# Patient Record
Sex: MALE | Race: WHITE | Employment: OTHER | RURAL
[De-identification: names, ages, dates, MRNs, and addresses within clinical notes are randomized per-mention and may not be internally consistent; named-entity substitution may affect disease eponyms.]

---

## 2018-01-31 PROBLEM — I48.0 PAROXYSMAL ATRIAL FIBRILLATION (HCC): Chronic | Status: ACTIVE | Noted: 2018-01-31

## 2018-01-31 PROBLEM — E03.9 ACQUIRED HYPOTHYROIDISM: Chronic | Status: ACTIVE | Noted: 2018-01-31

## 2018-01-31 PROBLEM — I50.22 SYSTOLIC CHF, CHRONIC (HCC): Chronic | Status: ACTIVE | Noted: 2018-01-31

## 2018-01-31 PROBLEM — I10 ESSENTIAL HYPERTENSION: Chronic | Status: ACTIVE | Noted: 2018-01-31

## 2018-01-31 PROBLEM — Z79.01 CHRONIC ANTICOAGULATION: Chronic | Status: ACTIVE | Noted: 2018-01-31

## 2018-01-31 PROBLEM — R47.01 BROCA'S APHASIA: Status: ACTIVE | Noted: 2018-01-31

## 2018-02-01 PROBLEM — G45.9 TIA (TRANSIENT ISCHEMIC ATTACK): Status: ACTIVE | Noted: 2018-02-01

## 2018-03-05 ENCOUNTER — OFFICE VISIT (OUTPATIENT)
Dept: NEUROLOGY | Age: 78
End: 2018-03-05

## 2018-03-05 ENCOUNTER — TELEPHONE (OUTPATIENT)
Dept: NEUROLOGY | Age: 78
End: 2018-03-05

## 2018-03-05 VITALS
HEIGHT: 76 IN | BODY MASS INDEX: 26.3 KG/M2 | DIASTOLIC BLOOD PRESSURE: 78 MMHG | SYSTOLIC BLOOD PRESSURE: 154 MMHG | WEIGHT: 216 LBS | OXYGEN SATURATION: 100 %

## 2018-03-05 DIAGNOSIS — I10 ESSENTIAL HYPERTENSION: ICD-10-CM

## 2018-03-05 DIAGNOSIS — I48.21 PERMANENT ATRIAL FIBRILLATION (HCC): ICD-10-CM

## 2018-03-05 DIAGNOSIS — G45.9 TRANSIENT CEREBRAL ISCHEMIA, UNSPECIFIED TYPE: Primary | ICD-10-CM

## 2018-03-05 NOTE — LETTER
3/5/2018 10:09 AM 
 
Patient:  Suresh Lyons YOB: 1940 Date of Visit: 3/5/2018 Dear MD Linda Sarabia. [facility] 
[place] [id number] VIA Facsimile: [phone] 
 : Thank you for referring Mr. Lani Ferrell to me for evaluation/treatment. Below are the relevant portions of my assessment and plan of care. HISTORY OF PRESENT ILLNESS Suresh Lyons is a 78 y.o. male. HPI Comments: Ángel Peñaloza is a 61-year-old right-handed  male who comes today for TIA. He showed up at the emergency room at [facility] within the last difficulty with speech. History of chronic atrial fibrillation and has gone in and out of atrial fibrillation in the past.  Currently is not in atrial fibrillation. He was on Xarelto and they added 81 mg of aspirin a day to Xarelto after this recent event. He had a TIA in 2005 as well which initiated the workup which found his accessible atrial fibrillation. Has a history of thyroid disease and takes replacement. He had migraine as a young man. He has had hypertension since 2005. TIA The history is provided by the patient. This is a new problem. Review of Systems Constitutional:  
     Review of systems is positive for tinnitus infrequently, complete review of systems done all others negative. Current Outpatient Prescriptions on File Prior to Visit Medication Sig Dispense Refill  aspirin 81 mg chewable tablet Take 1 Tab by mouth daily.  atorvastatin (LIPITOR) 10 mg tablet Take 1 Tab by mouth nightly. Indications: prevention of transient ischemic attack 30 Tab 0  
 levothyroxine (SYNTHROID) 75 mcg tablet Take 1 Tab by mouth Daily (before breakfast).  rivaroxaban (XARELTO) 20 mg tab tablet Take 1 Tab by mouth daily (with dinner). Indications: PREVENT THROMBOEMBOLISM IN CHRONIC ATRIAL FIBRILLATION    
 losartan (COZAAR) 100 mg tablet Take 100 mg by mouth daily.  metoprolol succinate (TOPROL-XL) 25 mg XL tablet Take 25 mg by mouth daily.  tamsulosin (FLOMAX) 0.4 mg capsule Take 0.4 mg by mouth daily.  timolol (TIMOPTIC) 0.5 % ophthalmic solution Administer 1 Drop to both eyes two (2) times a day. No current facility-administered medications on file prior to visit. Past Medical History:  
Diagnosis Date  [facility]  CAD (coronary artery disease)  Headache  Hypertension  Hypothyroidism  Stroke ([facility] History reviewed. No pertinent family history. Social History Substance Use Topics  Smoking status: Never Smoker  Smokeless tobacco: Never Used  Alcohol use No  
 
/78  Ht 6' 4\" (1.93 m)  Wt 216 lb (98 kg)  SpO2 100%  BMI 26.29 kg/m2 Physical Exam 
Constitutional: Oriented to person, place, and time, appears well-developed and well-nourished. No distress. HENT:  
Head: Normocephalic and atraumatic. Mouth/Throat: Oropharynx is clear and moist. No oropharyngeal exudate. Eyes: Conjunctivae and EOM are normal. Pupils are equal, round, and reactive to light. No scleral icterus. Neck: Normal range of motion. Neck supple. No thyromegaly present. Cardiovascular: Normal rate, regular rhythm and normal heart sounds. Musculoskeletal: Normal range of motion, exhibits no edema, tenderness or deformity. Lymphadenopathy: no cervical adenopathy. Neurological: Alert and oriented to person, place, and time. Normal strength and normal reflexes. Displays no atrophy and no tremor. No cranial nerve deficit or sensory deficit. Exhibits normal muscle tone. Displays a negative Romberg sign, no seizure activity. Coordination normal, gait normal.  
No Babinski's sign on the right side. No Babinski's sign on the left side.   
Speech, language and mentation are normal 
Visual fields are full to confrontation, funduscopic exam reveals flat discs, the retina and vasculature are normal  
 Skin: Skin is warm and dry. No rash noted, not diaphoretic. No erythema. Psychiatric: Normal mood and affect,  behavior is normal. Judgment and thought content normal.  
Vitals reviewed. ASSESSMENT and PLAN 
TIA Patient has had a TIA on Xarelto. He was put on aspirin 81 mg a day in addition to the Xarelto within the last 60 days. He has a history of accessible atrial fibrillation. He has had a transthoracic echocardiogram.  I recommend that he see Dr. Cal Golden or Dr. Radha Short, I am not sure how they work that in their group, for consideration of a transesophageal echocardiogram to make sure he does not have a PFO with shunt. Rich Willett He is 78years old however he looks 74. I will go with what cardiology recommends in this case. HYPERTENSION Stroke risk, stable, managed by primary care This note will not be viewable in [address] This note was created using voice recognition software. Despite editing, there may be syntax errors. If you have questions, please do not hesitate to call me. I look forward to following Mr. Vaughn Linares along with you. Sincerely, Radha Rubin MD

## 2018-03-05 NOTE — PROGRESS NOTES
HISTORY OF PRESENT ILLNESS  Toño Zee is a 78 y.o. male. HPI Comments: Beba Jefferson is a 51-year-old right-handed  male who comes today for TIA. He showed up at the emergency room at [address] [place] [address] within the last difficulty with speech. History of chronic atrial fibrillation and has gone in and out of atrial fibrillation in the past.  Currently is not in atrial fibrillation. He was on Xarelto and they added 81 mg of aspirin a day to Xarelto after this recent event. He had a TIA in 2005 as well which initiated the workup which found his accessible atrial fibrillation. Has a history of thyroid disease and takes replacement. He had migraine as a young man. He has had hypertension since 2005. TIA   The history is provided by the patient. This is a new problem. Review of Systems   Constitutional:        Review of systems is positive for tinnitus infrequently, complete review of systems done all others negative. Current Outpatient Prescriptions on File Prior to Visit   Medication Sig Dispense Refill    aspirin 81 mg chewable tablet Take 1 Tab by mouth daily.  atorvastatin (LIPITOR) 10 mg tablet Take 1 Tab by mouth nightly. Indications: prevention of transient ischemic attack 30 Tab 0    levothyroxine (SYNTHROID) 75 mcg tablet Take 1 Tab by mouth Daily (before breakfast).  rivaroxaban (XARELTO) 20 mg tab tablet Take 1 Tab by mouth daily (with dinner). Indications: PREVENT THROMBOEMBOLISM IN CHRONIC ATRIAL FIBRILLATION      losartan (COZAAR) 100 mg tablet Take 100 mg by mouth daily.  metoprolol succinate (TOPROL-XL) 25 mg XL tablet Take 25 mg by mouth daily.  tamsulosin (FLOMAX) 0.4 mg capsule Take 0.4 mg by mouth daily.  timolol (TIMOPTIC) 0.5 % ophthalmic solution Administer 1 Drop to both eyes two (2) times a day. No current facility-administered medications on file prior to visit.       Past Medical History:   Diagnosis Date    Atrial fibrillation [facility])     CAD (coronary artery disease)     Headache     Hypertension     Hypothyroidism     Stroke [facility])      History reviewed. No pertinent family history. Social History   Substance Use Topics    Smoking status: Never Smoker    Smokeless tobacco: Never Used    Alcohol use No     /78  Ht 6' 4\" (1.93 m)  Wt 216 lb (98 kg)  SpO2 100%  BMI 26.29 kg/m2    Physical Exam  Constitutional: Oriented to person, place, and time, appears well-developed and well-nourished. No distress. HENT:   Head: Normocephalic and atraumatic. Mouth/Throat: Oropharynx is clear and moist. No oropharyngeal exudate. Eyes: Conjunctivae and EOM are normal. Pupils are equal, round, and reactive to light. No scleral icterus. Neck: Normal range of motion. Neck supple. No thyromegaly present. Cardiovascular: Normal rate, regular rhythm and normal heart sounds. Musculoskeletal: Normal range of motion, exhibits no edema, tenderness or deformity. Lymphadenopathy: no cervical adenopathy. Neurological: Alert and oriented to person, place, and time. Normal strength and normal reflexes. Displays no atrophy and no tremor. No cranial nerve deficit or sensory deficit. Exhibits normal muscle tone. Displays a negative Romberg sign, no seizure activity. Coordination normal, gait normal.   No Babinski's sign on the right side. No Babinski's sign on the left side. Speech, language and mentation are normal  Visual fields are full to confrontation, funduscopic exam reveals flat discs, the retina and vasculature are normal   Skin: Skin is warm and dry. No rash noted, not diaphoretic. No erythema. Psychiatric: Normal mood and affect,  behavior is normal. Judgment and thought content normal.   Vitals reviewed. ASSESSMENT and PLAN  TIA  Patient has had a TIA on Xarelto. He was put on aspirin 81 mg a day in addition to the Xarelto within the last 60 days. He has a history of accessible atrial fibrillation.   He has had a transthoracic echocardiogram.  I recommend that he see Dr. Екатерина Mcdermott or Dr. Hollins Half, I am not sure how they work that in their group, for consideration of a transesophageal echocardiogram to make sure he does not have a PFO with shunt. Devi Coker He is 78years old however he looks 74. I will go with what cardiology recommends in this case. HYPERTENSION  Stroke risk, stable, managed by primary care  This note will not be viewable in [employer]. This note was created using voice recognition software. Despite editing, there may be syntax errors.

## 2018-03-05 NOTE — MR AVS SNAPSHOT
[address] [place] [id number] 
[phone] Patient: Magdy Drake MRN: [id number] Visit Information Date & Time Provider Department Dept. Phone Encounter #  
 3/5/2018  9:30 AM Merceda Oppenheim, MD [facility] Neurology [facility] [phone] [id number] Follow-up Instructions Return if symptoms worsen or fail to improve. Your Appointments 3/20/2018  2:15 PM  
ESTABLISHED PATIENT with Lisbeth Santiago MD  
[facility] Appt Note: Per Dr. Stephanie Palomo [facility] follow up had a stroke [address]  
[phone] [address]. Upcoming Health Maintenance Date Due DTaP/Tdap/Td series (1 - Tdap) 10/5/1963 ZOSTER VACCINE AGE 60> 8/5/2002 GLAUCOMA SCREENING Q2Y 10/5/2007 Pneumococcal 65+ Low/Medium Risk (1 of 2 - PCV13) 10/5/2007 MEDICARE YEARLY EXAM 10/5/2007 Influenza Age 5 to Adult 8/1/2017 Allergies as of 3/5/2018  Review Complete On: 3/5/2018 By: Merceda Oppenheim, MD  
  
 Severity Noted Reaction Type Reactions Chocolate Flavor  03/05/2018    Other (comments) Current Immunizations  Never Reviewed No immunizations on file. Not reviewed this visit You Were Diagnosed With   
  
 Codes Comments Transient cerebral ischemia, unspecified type    -  Primary ICD-10-CM: G45.9 ICD-9-CM: 435.9 Essential hypertension     ICD-10-CM: I10 
ICD-9-CM: 401.9 Permanent atrial fibrillation (HCC)     ICD-10-CM: Q79.3 ICD-9-CM: 427.31 Vitals BP Height(growth percentile) Weight(growth percentile) SpO2 BMI Smoking Status 154/78 6' 4\" (1.93 m) 216 lb (98 kg) 100% 26.29 kg/m2 Never Smoker Vitals History BMI and BSA Data Body Mass Index Body Surface Area  
 26.29 kg/m 2 2.29 m 2 Your Updated Medication List  
  
   
 This list is accurate as of 3/5/18 10:01 AM.  Always use your most recent med list.  
  
  
  
  
 aspirin 81 mg chewable tablet Take 1 Tab by mouth daily. atorvastatin 10 mg tablet Commonly known as:  LIPITOR Take 1 Tab by mouth nightly. Indications: prevention of transient ischemic attack FLOMAX 0.4 mg capsule Generic drug:  tamsulosin Take 0.4 mg by mouth daily. levothyroxine 75 mcg tablet Commonly known as:  SYNTHROID Take 1 Tab by mouth Daily (before breakfast). losartan 100 mg tablet Commonly known as:  COZAAR Take 100 mg by mouth daily. metoprolol succinate 25 mg XL tablet Commonly known as:  TOPROL-XL Take 25 mg by mouth daily. rivaroxaban 20 mg Tab tablet Commonly known as:  Amarilys Kanner Take 1 Tab by mouth daily (with dinner). Indications: PREVENT THROMBOEMBOLISM IN CHRONIC ATRIAL FIBRILLATION  
  
 timolol 0.5 % ophthalmic solution Commonly known as:  TIMOPTIC Administer 1 Drop to both eyes two (2) times a day. We Performed the Following REFERRAL TO CARDIOLOGY [BWD49 Custom] Comments: TIA on Xarelto Follow-up Instructions Return if symptoms worsen or fail to improve. Referral Information Referral ID Referred By Referred To  
  
 [id number] Ella Miller MD   
   [address] [address] Phone: [phone] Fax: [phone] Visits Status Start Date End Date 1 New Request 3/5/18 3/5/19 If your referral has a status of pending review or denied, additional information will be sent to support the outcome of this decision. Introducing [facility]! Eloina Brown introduces [employer] patient portal. Now you can access parts of your medical record, email your doctor's office, and request medication refills online. 1. In your internet browser, go to [email or web address] 2. Click on the First Time User? Click Here link in the Sign In box. You will see the New Member Sign Up page. 3. Enter your iM[employer] Access Code exactly as it appears below. You will not need to use this code after youve completed the sign-up process. If you do not sign up before the expiration date, you must request a new code. · iM[employer] Access Code: [email or web address] Expires: 5/1/2018  4:18 PM 
 
4. Enter the last four digits of your Social Security Number (xxxx) and Date of Birth (mm/dd/yyyy) as indicated and click Submit. You will be taken to the next sign-up page. 5. Create a iMOSPHERE ID. This will be your iMOSPHERE login ID and cannot be changed, so think of one that is secure and easy to remember. 6. Create a iM[employer] password. You can change your password at any time. 7. Enter your Password Reset Question and Answer. This can be used at a later time if you forget your password. 8. Enter your e-mail address. You will receive e-mail notification when new information is available in [address] 9. Click Sign Up. You can now view and download portions of your medical record. 10. Click the Download Summary menu link to download a portable copy of your medical information. If you have questions, please visit the Frequently Asked Questions section of the iM[employer]ERE website. Remember, iM[employer]ERE is NOT to be used for urgent needs. For medical emergencies, dial 911. Now available from your iPhone and Android! Please provide this summary of care documentation to your next provider. Your primary care clinician is listed as Phys Other. If you have any questions after today's visit, please call [phone].

## 2018-03-08 ENCOUNTER — DOCUMENTATION ONLY (OUTPATIENT)
Dept: NEUROLOGY | Age: 78
End: 2018-03-08

## 2018-03-20 ENCOUNTER — OFFICE VISIT (OUTPATIENT)
Dept: CARDIOLOGY CLINIC | Age: 78
End: 2018-03-20

## 2018-03-20 VITALS
DIASTOLIC BLOOD PRESSURE: 90 MMHG | HEIGHT: 75 IN | OXYGEN SATURATION: 99 % | SYSTOLIC BLOOD PRESSURE: 150 MMHG | BODY MASS INDEX: 27.27 KG/M2 | RESPIRATION RATE: 16 BRPM | HEART RATE: 60 BPM | WEIGHT: 219.3 LBS

## 2018-03-20 DIAGNOSIS — Z79.01 CHRONIC ANTICOAGULATION: Chronic | ICD-10-CM

## 2018-03-20 DIAGNOSIS — I48.0 PAROXYSMAL ATRIAL FIBRILLATION (HCC): Primary | Chronic | ICD-10-CM

## 2018-03-20 DIAGNOSIS — G45.9 TRANSIENT CEREBRAL ISCHEMIA, UNSPECIFIED TYPE: ICD-10-CM

## 2018-03-20 DIAGNOSIS — I10 ESSENTIAL HYPERTENSION: Chronic | ICD-10-CM

## 2018-03-20 RX ORDER — LATANOPROST 50 UG/ML
1 SOLUTION/ DROPS OPHTHALMIC
Status: ON HOLD | COMMUNITY
Start: 2018-02-22 | End: 2022-09-08

## 2018-03-20 NOTE — PROGRESS NOTES
Subjective:      Bharat Lambert is a 78 y.o. male is here for EP consult. Pt has a H/O TIAs in the past; he is on Xarelto and ASA. The patient denies chest pain/ shortness of breath, orthopnea, PND, LE edema,syncope, or fatigue. Mr Jodie Mims has long hx of TIAs post atrial fibrillation episodes begininning in 2005. He was last seen by EP in 2011. He is here after 2 recent TIAs post atrial fibrillation ON Xarelto. He admits to periodic dizziness not related to positional changes. Patient Active Problem List    Diagnosis Date Noted    TIA (transient ischemic attack) 02/01/2018    Broca's aphasia 01/31/2018    Paroxysmal atrial fibrillation ([facility] 01/31/2018    Chronic anticoagulation 55/41/1279    Systolic CHF, chronic ([address] 01/31/2018    Acquired hypothyroidism 01/31/2018    Essential hypertension 01/31/2018      Pat Mar MD  Past Medical History:   Diagnosis Date    Atrial fibrillation ([facility]     CAD (coronary artery disease)     Headache     Hypertension     Hypothyroidism     Stroke [facility])       Past Surgical History:   Procedure Laterality Date    HX APPENDECTOMY  1947    HX CATARACT REMOVAL  2009 x 2    HX THYROIDECTOMY  1950 Partial,    2003 Full     Allergies   Allergen Reactions    Chocolate Flavor Other (comments)      No family history on file. negative for cardiac disease  Social History     Social History    Marital status:      Spouse name: N/A    Number of children: N/A    Years of education: N/A     Social History Main Topics    Smoking status: Never Smoker    Smokeless tobacco: Never Used    Alcohol use No    Drug use: None    Sexual activity: Not Asked     Other Topics Concern    None     Social History Narrative     Current Outpatient Prescriptions   Medication Sig    VIT C/E/ZN/COPPR/LUTEIN/ZEAXAN (PRESERVISION AREDS 2 PO) Take  by mouth two (2) times a day.     latanoprost (XALATAN) 0.005 % ophthalmic solution Administer 1 Drop to both eyes nightly.  aspirin 81 mg chewable tablet Take 1 Tab by mouth daily.  levothyroxine (SYNTHROID) 75 mcg tablet Take 1 Tab by mouth Daily (before breakfast).  rivaroxaban (XARELTO) 20 mg tab tablet Take 1 Tab by mouth daily (with dinner). Indications: PREVENT THROMBOEMBOLISM IN CHRONIC ATRIAL FIBRILLATION    losartan (COZAAR) 100 mg tablet Take 100 mg by mouth daily.  metoprolol succinate (TOPROL-XL) 25 mg XL tablet Take 25 mg by mouth daily.  tamsulosin (FLOMAX) 0.4 mg capsule Take 0.8 mg by mouth daily.  timolol (TIMOPTIC) 0.5 % ophthalmic solution Administer 1 Drop to both eyes daily. No current facility-administered medications for this visit. Vitals:    03/20/18 1437 03/20/18 1438   BP: (!) 140/100 150/90   Pulse: 60    Resp: 16    SpO2: 99%    Weight: 219 lb 4.8 oz (99.5 kg)    Height: 6' 3\" (1.905 m)        I have reviewed the nurses notes, vitals, problem list, allergy list, medical history, family, social history and medications. Review of Symptoms:    General: Pt denies excessive weight gain or loss. Pt is able to conduct ADL's  HEENT: Denies blurred vision, headaches, epistaxis and difficulty swallowing. Respiratory: Denies shortness of breath, DOWNS, wheezing or stridor. Cardiovascular: Denies precordial pain, palpitations, edema or PND  Gastrointestinal: Denies poor appetite, indigestion, abdominal pain or blood in stool  Urinary: Denies dysuria, pyuria  Musculoskeletal: Denies pain or swelling from muscles or joints  Neurologic: Denies tremor, paresthesias, or sensory motor disturbance  Skin: Denies rash, itching or texture change. Psych: Denies depression      Physical Exam:      General: Well developed, in no acute distress. HEENT: Eyes - PERRL, no jvd  Heart:  Normal S1/S2 negative S3 or S4. Regular, no murmur, gallop or rub.   Respiratory: Clear bilaterally x 4, no wheezing or rales  Extremities:  No edema, normal cap refill, no cyanosis.   Musculoskeletal: No clubbing  Neuro: A&Ox3, speech clear, gait stable. Skin: Skin color is normal. No rashes or lesions. Non diaphoretic  Vascular: 2+ pulses symmetric in all extremities    Cardiographics    Ekg: Sinus  Rhythm   WITHIN NORMAL LIMITS  Ventricular rate 61. Results for orders placed or performed during the hospital encounter of 03/16/18   EKG, 12 LEAD, INITIAL   Result Value Ref Range    Ventricular Rate 74 BPM    Atrial Rate 74 BPM    P-R Interval 202 ms    QRS Duration 100 ms    Q-T Interval 382 ms    QTC Calculation (Bezet) 424 ms    Calculated P Axis 43 degrees    Calculated R Axis 19 degrees    Calculated T Axis 59 degrees    Diagnosis       Normal sinus rhythm  Normal ECG  When compared with ECG of 31-JAN-2018 11:53,  No significant change was found  Confirmed by Gerhard Menjivar MD, --- ([id number]) on 3/17/2018 7:02:13 AM           Lab Results   Component Value Date/Time    WBC 8.9 03/16/2018 02:35 PM    HGB 13.7 03/16/2018 02:35 PM    HCT 40.3 03/16/2018 02:35 PM    PLATELET 281 91/60/1845 02:35 PM    MCV 83.6 03/16/2018 02:35 PM      Lab Results   Component Value Date/Time    Sodium 137 03/16/2018 02:35 PM    Potassium 3.8 03/16/2018 02:35 PM    Chloride 99 03/16/2018 02:35 PM    CO2 31 03/16/2018 02:35 PM    Anion gap 7 03/16/2018 02:35 PM    Glucose 124 (H) 03/16/2018 02:35 PM    BUN 16 03/16/2018 02:35 PM    Creatinine 0.89 03/16/2018 02:35 PM    BUN/Creatinine ratio 18 03/16/2018 02:35 PM    GFR est AA >60 03/16/2018 02:35 PM    GFR est non-AA >60 03/16/2018 02:35 PM    Calcium 8.4 (L) 03/16/2018 02:35 PM    Bilirubin, total 0.7 03/16/2018 02:35 PM    AST (SGOT) 13 (L) 03/16/2018 02:35 PM    Alk.  phosphatase 92 03/16/2018 02:35 PM    Protein, total 6.7 03/16/2018 02:35 PM    Albumin 3.5 03/16/2018 02:35 PM    Globulin 3.2 03/16/2018 02:35 PM    A-G Ratio 1.1 03/16/2018 02:35 PM    ALT (SGPT) 20 03/16/2018 02:35 PM      Lab Results   Component Value Date/Time    TSH 0.55 02/01/2018 05:53 AM Assessment:            ICD-10-CM ICD-9-CM    1. Paroxysmal atrial fibrillation (HCC) I48.0 427.31    2. Essential hypertension I10 401.9 VIT C/E/ZN/COPPR/LUTEIN/ZEAXAN (PRESERVISION AREDS 2 PO)      latanoprost (XALATAN) 0.005 % ophthalmic solution      AMB POC EKG ROUTINE W/ 12 LEADS, INTER & REP   3. Transient cerebral ischemia, unspecified type G45.9 435.9    4. Chronic anticoagulation Z79.01 V58.61      Orders Placed This Encounter    AMB POC EKG ROUTINE W/ 12 LEADS, INTER & REP     Order Specific Question:   Reason for Exam:     Answer:   routine    VIT C/E/ZN/COPPR/LUTEIN/ZEAXAN (PRESERVISION AREDS 2 PO)     Sig: Take  by mouth two (2) times a day.  latanoprost (XALATAN) 0.005 % ophthalmic solution     Sig: Administer 1 Drop to both eyes nightly. Plan:     Mr Simba Bush is a pleasant 78year old male with AF and TIAs. Pt is a candidate for a MAXINE, afib ablation, and ILR. I discussed the risks/benefits/alternatives of the procedure with the patient. Risks include (but are not limited to) bleeding, heart block, infection, cva/mi/tamponade/esophageal perforation/pv stenosis/death. The patient understands that there is a 7-5% major complication rate and agrees to proceed. Thank you for this interesting consultation. Continue medical management for TIAs, AF and htn. Thank you for allowing me to participate in Radha Aletha 's care. Johnathan Dandy, NP    Thank you for allowing me to participate in this patients care.     Libby Lozoya MD, Chana Meléndez

## 2018-03-20 NOTE — PROGRESS NOTES
1. Have you been to the ER, urgent care clinic since your last visit? Hospitalized since your last visit? Yes When: 3/16/18 TIA    2. Have you seen or consulted any other health care providers outside of the [address] [place] [address] since your last visit? Include any pap smears or colon screening. Yes When: Neurologist 3/2018     Patient C/O BP fluctuations has been requested to see Dr Liz Duran for scheduling a MAXINE.

## 2018-03-20 NOTE — MR AVS SNAPSHOT
[address] [facility] 
[phone] Patient: Betzaida Sanders MRN: [id number] DVX:[id number]/[id number] Visit Information Date & Time Provider Department Dept. Phone Encounter #  
 3/20/2018  2:15 PM Eliseo Tuttle, [facility] [phone] [id number] Follow-up Instructions Return in about 4 weeks (around 4/17/2018). Follow-up and Disposition History Your Appointments 4/24/2018  2:15 PM  
PROCEDURE with PACEMAKER, [facility] [address] [facility]) Appt Note: hosp f/up w/ Dr. Siomara Payan, pm check, Dr. Selma Daugherty refd to Dr. Marifer Gramajo, pt over 2 hours away needed this date/time w/ Dr. Pam Bardales [address] [facility]  
[phone] [address] [facility]  
  
    
 4/24/2018  2:30 PM  
ESTABLISHED PATIENT with Eliseo Tuttle MD  
[facility] [address] [place] [address]) Appt Note: hosp f/up w/ Dr. Siomara Payan, pm check, Dr. Selma Daugherty refd to Dr. Marifer Gramajo, pt over 2 hours away needed this date/time w/ Dr. Pam Bardales [address] [facility]  
[phone] [address] [facility]  
  
    
 4/24/2018  2:45 PM  
New Patient with Ellie Query, [address] [facility] [address] [place] [address]) Appt Note: hosp f/up w/ Dr. Siomara Payan, pm check, Dr. Selma Daugherty refd to Dr. Marifer Gramajo, pt over 2 hours away needed this date/time w/ Dr. Pam Bardales [address] [facility]  
[phone] [address] [facility] Upcoming Health Maintenance Date Due DTaP/Tdap/Td series (1 - Tdap) 10/5/1963 ZOSTER VACCINE AGE 60> 8/5/2002 GLAUCOMA SCREENING Q2Y 10/5/2007 Pneumococcal 65+ Low/Medium Risk (1 of 2 - PCV13) 10/5/2007 Influenza Age 5 to Adult 8/1/2017 MEDICARE YEARLY EXAM 3/14/2018 Allergies as of 3/20/2018  Review Complete On: 3/20/2018 By: Kiel Maldonado MD  
  
 Severity Noted Reaction Type Reactions Chocolate Flavor  03/05/2018    Other (comments) Current Immunizations  Never Reviewed No immunizations on file. Not reviewed this visit You Were Diagnosed With   
  
 Codes Comments Paroxysmal atrial fibrillation (HCC)    -  Primary ICD-10-CM: I48.0 ICD-9-CM: 427.31 Essential hypertension     ICD-10-CM: I10 
ICD-9-CM: 401.9 Transient cerebral ischemia, unspecified type     ICD-10-CM: G45.9 ICD-9-CM: 435.9 Chronic anticoagulation     ICD-10-CM: Z79.01 
ICD-9-CM: V58.61 Vitals BP Pulse Resp Height(growth percentile) Weight(growth percentile) SpO2  
 150/90 (BP 1 Location: Right arm, BP Patient Position: Sitting) 60 16 6' 3\" (1.905 m) 219 lb 4.8 oz (99.5 kg) 99% BMI Smoking Status 27.41 kg/m2 Never Smoker Vitals History BMI and BSA Data Body Mass Index Body Surface Area  
 27.41 kg/m 2 2.29 m 2 Your Updated Medication List  
  
   
This list is accurate as of 3/20/18  3:45 PM.  Always use your most recent med list.  
  
  
  
  
 aspirin 81 mg chewable tablet Take 1 Tab by mouth daily. FLOMAX 0.4 mg capsule Generic drug:  tamsulosin Take 0.8 mg by mouth daily. latanoprost 0.005 % ophthalmic solution Commonly known as:  Jacqualyn Alma Administer 1 Drop to both eyes nightly. levothyroxine 75 mcg tablet Commonly known as:  SYNTHROID Take 1 Tab by mouth Daily (before breakfast). losartan 100 mg tablet Commonly known as:  COZAAR Take 100 mg by mouth daily. metoprolol succinate 25 mg XL tablet Commonly known as:  TOPROL-XL Take 25 mg by mouth daily. PRESERVISION AREDS 2 PO Take  by mouth two (2) times a day. rivaroxaban 20 mg Tab tablet Commonly known as:  Francenia Asa Take 1 Tab by mouth daily (with dinner).  Indications: PREVENT THROMBOEMBOLISM IN CHRONIC ATRIAL FIBRILLATION  
  
 timolol 0.5 % ophthalmic solution Commonly known as:  TIMOPTIC Administer 1 Drop to both eyes daily. We Performed the Following AMB POC EKG ROUTINE W/ 12 LEADS, INTER & REP [23079 CPT(R)] CBC W/O DIFF [15573 CPT(R)] MAGNESIUM [id number] CPT(R)] METABOLIC PANEL, COMPREHENSIVE [98465 CPT(R)] PROTHROMBIN TIME + INR [16105 CPT(R)] Follow-up Instructions Return in about 4 weeks (around 4/17/2018). Introducing [facility] & HEALTH SERVICES! [employer] introduces Lean Startup Machine patient portal. Now you can access parts of your medical record, email your doctor's office, and request medication refills online. 1. In your internet browser, go to [email or web address] 2. Click on the First Time User? Click Here link in the Sign In box. You will see the New Member Sign Up page. 3. Enter your Lean Startup Machine Access Code exactly as it appears below. You will not need to use this code after youve completed the sign-up process. If you do not sign up before the expiration date, you must request a new code. · Lean Startup Machine Access Code: [email or web address] Expires: 5/1/2018  5:18 PM 
 
4. Enter the last four digits of your Social Security Number (xxxx) and Date of Birth (mm/dd/yyyy) as indicated and click Submit. You will be taken to the next sign-up page. 5. Create a Lean Startup Machine ID. This will be your Lean Startup Machine login ID and cannot be changed, so think of one that is secure and easy to remember. 6. Create a Lean Startup Machine password. You can change your password at any time. 7. Enter your Password Reset Question and Answer. This can be used at a later time if you forget your password. 8. Enter your e-mail address. You will receive e-mail notification when new information is available in [address] 9. Click Sign Up. You can now view and download portions of your medical record.  
10. Click the Download Summary menu link to download a portable copy of your medical information. If you have questions, please visit the Frequently Asked Questions section of the Technimark website. Remember, Technimark is NOT to be used for urgent needs. For medical emergencies, dial 911. Now available from your iPhone and Android! Please provide this summary of care documentation to your next provider. Your primary care clinician is listed as Phys Other. If you have any questions after today's visit, please call [phone].

## 2018-04-09 ENCOUNTER — HOSPITAL ENCOUNTER (INPATIENT)
Dept: NON INVASIVE DIAGNOSTICS | Age: 78
LOS: 3 days | Discharge: HOME | DRG: 274 | End: 2018-04-12
Attending: INTERNAL MEDICINE | Admitting: INTERNAL MEDICINE
Payer: MEDICARE

## 2018-04-09 ENCOUNTER — ANESTHESIA EVENT (OUTPATIENT)
Dept: NON INVASIVE DIAGNOSTICS | Age: 78
DRG: 274 | End: 2018-04-09
Payer: MEDICARE

## 2018-04-09 ENCOUNTER — ANESTHESIA (OUTPATIENT)
Dept: NON INVASIVE DIAGNOSTICS | Age: 78
DRG: 274 | End: 2018-04-09
Payer: MEDICARE

## 2018-04-09 ENCOUNTER — APPOINTMENT (OUTPATIENT)
Dept: GENERAL RADIOLOGY | Age: 78
DRG: 274 | End: 2018-04-09
Attending: INTERNAL MEDICINE
Payer: MEDICARE

## 2018-04-09 PROBLEM — I48.91 A-FIB (HCC): Status: ACTIVE | Noted: 2018-04-09

## 2018-04-09 PROBLEM — I31.4 PERICARDIAL TAMPONADE: Status: ACTIVE | Noted: 2018-04-09

## 2018-04-09 LAB
ACT BLD: 136 SECS (ref 79–138)
ACT BLD: 307 SECS (ref 79–138)
ACT BLD: 340 SECS (ref 79–138)

## 2018-04-09 PROCEDURE — 85347 COAGULATION TIME ACTIVATED: CPT

## 2018-04-09 PROCEDURE — 02K83ZZ MAP CONDUCTION MECHANISM, PERCUTANEOUS APPROACH: ICD-10-PCS | Performed by: INTERNAL MEDICINE

## 2018-04-09 PROCEDURE — 77030030261 HC CBL UMB ELEC DISP MEDT -C

## 2018-04-09 PROCEDURE — 77030021678 HC GLIDESCP STAT DISP VERT -B: Performed by: ANESTHESIOLOGY

## 2018-04-09 PROCEDURE — 77030011640 HC PAD GRND REM COVD -A

## 2018-04-09 PROCEDURE — C1764 EVENT RECORDER, CARDIAC: HCPCS

## 2018-04-09 PROCEDURE — 74011250636 HC RX REV CODE- 250/636

## 2018-04-09 PROCEDURE — C1894 INTRO/SHEATH, NON-LASER: HCPCS

## 2018-04-09 PROCEDURE — 93308 TTE F-UP OR LMTD: CPT

## 2018-04-09 PROCEDURE — P9045 ALBUMIN (HUMAN), 5%, 250 ML: HCPCS

## 2018-04-09 PROCEDURE — 65620000000 HC RM CCU GENERAL

## 2018-04-09 PROCEDURE — 93655 ICAR CATH ABLTJ DSCRT ARRHYT: CPT

## 2018-04-09 PROCEDURE — 77030034850

## 2018-04-09 PROCEDURE — 77030004964 HC CBL EP ABLAT BSC -C

## 2018-04-09 PROCEDURE — 77030030278 HC COAX UMB DISP MEDT -C

## 2018-04-09 PROCEDURE — 77030008543 HC TBNG MON PRSS MRTM -A

## 2018-04-09 PROCEDURE — 93656 COMPRE EP EVAL ABLTJ ATR FIB: CPT

## 2018-04-09 PROCEDURE — 74011636320 HC RX REV CODE- 636/320

## 2018-04-09 PROCEDURE — C1769 GUIDE WIRE: HCPCS

## 2018-04-09 PROCEDURE — C1893 INTRO/SHEATH, FIXED,NON-PEEL: HCPCS

## 2018-04-09 PROCEDURE — 0W9D30Z DRAINAGE OF PERICARDIAL CAVITY WITH DRAINAGE DEVICE, PERCUTANEOUS APPROACH: ICD-10-PCS | Performed by: INTERNAL MEDICINE

## 2018-04-09 PROCEDURE — 76060000037 HC ANESTHESIA 3 TO 3.5 HR

## 2018-04-09 PROCEDURE — 77030029065 HC DRSG HEMO QCLOT ZMED -B

## 2018-04-09 PROCEDURE — 02583ZZ DESTRUCTION OF CONDUCTION MECHANISM, PERCUTANEOUS APPROACH: ICD-10-PCS | Performed by: INTERNAL MEDICINE

## 2018-04-09 PROCEDURE — C1759 CATH, INTRA ECHOCARDIOGRAPHY: HCPCS

## 2018-04-09 PROCEDURE — 74011250636 HC RX REV CODE- 250/636: Performed by: INTERNAL MEDICINE

## 2018-04-09 PROCEDURE — 94002 VENT MGMT INPAT INIT DAY: CPT

## 2018-04-09 PROCEDURE — 71045 X-RAY EXAM CHEST 1 VIEW: CPT

## 2018-04-09 PROCEDURE — 33010 HC PERICARDIOCENTESIS INITIAL: CPT

## 2018-04-09 PROCEDURE — 77030030806 HC PTCH ENSIT NAVX STJU -G

## 2018-04-09 PROCEDURE — 0JH602Z INSERTION OF MONITORING DEVICE INTO CHEST SUBCUTANEOUS TISSUE AND FASCIA, OPEN APPROACH: ICD-10-PCS | Performed by: INTERNAL MEDICINE

## 2018-04-09 PROCEDURE — C1730 CATH, EP, 19 OR FEW ELECT: HCPCS

## 2018-04-09 PROCEDURE — C1733 CATH, EP, OTHR THAN COOL-TIP: HCPCS

## 2018-04-09 PROCEDURE — 77030019908 HC STETH ESOPH SIMS -A: Performed by: ANESTHESIOLOGY

## 2018-04-09 PROCEDURE — 33282 HC IMP PT CARD EVENT RECORD: CPT

## 2018-04-09 PROCEDURE — 77030029359 HC PRB ESOPH TEMP CATH ANTM -F

## 2018-04-09 PROCEDURE — 77030020506 HC NDL TRNSPTL NRG BAYL -F

## 2018-04-09 PROCEDURE — 77030026438 HC STYL ET INTUB CARD -A: Performed by: ANESTHESIOLOGY

## 2018-04-09 PROCEDURE — 77030029163 HC CBL EP DX ACHV DISP MEDT -C

## 2018-04-09 PROCEDURE — 74011000250 HC RX REV CODE- 250: Performed by: INTERNAL MEDICINE

## 2018-04-09 PROCEDURE — 77030008684 HC TU ET CUF COVD -B: Performed by: ANESTHESIOLOGY

## 2018-04-09 PROCEDURE — 77030013797 HC KT TRNSDUC PRSSR EDWD -A

## 2018-04-09 PROCEDURE — 74011250637 HC RX REV CODE- 250/637: Performed by: INTERNAL MEDICINE

## 2018-04-09 PROCEDURE — 93613 INTRACARDIAC EPHYS 3D MAPG: CPT

## 2018-04-09 PROCEDURE — 77030018729 HC ELECTRD DEFIB PAD CARD -B

## 2018-04-09 PROCEDURE — 77030013785 HC KT PERICARDCENT BSC -C

## 2018-04-09 RX ORDER — HEPARIN SODIUM 200 [USP'U]/100ML
500 INJECTION, SOLUTION INTRAVENOUS ONCE
Status: COMPLETED | OUTPATIENT
Start: 2018-04-09 | End: 2018-04-09

## 2018-04-09 RX ORDER — TAMSULOSIN HYDROCHLORIDE 0.4 MG/1
0.8 CAPSULE ORAL DAILY
Status: DISCONTINUED | OUTPATIENT
Start: 2018-04-10 | End: 2018-04-12 | Stop reason: HOSPADM

## 2018-04-09 RX ORDER — DOBUTAMINE HYDROCHLORIDE 200 MG/100ML
INJECTION INTRAVENOUS
Status: DISCONTINUED | OUTPATIENT
Start: 2018-04-09 | End: 2018-04-09 | Stop reason: HOSPADM

## 2018-04-09 RX ORDER — LEVOTHYROXINE SODIUM 75 UG/1
75 TABLET ORAL
Status: DISCONTINUED | OUTPATIENT
Start: 2018-04-10 | End: 2018-04-12 | Stop reason: HOSPADM

## 2018-04-09 RX ORDER — HEPARIN SODIUM 1000 [USP'U]/ML
0-30000 INJECTION, SOLUTION INTRAVENOUS; SUBCUTANEOUS
Status: DISCONTINUED | OUTPATIENT
Start: 2018-04-09 | End: 2018-04-09 | Stop reason: HOSPADM

## 2018-04-09 RX ORDER — MIDAZOLAM HYDROCHLORIDE 1 MG/ML
INJECTION, SOLUTION INTRAMUSCULAR; INTRAVENOUS
Status: COMPLETED
Start: 2018-04-09 | End: 2018-04-09

## 2018-04-09 RX ORDER — PROPOFOL 10 MG/ML
INJECTION, EMULSION INTRAVENOUS AS NEEDED
Status: DISCONTINUED | OUTPATIENT
Start: 2018-04-09 | End: 2018-04-09 | Stop reason: HOSPADM

## 2018-04-09 RX ORDER — LOSARTAN POTASSIUM 50 MG/1
100 TABLET ORAL DAILY
Status: DISCONTINUED | OUTPATIENT
Start: 2018-04-10 | End: 2018-04-09

## 2018-04-09 RX ORDER — SODIUM CHLORIDE 0.9 % (FLUSH) 0.9 %
5-10 SYRINGE (ML) INJECTION AS NEEDED
Status: DISCONTINUED | OUTPATIENT
Start: 2018-04-09 | End: 2018-04-12 | Stop reason: HOSPADM

## 2018-04-09 RX ORDER — HYDROCODONE BITARTRATE AND ACETAMINOPHEN 5; 325 MG/1; MG/1
1 TABLET ORAL
Status: DISCONTINUED | OUTPATIENT
Start: 2018-04-09 | End: 2018-04-12 | Stop reason: HOSPADM

## 2018-04-09 RX ORDER — TIMOLOL MALEATE 5 MG/ML
1 SOLUTION/ DROPS OPHTHALMIC DAILY
Status: DISCONTINUED | OUTPATIENT
Start: 2018-04-10 | End: 2018-04-12 | Stop reason: HOSPADM

## 2018-04-09 RX ORDER — FENTANYL CITRATE 50 UG/ML
INJECTION, SOLUTION INTRAMUSCULAR; INTRAVENOUS AS NEEDED
Status: DISCONTINUED | OUTPATIENT
Start: 2018-04-09 | End: 2018-04-09 | Stop reason: HOSPADM

## 2018-04-09 RX ORDER — PROPOFOL 10 MG/ML
5-50 VIAL (ML) INTRAVENOUS
Status: DISCONTINUED | OUTPATIENT
Start: 2018-04-09 | End: 2018-04-10

## 2018-04-09 RX ORDER — DEXAMETHASONE SODIUM PHOSPHATE 4 MG/ML
6 INJECTION, SOLUTION INTRA-ARTICULAR; INTRALESIONAL; INTRAMUSCULAR; INTRAVENOUS; SOFT TISSUE ONCE
Status: COMPLETED | OUTPATIENT
Start: 2018-04-09 | End: 2018-04-09

## 2018-04-09 RX ORDER — KETOROLAC TROMETHAMINE 30 MG/ML
15 INJECTION, SOLUTION INTRAMUSCULAR; INTRAVENOUS EVERY 6 HOURS
Status: DISCONTINUED | OUTPATIENT
Start: 2018-04-09 | End: 2018-04-11

## 2018-04-09 RX ORDER — FENTANYL CITRATE 50 UG/ML
INJECTION, SOLUTION INTRAMUSCULAR; INTRAVENOUS
Status: COMPLETED
Start: 2018-04-09 | End: 2018-04-09

## 2018-04-09 RX ORDER — LATANOPROST 50 UG/ML
1 SOLUTION/ DROPS OPHTHALMIC
Status: DISCONTINUED | OUTPATIENT
Start: 2018-04-09 | End: 2018-04-12 | Stop reason: HOSPADM

## 2018-04-09 RX ORDER — SODIUM CHLORIDE 9 MG/ML
1000 INJECTION, SOLUTION INTRAVENOUS CONTINUOUS
Status: DISCONTINUED | OUTPATIENT
Start: 2018-04-09 | End: 2018-04-10

## 2018-04-09 RX ORDER — PROTAMINE SULFATE 10 MG/ML
INJECTION, SOLUTION INTRAVENOUS
Status: COMPLETED
Start: 2018-04-09 | End: 2018-04-09

## 2018-04-09 RX ORDER — SODIUM CHLORIDE 0.9 % (FLUSH) 0.9 %
5-10 SYRINGE (ML) INJECTION AS NEEDED
Status: DISCONTINUED | OUTPATIENT
Start: 2018-04-09 | End: 2018-04-12 | Stop reason: SDUPTHER

## 2018-04-09 RX ORDER — SODIUM CHLORIDE 0.9 % (FLUSH) 0.9 %
5-10 SYRINGE (ML) INJECTION EVERY 8 HOURS
Status: DISCONTINUED | OUTPATIENT
Start: 2018-04-09 | End: 2018-04-12 | Stop reason: HOSPADM

## 2018-04-09 RX ORDER — FENTANYL CITRATE 50 UG/ML
25 INJECTION, SOLUTION INTRAMUSCULAR; INTRAVENOUS
Status: DISCONTINUED | OUTPATIENT
Start: 2018-04-09 | End: 2018-04-11

## 2018-04-09 RX ORDER — LIDOCAINE HYDROCHLORIDE 10 MG/ML
0.1 INJECTION, SOLUTION EPIDURAL; INFILTRATION; INTRACAUDAL; PERINEURAL AS NEEDED
Status: DISCONTINUED | OUTPATIENT
Start: 2018-04-09 | End: 2018-04-12 | Stop reason: HOSPADM

## 2018-04-09 RX ORDER — SUCCINYLCHOLINE CHLORIDE 20 MG/ML
INJECTION INTRAMUSCULAR; INTRAVENOUS AS NEEDED
Status: DISCONTINUED | OUTPATIENT
Start: 2018-04-09 | End: 2018-04-09 | Stop reason: HOSPADM

## 2018-04-09 RX ORDER — ALBUMIN HUMAN 50 G/1000ML
SOLUTION INTRAVENOUS AS NEEDED
Status: DISCONTINUED | OUTPATIENT
Start: 2018-04-09 | End: 2018-04-09 | Stop reason: HOSPADM

## 2018-04-09 RX ORDER — DOBUTAMINE HYDROCHLORIDE 200 MG/100ML
0-10 INJECTION INTRAVENOUS
Status: DISCONTINUED | OUTPATIENT
Start: 2018-04-09 | End: 2018-04-09

## 2018-04-09 RX ORDER — SODIUM CHLORIDE 9 MG/ML
INJECTION, SOLUTION INTRAVENOUS
Status: DISCONTINUED | OUTPATIENT
Start: 2018-04-09 | End: 2018-04-09 | Stop reason: HOSPADM

## 2018-04-09 RX ORDER — DOPAMINE HYDROCHLORIDE 320 MG/100ML
INJECTION, SOLUTION INTRAVENOUS
Status: DISCONTINUED | OUTPATIENT
Start: 2018-04-09 | End: 2018-04-09

## 2018-04-09 RX ORDER — NALOXONE HYDROCHLORIDE 0.4 MG/ML
0.4 INJECTION, SOLUTION INTRAMUSCULAR; INTRAVENOUS; SUBCUTANEOUS AS NEEDED
Status: DISCONTINUED | OUTPATIENT
Start: 2018-04-09 | End: 2018-04-12 | Stop reason: HOSPADM

## 2018-04-09 RX ORDER — HYDROMORPHONE HYDROCHLORIDE 1 MG/ML
0.2 INJECTION, SOLUTION INTRAMUSCULAR; INTRAVENOUS; SUBCUTANEOUS
Status: DISCONTINUED | OUTPATIENT
Start: 2018-04-09 | End: 2018-04-09

## 2018-04-09 RX ORDER — SODIUM CHLORIDE 0.9 % (FLUSH) 0.9 %
5-10 SYRINGE (ML) INJECTION EVERY 8 HOURS
Status: DISCONTINUED | OUTPATIENT
Start: 2018-04-09 | End: 2018-04-12 | Stop reason: SDUPTHER

## 2018-04-09 RX ORDER — FENTANYL CITRATE 50 UG/ML
50 INJECTION, SOLUTION INTRAMUSCULAR; INTRAVENOUS
Status: DISCONTINUED | OUTPATIENT
Start: 2018-04-09 | End: 2018-04-11

## 2018-04-09 RX ORDER — DEXAMETHASONE SODIUM PHOSPHATE 4 MG/ML
4 INJECTION, SOLUTION INTRA-ARTICULAR; INTRALESIONAL; INTRAMUSCULAR; INTRAVENOUS; SOFT TISSUE EVERY 8 HOURS
Status: DISCONTINUED | OUTPATIENT
Start: 2018-04-10 | End: 2018-04-10

## 2018-04-09 RX ORDER — HEPARIN SODIUM 1000 [USP'U]/ML
INJECTION, SOLUTION INTRAVENOUS; SUBCUTANEOUS AS NEEDED
Status: DISCONTINUED | OUTPATIENT
Start: 2018-04-09 | End: 2018-04-09 | Stop reason: HOSPADM

## 2018-04-09 RX ORDER — MUPIROCIN 20 MG/G
OINTMENT TOPICAL EVERY 12 HOURS
Status: DISCONTINUED | OUTPATIENT
Start: 2018-04-09 | End: 2018-04-12 | Stop reason: HOSPADM

## 2018-04-09 RX ORDER — CHLORHEXIDINE GLUCONATE 1.2 MG/ML
15 RINSE ORAL EVERY 12 HOURS
Status: DISCONTINUED | OUTPATIENT
Start: 2018-04-09 | End: 2018-04-10

## 2018-04-09 RX ORDER — SODIUM CHLORIDE, SODIUM LACTATE, POTASSIUM CHLORIDE, CALCIUM CHLORIDE 600; 310; 30; 20 MG/100ML; MG/100ML; MG/100ML; MG/100ML
25 INJECTION, SOLUTION INTRAVENOUS CONTINUOUS
Status: DISCONTINUED | OUTPATIENT
Start: 2018-04-09 | End: 2018-04-10

## 2018-04-09 RX ORDER — GUAIFENESIN 100 MG/5ML
81 LIQUID (ML) ORAL DAILY
Status: DISCONTINUED | OUTPATIENT
Start: 2018-04-10 | End: 2018-04-12 | Stop reason: HOSPADM

## 2018-04-09 RX ORDER — PROTAMINE SULFATE 10 MG/ML
INJECTION, SOLUTION INTRAVENOUS AS NEEDED
Status: DISCONTINUED | OUTPATIENT
Start: 2018-04-09 | End: 2018-04-09 | Stop reason: HOSPADM

## 2018-04-09 RX ORDER — MIDAZOLAM HYDROCHLORIDE 1 MG/ML
INJECTION, SOLUTION INTRAMUSCULAR; INTRAVENOUS AS NEEDED
Status: DISCONTINUED | OUTPATIENT
Start: 2018-04-09 | End: 2018-04-09 | Stop reason: HOSPADM

## 2018-04-09 RX ORDER — ACETAMINOPHEN 325 MG/1
650 TABLET ORAL
Status: DISCONTINUED | OUTPATIENT
Start: 2018-04-09 | End: 2018-04-12 | Stop reason: HOSPADM

## 2018-04-09 RX ORDER — LIDOCAINE HYDROCHLORIDE AND EPINEPHRINE 10; 10 MG/ML; UG/ML
INJECTION, SOLUTION INFILTRATION; PERINEURAL
Status: DISPENSED
Start: 2018-04-09 | End: 2018-04-10

## 2018-04-09 RX ORDER — PROTAMINE SULFATE 10 MG/ML
25-100 INJECTION, SOLUTION INTRAVENOUS
Status: DISCONTINUED | OUTPATIENT
Start: 2018-04-09 | End: 2018-04-09 | Stop reason: HOSPADM

## 2018-04-09 RX ORDER — HEPARIN SODIUM 200 [USP'U]/100ML
INJECTION, SOLUTION INTRAVENOUS
Status: COMPLETED
Start: 2018-04-09 | End: 2018-04-09

## 2018-04-09 RX ORDER — DOBUTAMINE HYDROCHLORIDE 200 MG/100ML
0-10 INJECTION INTRAVENOUS
Status: DISPENSED | OUTPATIENT
Start: 2018-04-09 | End: 2018-04-09

## 2018-04-09 RX ORDER — METOPROLOL SUCCINATE 25 MG/1
25 TABLET, EXTENDED RELEASE ORAL DAILY
Status: DISCONTINUED | OUTPATIENT
Start: 2018-04-10 | End: 2018-04-09

## 2018-04-09 RX ORDER — DIPHENHYDRAMINE HYDROCHLORIDE 50 MG/ML
12.5 INJECTION, SOLUTION INTRAMUSCULAR; INTRAVENOUS AS NEEDED
Status: ACTIVE | OUTPATIENT
Start: 2018-04-09 | End: 2018-04-09

## 2018-04-09 RX ADMIN — SODIUM CHLORIDE: 9 INJECTION, SOLUTION INTRAVENOUS at 15:07

## 2018-04-09 RX ADMIN — LATANOPROST 1 DROP: 50 SOLUTION OPHTHALMIC at 21:20

## 2018-04-09 RX ADMIN — Medication 10 ML: at 16:32

## 2018-04-09 RX ADMIN — MUPIROCIN: 20 OINTMENT TOPICAL at 20:05

## 2018-04-09 RX ADMIN — HEPARIN SODIUM 1000 UNITS: 200 INJECTION, SOLUTION INTRAVENOUS at 13:24

## 2018-04-09 RX ADMIN — ALBUMIN HUMAN 200 ML: 50 SOLUTION INTRAVENOUS at 15:14

## 2018-04-09 RX ADMIN — DOBUTAMINE HYDROCHLORIDE 20 MCG/KG/MIN: 200 INJECTION INTRAVENOUS at 14:48

## 2018-04-09 RX ADMIN — MIDAZOLAM HYDROCHLORIDE 2 MG: 1 INJECTION, SOLUTION INTRAMUSCULAR; INTRAVENOUS at 15:48

## 2018-04-09 RX ADMIN — PROPOFOL 50 MCG/KG/MIN: 10 INJECTION, EMULSION INTRAVENOUS at 18:55

## 2018-04-09 RX ADMIN — IOPAMIDOL 50 ML: 755 INJECTION, SOLUTION INTRAVENOUS at 13:24

## 2018-04-09 RX ADMIN — DEXAMETHASONE SODIUM PHOSPHATE 6 MG: 4 INJECTION, SOLUTION INTRAMUSCULAR; INTRAVENOUS at 17:00

## 2018-04-09 RX ADMIN — PROPOFOL 50 MCG/KG/MIN: 10 INJECTION, EMULSION INTRAVENOUS at 22:16

## 2018-04-09 RX ADMIN — SODIUM CHLORIDE 1000 ML: 900 INJECTION, SOLUTION INTRAVENOUS at 16:17

## 2018-04-09 RX ADMIN — PROPOFOL 20 MG: 10 INJECTION, EMULSION INTRAVENOUS at 13:40

## 2018-04-09 RX ADMIN — FENTANYL CITRATE 100 MCG: 50 INJECTION, SOLUTION INTRAMUSCULAR; INTRAVENOUS at 15:47

## 2018-04-09 RX ADMIN — CHLORHEXIDINE GLUCONATE 15 ML: 1.2 RINSE ORAL at 20:05

## 2018-04-09 RX ADMIN — FENTANYL CITRATE 50 MCG: 50 INJECTION, SOLUTION INTRAMUSCULAR; INTRAVENOUS at 13:35

## 2018-04-09 RX ADMIN — SUCCINYLCHOLINE CHLORIDE 60 MG: 20 INJECTION INTRAMUSCULAR; INTRAVENOUS at 13:40

## 2018-04-09 RX ADMIN — ALBUMIN HUMAN 250 ML: 50 SOLUTION INTRAVENOUS at 14:35

## 2018-04-09 RX ADMIN — HEPARIN SODIUM 15000 UNITS: 1000 INJECTION, SOLUTION INTRAVENOUS; SUBCUTANEOUS at 13:35

## 2018-04-09 RX ADMIN — PROTAMINE SULFATE 100 MG: 10 INJECTION, SOLUTION INTRAVENOUS at 14:56

## 2018-04-09 RX ADMIN — HEPARIN SODIUM 5000 UNITS: 1000 INJECTION, SOLUTION INTRAVENOUS; SUBCUTANEOUS at 14:12

## 2018-04-09 RX ADMIN — KETOROLAC TROMETHAMINE 15 MG: 30 INJECTION, SOLUTION INTRAMUSCULAR at 17:52

## 2018-04-09 RX ADMIN — SODIUM CHLORIDE: 9 INJECTION, SOLUTION INTRAVENOUS at 12:58

## 2018-04-09 RX ADMIN — FENTANYL CITRATE 50 MCG: 50 INJECTION, SOLUTION INTRAMUSCULAR; INTRAVENOUS at 13:07

## 2018-04-09 RX ADMIN — PROPOFOL 180 MG: 10 INJECTION, EMULSION INTRAVENOUS at 13:07

## 2018-04-09 RX ADMIN — SUCCINYLCHOLINE CHLORIDE 140 MG: 20 INJECTION INTRAMUSCULAR; INTRAVENOUS at 13:07

## 2018-04-09 RX ADMIN — HEPARIN SODIUM 1000 UNITS: 200 INJECTION, SOLUTION INTRAVENOUS at 13:25

## 2018-04-09 NOTE — IP AVS SNAPSHOT
[address] [facility] 
[phone] Patient: Endy Merritt MRN: [id number] About your hospitalization You were admitted on:  April 9, 2018 You last received care in the:  hospitals 2 CARDIOPULMONARY CARE You were discharged on:  April 12, 2018 Why you were hospitalized Your primary diagnosis was:  Not on File Your diagnoses also included:  A-Fib (Hcc), Pericardial Tamponade, Essential Hypertension, Tia (Transient Ischemic Attack) Follow-up Information Follow up With Details Comments Contact Info Tao Tellez MD Go on 4/26/2018 At time you have written down at home. [address] [place] [address] Road [phone] Binta Lundy MD Go on 4/24/2018 2:15PM; Cardiology follow-up [address] [place] [address] 
[phone] Your Scheduled Appointments Tuesday April 24, 2018  2:15 PM EDT PROCEDURE with PACEMAKER, [facility]) [address] [place] [address]  
[phone] Tuesday April 24, 2018  2:30 PM EDT  
ESTABLISHED PATIENT with Binta Lundy MD  
[facility] [address] [place] [address]) [address] [place] [address]  
[phone] Tuesday April 24, 2018  2:45 PM EDT New Patient with Jatinder Beck MD  
[facility] [address] [place] [address]) [address] [place] [address]  
[phone] Discharge Orders None A check maddy indicates which time of day the medication should be taken. My Medications CONTINUE taking these medications Instructions Each Dose to Equal  
 Morning Noon Evening Bedtime  
 aspirin 81 mg chewable tablet Take 1 Tab by mouth daily. 81 mg  
    
   
   
   
  
 FLOMAX 0.4 mg capsule Generic drug:  tamsulosin Your next dose is:  Tomorrow Take 0.8 mg by mouth daily. 0.8 mg  
    
  
   
   
   
  
 latanoprost 0.005 % ophthalmic solution Commonly known as:  Roberto Collar Your next dose is: Today Administer 1 Drop to both eyes nightly. 1 Drop  
    
   
   
   
  
  
 levothyroxine 75 mcg tablet Commonly known as:  SYNTHROID Your next dose is:  Tomorrow Take 1 Tab by mouth Daily (before breakfast). 75 mcg  
    
  
   
   
   
  
 losartan 100 mg tablet Commonly known as:  COZAAR Your next dose is: Today Take 100 mg by mouth daily. 100 mg  
    
   
   
  
   
  
 metoprolol succinate 25 mg XL tablet Commonly known as:  TOPROL-XL Your next dose is:  Tomorrow Take 25 mg by mouth daily. 25 mg PRESERVISION AREDS 2 PO Your next dose is: Today Take  by mouth two (2) times a day. rivaroxaban 20 mg Tab tablet Commonly known as:  Marcie Lopez Your next dose is: Today Take 1 Tab by mouth daily (with dinner). Indications: PREVENT THROMBOEMBOLISM IN CHRONIC ATRIAL FIBRILLATION  
 20 mg  
    
   
   
  
   
  
 timolol 0.5 % ophthalmic solution Commonly known as:  TIMOPTIC Your next dose is:  Tomorrow Administer 1 Drop to both eyes daily. 1 Drop Discharge Instructions [address] [phone] ATRIAL FIBRILLATION ABLATION DISCHARGE INSTRUCTIONS Patient ID: 
Cortez Abreu 
[id number] 
78 y.o. 
1940 Admit Date: 4/9/2018 Discharge Date: 4/12/2018 Admitting Physician: Yamile Wynn MD  
 
Discharge Physician: Yamile Wynn MD 
 
Admission Diagnoses:  
a fib PVI A-fib ([address] Pericardial tamponade Discharge Diagnoses: Active Problems: 
  Essential hypertension (1/31/2018) TIA (transient ischemic attack) (2/1/2018) A-fib ([address] (4/9/2018) Pericardial tamponade (4/9/2018) Discharge Condition: Good Cardiology Procedures this Admission:  Atrial fibrillation. Disposition: home Reference discharge instructions provided by nursing for diet and activity. Follow-up with Dr Errol Boss in one week. Call [phone] to make an appointment. Signed: 
KACIE Marrero 
4/12/2018 
9:43 AM 
 
S/P ATRIAL FIBRILLATION ABLATION DISCHARGE INSTRUCTIONS It is normal to feel tired the first couple days. Take it easy and follow the physicians instructions. CHECK THE CATHETER INSERTION SITE DAILY: 
You may shower 24 hours after the procedure, remove the bandage during showering. Wash with soap and water and pat dry. Gentle cleaning of the site with soap and water is sufficient, cover with a dry clean dressing or bandage. Do not apply creams or powders to the area. Do not sit in a bathtub or pool of water for 7 days or until wound has completely healed. Temporary bruising and discomfort is normal and may last a few weeks. You may have a  formation of a small lump at the site which may last up to 6 weeks. CALL THE PHYSICIAN: If the site becomes red, swollen or feels warm to the touch If there is bleeding or drainage or if there is unusual pain at the groin or down the leg. If there is any bleeding, lie down, apply pressure or have someone apply pressure with a clean cloth until the bleeding stops. If the bleeding continues, call 911 to be transported to the hospital. 
DO  [facility] 372. Activity: For the first 24-48 hours or as instructed by the physician: No lifting, pushing or pulling over 5 pounds and no straining the insertion site. Do not life grocery bags or the garbage can, do not run the vacuum  or  for 7 days. Start with short walks as in the hospital and gradually increase as tolerated each day. It is recommended to walk 30 minutes 5-7 days per week. Follow your physicians instructions on activity. Avoid walking outside in extremes of heat or cold. Walk inside when it is cold and windy or hot and humid. Things to keep in mind: 
No driving for at least 5 days or as designated by your physician. Limit the number of times you go up and down the stairs Take rests and pace yourself with activity. Be careful and do not strain with bowel movements. Medications: Take all medications as prescribed Call your physician if you have any questions Keep an updated list of your medications with you at all times and give a list to your physician and pharmacist 
 
Signs and Symptoms: 
Be cautious of symptoms of angina or recurrent symptoms such as chest discomfort, unusual shortness of breath or fatigue, palpitations. After Care: Follow up with your physician as instructed. Follow a heart healthy diet with proper portion control, daily stress management, daily exercise, blood pressure and cholesterol control , and smoking cessation. LOOP/LINQ RECORDER DISCHARGE INSTRUCTIONS 1. Carry you ID card for your Loop Recorder with you at all times. This card will be given to you in the hospital or mailed to you. 2. Call for an appointment in 2 weeks [phone]. 3. The Loop Recorder will bulge slightly under your skin. Please notify the doctor's office if you notice any of the following around your Loop recorder site: A.  A bruise that does not go away B. Soreness or yellow, green, or brown drainage from the site. C. Any swelling from the site. D. If you have a fever of 100 degrees or higher that lasts for a few days 4. You will receive instructions on the use of your loop recorder. INCISION CARE 1.  Leave dressing over your site for at least 2 days 2.   Leave steri-strips over your site until they start to fall off.  
3.   You may shower after as long as your incision isnt submerged or directly sprayed upon until well healed. 4.  For comfort, wear loose fitting clothing. 5.  Ice pack to affected shoulder for first 24 hours. 6.  Report any signs of infection, fever, pain, swelling, redness, oozing, or heat at site especially if these symptoms increase after the first 3 to 4 days. ACTIVITY PRECAUTIONS 1. Avoid rough contact with the implant site. 2. Any extreme activity such as golf, weight lifting or exercise biking should be restricted for 30 days. 3. Do not carry objects by holding them against your implant site. Don't carry a telephone or other transmitting device in a pocket in front of your heart monitor or in a shoulder bag near your heart monitor. SPECIAL PRECAUTIONS 1. You may have an MRI. 2. Advise dentist or other medical personnel you see that you have a  Loop recorder. 3.  Cell phones and microwave oven use is okay. 4.  If you plan to move or take a trip to a new area, the doctor's office will give you a name of a doctor to contact for any problems. Weight Wins Announcement We are excited to announce that we are making your provider's discharge notes available to you in Weight Wins. You will see these notes when they are completed and signed by the physician that discharged you from your recent hospital stay. If you have any questions or concerns about any information you see in Weight Wins, please call the Health Information Department where you were seen or reach out to your Primary Care Provider for more information about your plan of care. Introducing [facility] & HEALTH SERVICES! [facility] introduces Weight Wins patient portal. Now you can access parts of your medical record, email your doctor's office, and request medication refills online. 1. In your internet browser, go to [email or web address] 2. Click on the First Time User? Click Here link in the Sign In box. You will see the New Member Sign Up page. 3. Enter your Overlay Studio Access Code exactly as it appears below. You will not need to use this code after youve completed the sign-up process. If you do not sign up before the expiration date, you must request a new code. · Overlay Studio Access Code: [email or web address] Expires: 5/1/2018  5:18 PM 
 
4. Enter the last four digits of your Social Security Number (xxxx) and Date of Birth (mm/dd/yyyy) as indicated and click Submit. You will be taken to the next sign-up page. 5. Create a [email or web address] ID. This will be your Overlay Studio login ID and cannot be changed, so think of one that is secure and easy to remember. 6. Create a Overlay Studio password. You can change your password at any time. 7. Enter your Password Reset Question and Answer. This can be used at a later time if you forget your password. 8. Enter your e-mail address. You will receive e-mail notification when new information is available in [address] 9. Click Sign Up. You can now view and download portions of your medical record. 10. Click the Download Summary menu link to download a portable copy of your medical information. If you have questions, please visit the Frequently Asked Questions section of the Overlay Studio website. Remember, Overlay Studio is NOT to be used for urgent needs. For medical emergencies, dial 911. Now available from your iPhone and Android! Introducing [employer] As a [facility] patient, I wanted to make you aware of our electronic visit tool called Tang Chivorickgabbie. [facility] [facility] 24/7 allows you to connect within minutes with a medical provider 24 hours a day, seven days a week via a mobile device or tablet or logging into a secure website from your computer. You can access Tang Clement from anywhere in the [place].  
 
A virtual visit might be right for you when you have a simple condition and feel like you just dont want to get out of bed, or cant get away from work for an appointment, when your regular [facility] provider is not available (evenings, weekends or holidays), or when youre out of town and need minor care. Electronic visits cost only $49 and if the [facility] 24/7 provider determines a prescription is needed to treat your condition, one can be electronically transmitted to a nearby pharmacy*. Please take a moment to enroll today if you have not already done so. The enrollment process is free and takes just a few minutes. To enroll, please download the RescueTime 24/MyHealthTeams john to your tablet or phone, or visit [email or web address] to enroll on your computer. And, as an [address] [place] [address] patient with a [employer] account, the results of your visits will be scanned into your electronic medical record and your primary care provider will be able to view the scanned results. We urge you to continue to see your regular [facility] provider for your ongoing medical care. And while your primary care provider may not be the one available when you seek a [employer]rickfin virtual visit, the peace of mind you get from getting a real diagnosis real time can be priceless. For more information on [employer], view our Frequently Asked Questions (FAQs) at [email or web address]. Sincerely, 
 
Saul Wang MD 
Chief Medical Officer [address] [place] [address] *:  certain medications cannot be prescribed via [employer] Unresulted Labs-Please follow up with your PCP about these lab tests Order Current Status EKG, 12 LEAD, INITIAL Preliminary result Providers Seen During Your Hospitalization Provider Specialty Primary office phone Yamile Wynn MD Cardiology [phone] Your Primary Care Physician (PCP) Primary Care Physician Office Phone Office Fax Raguel Scheuermann [phone] You are allergic to the following Allergen Reactions Chocolate Flavor Other (comments) Recent Documentation Height Weight BMI Smoking Status 1.905 m 97.9 kg 26.98 kg/m2 Never Smoker Emergency Contacts Name Discharge Info Relation Home Work Mobile Mandi Celeste \"Inocencia\" DISCHARGE CAREGIVER [3] Spouse [3] [phone] Patient Belongings The following personal items are in your possession at time of discharge: 
  Dental Appliances: None  Visual Aid: Glasses      Home Medications: None   Jewelry: None  Clothing: At bedside, [place], Footwear, Jacket/Coat, Pants, Shirt, Undergarments    Other Valuables: None Please provide this summary of care documentation to your next provider. Signatures-by signing, you are acknowledging that this After Visit Summary has been reviewed with you and you have received a copy. Patient Signature:  ____________________________________________________________ Date:  ____________________________________________________________  
  
[facility] Provider Signature:  ____________________________________________________________ Date:  ____________________________________________________________

## 2018-04-09 NOTE — PROCEDURES
Brittany Chavis [place], [address] [place] [address]  [phone]    Indications and Pre-Procedure Diagnosis:  Jackie Caballero is a 78 y.o. male with AF is referred for implantable loop recorder. Post Procedure Diagnosis:  AF    Loop Recorder Implant Procedure and Findings:  Informed consent was obtained. The procedure was performed under local anesthesia. Continuous pulse oximetry and cuff pressure were monitored. During the procedure, the patient received Versed and Fentanyl for sedation. The mid-chest area was prepped and draped in the usual sterile fashion and was liberally infiltrated with 1% lidocaine. An incision was made and the device implanted. Manual pressure was held until hemostasis was achieved. Total procedure times was 15 minutes. Estimated blood loss <10 ml. Sharp count: correct. Specimen(s) collected: none. The following procedure related complication occurred: none. The following problems were encountered: none. Findings: successful loop recorder placement. Final Programmed Parameters  VT  180 bpm  VF  210 bpm  Jeramy  40 bpm  Asystole  ON  AT/AF  ON    Supplies Summary available in the chart  tribalXtronic    Thank you for allowing me to participate in this patients care.     Stanislav Duarte MD, Alanna Bloch

## 2018-04-09 NOTE — PROCEDURES
[address] [address]  [phone]    Indications and Pre-Procedure Diagnosis:  Moody Ortega is a 78 y.o. male with AF and AFL is referred for electr-physiologic evaluation and intervention. Post Procedure Diagnosis    AF  AFL    Atrial Fibrillation Ablation Summary  Informed consent was obtained. The patient was brought to the EP lab where MAXINE demonstrated no thrombus in the left atrial appendage. All vascular access sites were prepped and draped in the usual sterile fashion and the Seldinger technique was used to catherize the RFV and LFV with multi-polar electrode catheters, which were placed in the appropriate intra-cardiac sites under fluoroscopic guidance (see catheter list). Right and left atrial pacing and recording, His bundle recording, and right ventricular pacing and recording were performed. Continuous pulse oximetry and cuff BP monitoring were performed. During the procedure, the patient received Versed, Fentanyl and Propofol for sedation per anesthesia personnel. Transeptal Puncture  A transeptal atrial puncture was performed using fluoroscopic and intracardiac ultrasound guidance. An SL1 sheath was dragged from the SVC along the septum until a sudden leftward displacement was observed. Engagement of the Fossa Ovalis was confirmed by the interatrial tenting seen under intracardiac ultrasound guidance. The Brule Harika NRG needle was advanced into the left atrium and its positioned confirmed with contrast injection. The dilator and sheath were advanced carefully over the needle into the body of the left atrium and the dilator/needle removed. A Flexcath sheath was exchanged over the wire for the SL1 sheath. No pericardial effusion was appreciated on intracardiac ultrasound so a bolus injection of heparin 100 units/kg was administered, with additional boluses q 30 minutes as necessary.  so that the activated clotting time maintained was between 300 and 400 seconds     A 3D shell representing the left atrium and pulmonary veins was constructed with the electroanatomic mapping system. An Achieve circular mapping catheter was advanced into the left atrium through the Flexcath sheath and a map of the body of the LA and the pulmonary veins was created. Pulmonary vein venography was also performed at that time. A 28 mm Medtronic Cryo balloon was advanced into the left atrium. Cryo ablation of the left atrium was performed at the PV ostia to encircle the right and left PVs. Cryo energy was applied for a total of 16 minutes with confirmed entrance/exit block of four pulmonary veins. Atrial Flutter Ablation  The Flexcath sheath was pulled back to the right atrium and the cryoballoon was removed. A large curve 10 mm tip Blazer catheter was used to deliver 2 RF lesions for a total of 4 minutes in the cavo-tricuspid isthmus with creation of bi-directional isthmus block. Autonomic manipulation with Dobutamine @ 20 mcg/min was performed during this procedure. Post ablation, rapid atrial pacing to 240 msec, on and off dobutamine, failed to induce any atrial arrhythmias. At the end of the procedure, all catheters were removed, heparin reversed, groin sheaths pulled and hemostasis achieved. The patient left the laboratory in a stable condition. Fluoroscopy and procedure times were 17 and 85 minutes respectively. Estimated blood loss: <10 ml. Sharp counts: correct. Specimen (s) collected: none. The procedure related complication occurred: none. The following problems were encountered: none. Conduction intervals (ms)    A-A A-H H-V P-R QRS Q-T R-R V-V  [phone]    AV emmanuel conduction    VA Block when pacing at 410 ms    Findings and Summary    This study demonstrates:  1. PVI of all 4 PVs  2. Atrial flutter ablation with RFA of the CTI and confirmed bi-directional isthmus block  3.  No further inducible atrial arrhythmias on dobutamine with rapid atrial pacing    Recommendation:  1. [address]  2. ILR      Thank you for allowing me to participate in this patients care.     Lory Ralph MD, [facility]

## 2018-04-09 NOTE — ANESTHESIA PREPROCEDURE EVALUATION
Anesthetic History   No history of anesthetic complications            Review of Systems / Medical History  Patient summary reviewed, nursing notes reviewed and pertinent labs reviewed    Pulmonary  Within defined limits                 Neuro/Psych       CVA  TIA    Comments: CVA - Broca's Aphasia Cardiovascular    Hypertension      CHF  Dysrhythmias : atrial fibrillation  CAD    Exercise tolerance: >4 METS  Comments: TTE (1/31/18):   Grade I Diastolic Dysfunction, KN=39-57%    Chronic anticoagulation   GI/Hepatic/Renal  Within defined limits              Endo/Other      Hypothyroidism: well controlled       Other Findings            Physical Exam    Airway  Mallampati: III  TM Distance: < 4 cm  Neck ROM: normal range of motion   Mouth opening: Normal     Cardiovascular    Rhythm: regular  Rate: normal         Dental      Comments: Multiple fillings   Pulmonary  Breath sounds clear to auscultation               Abdominal  GI exam deferred       Other Findings            Anesthetic Plan    ASA: 3  Anesthesia type: general          Induction: Intravenous  Anesthetic plan and risks discussed with: Patient

## 2018-04-09 NOTE — PROGRESS NOTES
[facility]  Pulmonary, Critical Care, and Sleep Medicine    Name: Gregg Bui MRN: [id number]   : 1940 Hospital: [facility]   Date: 2018        Critical Care Initial Patient Consult    IMPRESSION:   · S/p Afib ablation  · Pericardial Effusion  · Acute post op respiratory support  · Hypotension noted earlier now resolved. · Risk of harming himself placed  Order for bilateral wrist restraints. · Prior CVA, TIA  · Hypertension  · Hypothyroidism  · Has been on Chronic Anticoagulation with Xarelto and ASA. · Critically ill, moderate to high risk of decompensation. On mechanical vent support. Had pericardial drain placed. 35 min CC, EOP. Discused with nursing. Discussed with Dr. Silvano Waed.   · Discussed with pts wife Deisi Ramirez at his bedside. RECOMMENDATIONS:   · Will give dose of decadron in cased of laryngeal edema or inflammation. · Vent support for now, Wean fio2 as tolerated. · Will check CXR   · Will need repeat Echo in am.  · SUP prophylaxis. · When awake will do a SAT and SBT, will check ABG prior to extubation anticipated extubation in am.   · Sedation with diprivan, target RASS of zero to minus 1.  · Monitor pericardial drain output. · Check repeat labs in am.  · Repeat CXR in am.      Subjective/History: This patient has been seen and evaluated at the request of Dr. Silvano Wade for above. Patient is a 78 y.o. male who was brought from the cath lab. Was having a fib ablation, had an acute pericardial effusion, Was s/p pigtail placement. Remains on mechanical vent support post op due to effects of sedation. Not able to get any hx from pt, not able to get ROS from pt. The patient is critically ill and can not provide additional history due to Unconsciousness, Ventilated, Unable to speak and Unable to comprehend. Retired . Now works with [employer] and [employer].   Nonsmoker  No Etoh    Past Medical History: Diagnosis Date    Atrial fibrillation ([facility] [address]     CAD (coronary artery disease)     Headache     Hypertension     Hypothyroidism     Stroke [facility])       Past Surgical History:   Procedure Laterality Date    HX APPENDECTOMY  1947    HX CATARACT REMOVAL  2009 x 2    HX THYROIDECTOMY  1950 Partial,    2003 Full      Prior to Admission medications    Medication Sig Start Date End Date Taking? Authorizing Provider   VIT C/E/ZN/COPPR/LUTEIN/ZEAXAN (PRESERVISION AREDS 2 PO) Take  by mouth two (2) times a day. Yes Historical Provider   latanoprost (XALATAN) 0.005 % ophthalmic solution Administer 1 Drop to both eyes nightly. 2/22/18  Yes Historical Provider   aspirin 81 mg chewable tablet Take 1 Tab by mouth daily. 2/2/18  Yes Erlinda Deras MD   levothyroxine (SYNTHROID) 75 mcg tablet Take 1 Tab by mouth Daily (before breakfast). 2/2/18  Yes Erlinda Deras MD   losartan (COZAAR) 100 mg tablet Take 100 mg by mouth daily. Yes Pat Mar MD   metoprolol succinate (TOPROL-XL) 25 mg XL tablet Take 25 mg by mouth daily. Yes Pat Mar MD   tamsulosin (FLOMAX) 0.4 mg capsule Take 0.8 mg by mouth daily. Yes Pat Mar MD   timolol (TIMOPTIC) 0.5 % ophthalmic solution Administer 1 Drop to both eyes daily. Yes Pat Mar MD   rivaroxaban (XARELTO) 20 mg tab tablet Take 1 Tab by mouth daily (with dinner).  Indications: PREVENT THROMBOEMBOLISM IN CHRONIC ATRIAL FIBRILLATION 2/1/18   Erlinda Deras MD     Current Facility-Administered Medications   Medication Dose Route Frequency    lactated Ringers infusion  25 mL/hr IntraVENous CONTINUOUS    sodium chloride (NS) flush 5-10 mL  5-10 mL IntraVENous Q8H    lactated Ringers infusion  25 mL/hr IntraVENous CONTINUOUS    [START ON 4/10/2018] tamsulosin (FLOMAX) capsule 0.8 mg  0.8 mg Oral DAILY    [START ON 4/10/2018] timolol (TIMOPTIC) 0.5 % ophthalmic solution 1 Drop  1 Drop Both Eyes DAILY    [START ON 4/10/2018] aspirin chewable tablet 81 mg  81 mg Oral DAILY    [START ON 4/10/2018] levothyroxine (SYNTHROID) tablet 75 mcg  75 mcg Oral ACB    latanoprost (XALATAN) 0.005 % ophthalmic solution 1 Drop  1 Drop Both Eyes QHS    0.9% sodium chloride infusion 1,000 mL  1,000 mL IntraVENous CONTINUOUS    sodium chloride (NS) flush 5-10 mL  5-10 mL IntraVENous Q8H    lidocaine-EPINEPHrine (XYLOCAINE) 1 %-1:100,000 injection        sodium chloride (NS) flush 5-10 mL  5-10 mL IntraVENous Q8H    ketorolac (TORADOL) injection 15 mg  15 mg IntraVENous Q6H    propofol (DIPRIVAN) infusion  5-50 mcg/kg/min IntraVENous TITRATE    dexamethasone (DECADRON) 4 mg/mL injection 6 mg  6 mg IntraVENous ONCE    dexamethasone (DECADRON) 4 mg/mL injection 4 mg  4 mg IntraVENous Q8H    chlorhexidine (PERIDEX) 0.12 % mouthwash 15 mL  15 mL Oral Q12H    mupirocin (BACTROBAN) 2 % ointment   Both Nostrils Q12H    [START ON 4/10/2018] pantoprazole (PROTONIX) 40 mg in sodium chloride 0.9% 10 mL injection  40 mg IntraVENous DAILY     Allergies   Allergen Reactions    Chocolate Flavor Other (comments)      Social History   Substance Use Topics    Smoking status: Never Smoker    Smokeless tobacco: Never Used    Alcohol use No      History reviewed. No pertinent family history. Review of Systems:  Review of systems not obtained due to patient factors.     Objective:   Vital Signs:    Visit Vitals    /72    Pulse 84    Temp 98.1 °F (36.7 °C)    Resp 14    Ht 6' 3\" (1.905 m)    Wt 96.2 kg (212 lb)    SpO2 100%    BMI 26.5 kg/m2       O2 Device: Endotracheal tube       Temp (24hrs), Av.5 °F (36.4 °C), Min:95.3 °F (35.2 °C), Max:98.5 °F (36.9 °C)       Intake/Output:   Last shift:       0701 -  1900  In: 1000 [I.V.:1000]  Out: 710 [Urine:310; Drains:350]  Last 3 shifts:      Intake/Output Summary (Last 24 hours) at 18 1732  Last data filed at 18 1702   Gross per 24 hour   Intake             1000 ml   Output              710 ml   Net              290 ml Hemodynamics:   PAP:   CO:     Wedge:   CI:     CVP:    SVR:       PVR:       Ventilator Settings:  Mode Rate Tidal Volume Pressure FiO2 PEEP   Assist control   500 ml    40 % 6 cm H20     Peak airway pressure: 23 cm H2O    Minute ventilation: 8.12 l/min      Physical Exam:    General:  Intubated, sedated, no distress, appears stated age. Head:  Normocephalic, without obvious abnormality, atraumatic. Eyes:  Conjunctivae/corneas clear. PERRL, EOMs intact. Nose: Nares normal. Septum midline. Mucosa normal. No drainage or sinus tenderness. Throat: Lips, mucosa, and tongue normal. Teeth and gums normal.   Neck: Supple, symmetrical, trachea midline, no adenopathy, thyroid: no enlargment/tenderness/nodules, no carotid bruit and no JVD. Back:   Symmetric, no curvature. ROM normal.   Lungs:   Clear to auscultation bilaterally. Good air movement anteriorly. Chest wall:  No tenderness or deformity. Has a subxiphoid pericardial drain sutured in place. . Has about 100cc of blood effusion. Heart:  Regular rate and rhythm, S1, S2 normal, no murmur, click, rub or gallop. Abdomen:   Soft, non-tender. Bowel sounds normal. No masses,  No organomegaly. Extremities: Extremities normal, atraumatic, no cyanosis or edema. Pulses: 2+ and symmetric all extremities. Skin: Skin color, texture, turgor normal. No rashes or lesions   Lymph nodes: Cervical, supraclavicular, and axillary nodes normal.   Neurologic: Grossly nonfocal, moving all extremities. Psych: no able to assess due to being intubated and sedated.         Data:     Recent Results (from the past 24 hour(s))   POC ACTIVATED CLOTTING TIME    Collection Time: 04/09/18  1:52 PM   Result Value Ref Range    Activated Clotting Time (POC) 307 (H) 79 - 138 SECS   POC ACTIVATED CLOTTING TIME    Collection Time: 04/09/18  2:20 PM   Result Value Ref Range    Activated Clotting Time (POC) 340 (H) 79 - 138 SECS   POC ACTIVATED CLOTTING TIME    Collection Time: 04/09/18  3:11 PM   Result Value Ref Range    Activated Clotting Time (POC) 136 79 - 138 SECS             Imaging:  I have personally reviewed the patients radiographs and have reviewed the reports:  Pending       Nguyen Amaya MD

## 2018-04-09 NOTE — H&P (VIEW-ONLY)
Subjective:      Aftab Portillo is a 78 y.o. male is here for EP consult. Pt has a H/O TIAs in the past; he is on Xarelto and ASA. The patient denies chest pain/ shortness of breath, orthopnea, PND, LE edema,syncope, or fatigue. Mr Alejandra Welsh has long hx of TIAs post atrial fibrillation episodes begininning in 2005. He was last seen by EP in 2011. He is here after 2 recent TIAs post atrial fibrillation ON Xarelto. He admits to periodic dizziness not related to positional changes. Patient Active Problem List    Diagnosis Date Noted    TIA (transient ischemic attack) 02/01/2018    Broca's aphasia 01/31/2018    Paroxysmal atrial fibrillation ([facility] [address] 01/31/2018    Chronic anticoagulation 03/64/1857    Systolic CHF, chronic ([facility] [address] 01/31/2018    Acquired hypothyroidism 01/31/2018    Essential hypertension 01/31/2018      Pat Mar MD  Past Medical History:   Diagnosis Date    Atrial fibrillation ([facility] [address]     CAD (coronary artery disease)     Headache     Hypertension     Hypothyroidism     Stroke [facility])       Past Surgical History:   Procedure Laterality Date    HX APPENDECTOMY  1947    HX CATARACT REMOVAL  2009 x 2    HX THYROIDECTOMY  1950 Partial,    2003 Full     Allergies   Allergen Reactions    Chocolate Flavor Other (comments)      No family history on file. negative for cardiac disease  Social History     Social History    Marital status:      Spouse name: N/A    Number of children: N/A    Years of education: N/A     Social History Main Topics    Smoking status: Never Smoker    Smokeless tobacco: Never Used    Alcohol use No    Drug use: None    Sexual activity: Not Asked     Other Topics Concern    None     Social History Narrative     Current Outpatient Prescriptions   Medication Sig    VIT C/E/ZN/COPPR/LUTEIN/ZEAXAN (PRESERVISION AREDS 2 PO) Take  by mouth two (2) times a day.     latanoprost (XALATAN) 0.005 % ophthalmic solution Administer 1 Drop to both eyes nightly.  aspirin 81 mg chewable tablet Take 1 Tab by mouth daily.  levothyroxine (SYNTHROID) 75 mcg tablet Take 1 Tab by mouth Daily (before breakfast).  rivaroxaban (XARELTO) 20 mg tab tablet Take 1 Tab by mouth daily (with dinner). Indications: PREVENT THROMBOEMBOLISM IN CHRONIC ATRIAL FIBRILLATION    losartan (COZAAR) 100 mg tablet Take 100 mg by mouth daily.  metoprolol succinate (TOPROL-XL) 25 mg XL tablet Take 25 mg by mouth daily.  tamsulosin (FLOMAX) 0.4 mg capsule Take 0.8 mg by mouth daily.  timolol (TIMOPTIC) 0.5 % ophthalmic solution Administer 1 Drop to both eyes daily. No current facility-administered medications for this visit. Vitals:    03/20/18 1437 03/20/18 1438   BP: (!) 140/100 150/90   Pulse: 60    Resp: 16    SpO2: 99%    Weight: 219 lb 4.8 oz (99.5 kg)    Height: 6' 3\" (1.905 m)        I have reviewed the nurses notes, vitals, problem list, allergy list, medical history, family, social history and medications. Review of Symptoms:    General: Pt denies excessive weight gain or loss. Pt is able to conduct ADL's  HEENT: Denies blurred vision, headaches, epistaxis and difficulty swallowing. Respiratory: Denies shortness of breath, DOWNS, wheezing or stridor. Cardiovascular: Denies precordial pain, palpitations, edema or PND  Gastrointestinal: Denies poor appetite, indigestion, abdominal pain or blood in stool  Urinary: Denies dysuria, pyuria  Musculoskeletal: Denies pain or swelling from muscles or joints  Neurologic: Denies tremor, paresthesias, or sensory motor disturbance  Skin: Denies rash, itching or texture change. Psych: Denies depression      Physical Exam:      General: Well developed, in no acute distress. HEENT: Eyes - PERRL, no jvd  Heart:  Normal S1/S2 negative S3 or S4. Regular, no murmur, gallop or rub.   Respiratory: Clear bilaterally x 4, no wheezing or rales  Extremities:  No edema, normal cap refill, no cyanosis.   Musculoskeletal: No clubbing  Neuro: A&Ox3, speech clear, gait stable. Skin: Skin color is normal. No rashes or lesions. Non diaphoretic  Vascular: 2+ pulses symmetric in all extremities    Cardiographics    Ekg: Sinus  Rhythm   WITHIN NORMAL LIMITS  Ventricular rate 61. Results for orders placed or performed during the hospital encounter of 03/16/18   EKG, 12 LEAD, INITIAL   Result Value Ref Range    Ventricular Rate 74 BPM    Atrial Rate 74 BPM    P-R Interval 202 ms    QRS Duration 100 ms    Q-T Interval 382 ms    QTC Calculation (Bezet) 424 ms    Calculated P Axis 43 degrees    Calculated R Axis 19 degrees    Calculated T Axis 59 degrees    Diagnosis       Normal sinus rhythm  Normal ECG  When compared with ECG of 31-JAN-2018 11:53,  No significant change was found  Confirmed by Maggie Galvan MD, --- ([id number]) on 3/17/2018 7:02:13 AM           Lab Results   Component Value Date/Time    WBC 8.9 03/16/2018 02:35 PM    HGB 13.7 03/16/2018 02:35 PM    HCT 40.3 03/16/2018 02:35 PM    PLATELET 189 20/11/9399 02:35 PM    MCV 83.6 03/16/2018 02:35 PM      Lab Results   Component Value Date/Time    Sodium 137 03/16/2018 02:35 PM    Potassium 3.8 03/16/2018 02:35 PM    Chloride 99 03/16/2018 02:35 PM    CO2 31 03/16/2018 02:35 PM    Anion gap 7 03/16/2018 02:35 PM    Glucose 124 (H) 03/16/2018 02:35 PM    BUN 16 03/16/2018 02:35 PM    Creatinine 0.89 03/16/2018 02:35 PM    BUN/Creatinine ratio 18 03/16/2018 02:35 PM    GFR est AA >60 03/16/2018 02:35 PM    GFR est non-AA >60 03/16/2018 02:35 PM    Calcium 8.4 (L) 03/16/2018 02:35 PM    Bilirubin, total 0.7 03/16/2018 02:35 PM    AST (SGOT) 13 (L) 03/16/2018 02:35 PM    Alk.  phosphatase 92 03/16/2018 02:35 PM    Protein, total 6.7 03/16/2018 02:35 PM    Albumin 3.5 03/16/2018 02:35 PM    Globulin 3.2 03/16/2018 02:35 PM    A-G Ratio 1.1 03/16/2018 02:35 PM    ALT (SGPT) 20 03/16/2018 02:35 PM      Lab Results   Component Value Date/Time    TSH 0.55 02/01/2018 05:53 AM Assessment:            ICD-10-CM ICD-9-CM    1. Paroxysmal atrial fibrillation (HCC) I48.0 427.31    2. Essential hypertension I10 401.9 VIT C/E/ZN/COPPR/LUTEIN/ZEAXAN (PRESERVISION AREDS 2 PO)      latanoprost (XALATAN) 0.005 % ophthalmic solution      AMB POC EKG ROUTINE W/ 12 LEADS, INTER & REP   3. Transient cerebral ischemia, unspecified type G45.9 435.9    4. Chronic anticoagulation Z79.01 V58.61      Orders Placed This Encounter    AMB POC EKG ROUTINE W/ 12 LEADS, INTER & REP     Order Specific Question:   Reason for Exam:     Answer:   routine    VIT C/E/ZN/COPPR/LUTEIN/ZEAXAN (PRESERVISION AREDS 2 PO)     Sig: Take  by mouth two (2) times a day.  latanoprost (XALATAN) 0.005 % ophthalmic solution     Sig: Administer 1 Drop to both eyes nightly. Plan:     Mr Noman Leavitt is a pleasant 78year old male with AF and TIAs. Pt is a candidate for a MAXINE, afib ablation, and ILR. I discussed the risks/benefits/alternatives of the procedure with the patient. Risks include (but are not limited to) bleeding, heart block, infection, cva/mi/tamponade/esophageal perforation/pv stenosis/death. The patient understands that there is a 5-0% major complication rate and agrees to proceed. Thank you for this interesting consultation. Continue medical management for TIAs, AF and htn. Thank you for allowing me to participate in Lisa Forrest 's care. Florette Fleischer, NP    Thank you for allowing me to participate in this patients care.     Ana Cr MD, [employer]

## 2018-04-09 NOTE — INTERVAL H&P NOTE
H&P Update:  Lisa Brothers was seen and examined. History and physical has been reviewed. The patient has been examined.  There have been no significant clinical changes since the completion of the originally dated History and Physical.    Signed By: Lizette Woodson MD     April 9, 2018 7:49 AM

## 2018-04-09 NOTE — ANESTHESIA POSTPROCEDURE EVALUATION
Post-Anesthesia Evaluation and Assessment    Patient: Shena Torres MRN: [id number]  SSN: [id number]    YOB: 1940  Age: 78 y.o. Sex: male       Cardiovascular Function/Vital Signs  Visit Vitals    /78    Pulse 86    Temp 36.7 °C (98.1 °F)    Resp 15    Ht 6' 3\" (1.905 m)    Wt 96.2 kg (212 lb)    SpO2 100%    BMI 26.5 kg/m2       Patient is status post general anesthesia for * No procedures listed *. Nausea/Vomiting: None    Postoperative hydration reviewed and adequate. Pain:      Managed    Neurological Status: At baseline    Mental Status and Level of Consciousness: Arousable    Pulmonary Status:   O2 Device: Endotracheal tube (04/09/18 1612)   Adequate oxygenation and airway patent    Complications related to anesthesia: None    Post-anesthesia assessment completed. Pt to remain on vent post-op, pericardial drain placed and 400 ml blood drawn.       Signed By: Hola Solo MD     April 9, 2018

## 2018-04-09 NOTE — PROGRESS NOTES
Received pt ambulatory from the waiting room. No voiced complaints of cp or sob. Consents signed for alia/ablation/linq. Prep done to chest and both groins. Pt does have a small open sore on left upper back. No reddness or  swelling noted.

## 2018-04-09 NOTE — PROCEDURES
[address] [place] [address]  [phone]    Indications and Pre-Procedure Diagnosis:  Willie Kaba is a 78 y.o. male with AF ablation had hypotension post ablation and bedside echo revealed small to moderate posterior pericardial effusion. It was decided to proceed with emergent pericardiocentesis. Post Procedure Diagnosis:    Pericardial tamponade    Pericardiocentesis Procedure and Findings:  The procedure was emergent. The procedure was performed under general anesthesia. Continuous pulse oximetry and cuff pressure were monitored. During the procedure, the patient received Versed and Fentanyl for sedation per anesthesia personnel. The costophrenic area was prepped and draped in the usual sterile fashion and was liberally infiltrated with 1% lidocaine. Access was achieved via the seldinger technique (under fluoroscopic guidance) with a needle pointed to the ipsilateral nipple from the costophrenic angle. A wire was advanced and the needle removed. A pericardial drain was advanced over the wire and the wire removed. 400 cc of bloody fluid was removed from the pericardium with complete resolution of the effusion (via continuous echo performed at the bedside). The patients hypotension resolved and she was taken off pressor support. The drain was sutured to the skin with 0 silk and covered with a bio-occlusive dressing applied to the skin. Fluoroscopy and total procedure times were 1 and 20 minutes respectively. Estimated blood loss <10 ml. Sharp count: correct. Specimen(s) collected: none. The following procedure related complication occurred: none. The following problems were encountered: none. Findings: successful pericardiocentesis and drain placement. Supplies Summary available in the chart      Thank you for allowing me to participate in this patients care.     Henry Bowens MD, [employer]

## 2018-04-09 NOTE — PROGRESS NOTES
Problem: Non-Violent Restraints  Goal: *Removal from restraints as soon as assessed to be safe  Restraint type: Soft restraint:  right wrist and left wrist  Reason for restraints: Interference with medical treatment  Duration: 24 hours    Restraints must be removed when an alternative is available and effective and/or patient no longer meets criteria. Orders must be renewed every calendar day or when discontinued.     The MD must conduct a face to face assessment within 1 calendar day of initiation when initial restraint order is verbal.

## 2018-04-09 NOTE — PROGRESS NOTES
[facility]  Pulmonary, Critical Care, and Sleep Medicine    Name: Adriana Soares MRN: [id number]   : 1940 Hospital: [facility]   Date: 2018        Critical Care Initial Patient Consult    IMPRESSION:   · S/p Afib ablation  · Pericardial Effusion  · Acute post op respiratory support  · Hypotension noted earlier now resolved. · Risk of harming himself placed  Order for bilateral wrist restraints. · Prior CVA, TIA  · Hypertension  · Hypothyroidism  · Has been on Chronic Anticoagulation with Xarelto and ASA. · Critically ill, moderate to high risk of decompensation. On mechanical vent support. Had pericardial drain placed. 35 min CC, EOP. Discused with nursing. Discussed with Dr. Syed Soto.       RECOMMENDATIONS:   · Will give dose of decadron in cased of laryngeal edema or inflammation. · Vent support for now,When aka  · Will check CXR   · When awake will do a SAT and SBT, will check ABG prior to extubation  · Sedation with diprivan, target RASS of zero to minus 1.  · Monitor pericardial drain output. · Check repeat labs in am.  · Repeat CXR in am.      Subjective/History: This patient has been seen and evaluated at the request of Dr. Syed Soto for above. Patient is a 78 y.o. male who was brought from the cath lab. Was having a fib ablation, had an acute pericardial effusion, Was s/p pigtail placement. Remains on mechanical vent support post op due to effects of sedation. Not able to get any hx from pt, not able to get ROS from pt. The patient is critically ill and can not provide additional history due to Unconsciousness, Ventilated, Unable to speak and Unable to comprehend.      Past Medical History:   Diagnosis Date    Atrial fibrillation ([address]     CAD (coronary artery disease)     Headache     Hypertension     Hypothyroidism     [facility])       Past Surgical History:   Procedure Laterality Date    HX APPENDECTOMY      HX CATARACT REMOVAL  2009 x 2    HX THYROIDECTOMY  1950 Partial,    2003 Full      Prior to Admission medications    Medication Sig Start Date End Date Taking? Authorizing Provider   VIT C/E/ZN/COPPR/LUTEIN/ZEAXAN (PRESERVISION AREDS 2 PO) Take  by mouth two (2) times a day. Yes Historical Provider   latanoprost (XALATAN) 0.005 % ophthalmic solution Administer 1 Drop to both eyes nightly. 2/22/18  Yes Historical Provider   aspirin 81 mg chewable tablet Take 1 Tab by mouth daily. 2/2/18  Yes Roger Ramos MD   levothyroxine (SYNTHROID) 75 mcg tablet Take 1 Tab by mouth Daily (before breakfast). 2/2/18  Yes Roger Ramos MD   losartan (COZAAR) 100 mg tablet Take 100 mg by mouth daily. Yes Pat Mar MD   metoprolol succinate (TOPROL-XL) 25 mg XL tablet Take 25 mg by mouth daily. Yes Pat Mar MD   tamsulosin (FLOMAX) 0.4 mg capsule Take 0.8 mg by mouth daily. Yes Pat Mar MD   timolol (TIMOPTIC) 0.5 % ophthalmic solution Administer 1 Drop to both eyes daily. Yes Pat Mar MD   rivaroxaban (XARELTO) 20 mg tab tablet Take 1 Tab by mouth daily (with dinner).  Indications: PREVENT THROMBOEMBOLISM IN CHRONIC ATRIAL FIBRILLATION 2/1/18   Roger Ramos MD     Current Facility-Administered Medications   Medication Dose Route Frequency    [START ON 4/10/2018] tamsulosin (FLOMAX) capsule 0.8 mg  0.8 mg Oral DAILY    [START ON 4/10/2018] timolol (TIMOPTIC) 0.5 % ophthalmic solution 1 Drop  1 Drop Both Eyes DAILY    [START ON 4/10/2018] aspirin chewable tablet 81 mg  81 mg Oral DAILY    [START ON 4/10/2018] levothyroxine (SYNTHROID) tablet 75 mcg  75 mcg Oral ACB    latanoprost (XALATAN) 0.005 % ophthalmic solution 1 Drop  1 Drop Both Eyes QHS    0.9% sodium chloride infusion 1,000 mL  1,000 mL IntraVENous CONTINUOUS    sodium chloride (NS) flush 5-10 mL  5-10 mL IntraVENous Q8H    lidocaine-EPINEPHrine (XYLOCAINE) 1 %-1:100,000 injection        sodium chloride (NS) flush 5-10 mL  5-10 mL IntraVENous Q8H    ketorolac (TORADOL) injection 15 mg  15 mg IntraVENous Q6H    propofol (DIPRIVAN) infusion  5-50 mcg/kg/min IntraVENous TITRATE     Allergies   Allergen Reactions    Chocolate Flavor Other (comments)      Social History   Substance Use Topics    Smoking status: Never Smoker    Smokeless tobacco: Never Used    Alcohol use No      History reviewed. No pertinent family history. Review of Systems:  Review of systems not obtained due to patient factors. Objective:   Vital Signs:    Visit Vitals    /78    Pulse 86    Temp 98.1 °F (36.7 °C)    Resp 15    Ht 6' 3\" (1.905 m)    Wt 96.2 kg (212 lb)    SpO2 100%    BMI 26.5 kg/m2       O2 Device: Endotracheal tube       Temp (24hrs), Av.5 °F (36.4 °C), Min:95.3 °F (35.2 °C), Max:98.5 °F (36.9 °C)       Intake/Output:   Last shift:       0701 -  1900  In: 1000 [I.V.:1000]  Out: 650 [Urine:250; Drains:350]  Last 3 shifts:      Intake/Output Summary (Last 24 hours) at 18 1618  Last data filed at 18 1608   Gross per 24 hour   Intake             1000 ml   Output              650 ml   Net              350 ml     Hemodynamics:   PAP:   CO:     Wedge:   CI:     CVP:    SVR:       PVR:       Ventilator Settings:  Mode Rate Tidal Volume Pressure FiO2 PEEP   Assist control   500 ml    40 % 6 cm H20     Peak airway pressure: 23 cm H2O    Minute ventilation: 8.12 l/min      Physical Exam:    General:  Intubated, sedated, no distress, appears stated age. Head:  Normocephalic, without obvious abnormality, atraumatic. Eyes:  Conjunctivae/corneas clear. PERRL, EOMs intact. Nose: Nares normal. Septum midline. Mucosa normal. No drainage or sinus tenderness. Throat: Lips, mucosa, and tongue normal. Teeth and gums normal.   Neck: Supple, symmetrical, trachea midline, no adenopathy, thyroid: no enlargment/tenderness/nodules, no carotid bruit and no JVD. Back:   Symmetric, no curvature.  ROM normal.   Lungs:   Clear to auscultation bilaterally. Good air movement anteriorly. Chest wall:  No tenderness or deformity. Has a subxiphoid pericardial drain sutured in place. [place] Heart:  Regular rate and rhythm, S1, S2 normal, no murmur, click, rub or gallop. Abdomen:   Soft, non-tender. Bowel sounds normal. No masses,  No organomegaly. Extremities: Extremities normal, atraumatic, no cyanosis or edema. Pulses: 2+ and symmetric all extremities. Skin: Skin color, texture, turgor normal. No rashes or lesions   Lymph nodes: Cervical, supraclavicular, and axillary nodes normal.   Neurologic: Grossly nonfocal, moving all extremities. Psych: no able to assess due to being intubated and sedated.         Data:     Recent Results (from the past 24 hour(s))   POC ACTIVATED CLOTTING TIME    Collection Time: 04/09/18  1:52 PM   Result Value Ref Range    Activated Clotting Time (POC) 307 (H) 79 - 138 SECS   POC ACTIVATED CLOTTING TIME    Collection Time: 04/09/18  2:20 PM   Result Value Ref Range    Activated Clotting Time (POC) 340 (H) 79 - 138 SECS   POC ACTIVATED CLOTTING TIME    Collection Time: 04/09/18  3:11 PM   Result Value Ref Range    Activated Clotting Time (POC) 136 79 - 138 SECS             Telemetry:normal sinus rhythm    Imaging:  I have personally reviewed the patients radiographs and have reviewed the reports:  Pending       Alexus Lorenz MD

## 2018-04-09 NOTE — PROGRESS NOTES
Pt's became hypotensive requiring katherine gtt. Emergent bedside limited echo demonstrated small to moderate pericardial effusion with no tamponade physiology. It was decided to proceed with pericardiocentesis and drain placement due to the hypotension. See procedure note. Thank you for allowing me to participate in this patients care.     Cullen Palencia MD, Kimberly Brooks

## 2018-04-09 NOTE — PROGRESS NOTES
1610  Received into CCU 2552 from EP lab s/p ablation with pericardial drain placement. Connected to monitors and vent; patient on propofol for sedation. Primary Nurse Savannah Curry RN and Curly Allison RN performed a dual skin assessment on this patient No impairment noted  Herrera score is 14  Dr. Olimpia Britt at bedside to evaluate patient; STAT PCXR ordered. Patient has periods of agitation, titrating propofol to effect. 1700  Still awaiting xray; again phoned radiology department re: Paola Murillo. Plan to keep patient intubated tonight per Dr. Olimpia Britt. Patient's wife at the bedside, tearful; status update given. Dr. Olimpia Britt also spoke with wife. 1800  Vitals stable; patient resting quietly. [address] now completed. Bedside and verbal report given to Jayla Garcia RN.

## 2018-04-10 ENCOUNTER — APPOINTMENT (OUTPATIENT)
Dept: GENERAL RADIOLOGY | Age: 78
DRG: 274 | End: 2018-04-10
Attending: INTERNAL MEDICINE
Payer: MEDICARE

## 2018-04-10 LAB
ALBUMIN SERPL-MCNC: 3.4 G/DL (ref 3.5–5)
ALBUMIN/GLOB SERPL: 1.3 {RATIO} (ref 1.1–2.2)
ALP SERPL-CCNC: 76 U/L (ref 45–117)
ALT SERPL-CCNC: 25 U/L (ref 12–78)
ANION GAP SERPL CALC-SCNC: 9 MMOL/L (ref 5–15)
AST SERPL-CCNC: 49 U/L (ref 15–37)
ATRIAL RATE: 79 BPM
BASOPHILS # BLD: 0 K/UL (ref 0–0.1)
BASOPHILS NFR BLD: 0 % (ref 0–1)
BILIRUB SERPL-MCNC: 0.6 MG/DL (ref 0.2–1)
BUN SERPL-MCNC: 14 MG/DL (ref 6–20)
BUN/CREAT SERPL: 23 (ref 12–20)
CALCIUM SERPL-MCNC: 7.8 MG/DL (ref 8.5–10.1)
CALCULATED P AXIS, ECG09: 40 DEGREES
CALCULATED R AXIS, ECG10: 31 DEGREES
CALCULATED T AXIS, ECG11: 66 DEGREES
CHLORIDE SERPL-SCNC: 108 MMOL/L (ref 97–108)
CO2 SERPL-SCNC: 23 MMOL/L (ref 21–32)
CREAT SERPL-MCNC: 0.62 MG/DL (ref 0.7–1.3)
DIAGNOSIS, 93000: NORMAL
DIFFERENTIAL METHOD BLD: ABNORMAL
EOSINOPHIL # BLD: 0 K/UL (ref 0–0.4)
EOSINOPHIL NFR BLD: 0 % (ref 0–7)
ERYTHROCYTE [DISTWIDTH] IN BLOOD BY AUTOMATED COUNT: 13.5 % (ref 11.5–14.5)
GLOBULIN SER CALC-MCNC: 2.7 G/DL (ref 2–4)
GLUCOSE SERPL-MCNC: 133 MG/DL (ref 65–100)
HCT VFR BLD AUTO: 37.2 % (ref 36.6–50.3)
HGB BLD-MCNC: 12.6 G/DL (ref 12.1–17)
IMM GRANULOCYTES # BLD: 0.1 K/UL (ref 0–0.04)
IMM GRANULOCYTES NFR BLD AUTO: 1 % (ref 0–0.5)
LYMPHOCYTES # BLD: 0.5 K/UL (ref 0.8–3.5)
LYMPHOCYTES NFR BLD: 5 % (ref 12–49)
MAGNESIUM SERPL-MCNC: 2.1 MG/DL (ref 1.6–2.4)
MCH RBC QN AUTO: 28.9 PG (ref 26–34)
MCHC RBC AUTO-ENTMCNC: 33.9 G/DL (ref 30–36.5)
MCV RBC AUTO: 85.3 FL (ref 80–99)
MONOCYTES # BLD: 0.5 K/UL (ref 0–1)
MONOCYTES NFR BLD: 5 % (ref 5–13)
NEUTS SEG # BLD: 8.6 K/UL (ref 1.8–8)
NEUTS SEG NFR BLD: 89 % (ref 32–75)
NRBC # BLD: 0 K/UL (ref 0–0.01)
NRBC BLD-RTO: 0 PER 100 WBC
P-R INTERVAL, ECG05: 198 MS
PHOSPHATE SERPL-MCNC: 2.9 MG/DL (ref 2.6–4.7)
PLATELET # BLD AUTO: 164 K/UL (ref 150–400)
PMV BLD AUTO: 9.8 FL (ref 8.9–12.9)
POTASSIUM SERPL-SCNC: 4 MMOL/L (ref 3.5–5.1)
PROT SERPL-MCNC: 6.1 G/DL (ref 6.4–8.2)
Q-T INTERVAL, ECG07: 400 MS
QRS DURATION, ECG06: 104 MS
QTC CALCULATION (BEZET), ECG08: 458 MS
RBC # BLD AUTO: 4.36 M/UL (ref 4.1–5.7)
RBC MORPH BLD: ABNORMAL
SODIUM SERPL-SCNC: 140 MMOL/L (ref 136–145)
VENTRICULAR RATE, ECG03: 79 BPM
WBC # BLD AUTO: 9.7 K/UL (ref 4.1–11.1)

## 2018-04-10 PROCEDURE — 74011000250 HC RX REV CODE- 250: Performed by: INTERNAL MEDICINE

## 2018-04-10 PROCEDURE — G8987 SELF CARE CURRENT STATUS: HCPCS

## 2018-04-10 PROCEDURE — 97530 THERAPEUTIC ACTIVITIES: CPT

## 2018-04-10 PROCEDURE — 85025 COMPLETE CBC W/AUTO DIFF WBC: CPT | Performed by: INTERNAL MEDICINE

## 2018-04-10 PROCEDURE — 97116 GAIT TRAINING THERAPY: CPT

## 2018-04-10 PROCEDURE — 97161 PT EVAL LOW COMPLEX 20 MIN: CPT

## 2018-04-10 PROCEDURE — 94003 VENT MGMT INPAT SUBQ DAY: CPT

## 2018-04-10 PROCEDURE — G8979 MOBILITY GOAL STATUS: HCPCS

## 2018-04-10 PROCEDURE — 84100 ASSAY OF PHOSPHORUS: CPT | Performed by: INTERNAL MEDICINE

## 2018-04-10 PROCEDURE — 97165 OT EVAL LOW COMPLEX 30 MIN: CPT

## 2018-04-10 PROCEDURE — 65660000000 HC RM CCU STEPDOWN

## 2018-04-10 PROCEDURE — G8978 MOBILITY CURRENT STATUS: HCPCS

## 2018-04-10 PROCEDURE — 74011250636 HC RX REV CODE- 250/636: Performed by: INTERNAL MEDICINE

## 2018-04-10 PROCEDURE — 83735 ASSAY OF MAGNESIUM: CPT | Performed by: INTERNAL MEDICINE

## 2018-04-10 PROCEDURE — 74011250637 HC RX REV CODE- 250/637: Performed by: INTERNAL MEDICINE

## 2018-04-10 PROCEDURE — 80053 COMPREHEN METABOLIC PANEL: CPT | Performed by: INTERNAL MEDICINE

## 2018-04-10 PROCEDURE — 71045 X-RAY EXAM CHEST 1 VIEW: CPT

## 2018-04-10 PROCEDURE — 74011000258 HC RX REV CODE- 258: Performed by: INTERNAL MEDICINE

## 2018-04-10 PROCEDURE — 36415 COLL VENOUS BLD VENIPUNCTURE: CPT | Performed by: INTERNAL MEDICINE

## 2018-04-10 PROCEDURE — G8988 SELF CARE GOAL STATUS: HCPCS

## 2018-04-10 PROCEDURE — 77030018846 HC SOL IRR STRL H20 ICUM -A

## 2018-04-10 PROCEDURE — G8989 SELF CARE D/C STATUS: HCPCS

## 2018-04-10 PROCEDURE — 93308 TTE F-UP OR LMTD: CPT

## 2018-04-10 PROCEDURE — C9113 INJ PANTOPRAZOLE SODIUM, VIA: HCPCS | Performed by: INTERNAL MEDICINE

## 2018-04-10 RX ORDER — DEXAMETHASONE SODIUM PHOSPHATE 4 MG/ML
4 INJECTION, SOLUTION INTRA-ARTICULAR; INTRALESIONAL; INTRAMUSCULAR; INTRAVENOUS; SOFT TISSUE EVERY 8 HOURS
Status: DISCONTINUED | OUTPATIENT
Start: 2018-04-10 | End: 2018-04-11

## 2018-04-10 RX ORDER — SODIUM CHLORIDE 450 MG/100ML
75 INJECTION, SOLUTION INTRAVENOUS CONTINUOUS
Status: DISCONTINUED | OUTPATIENT
Start: 2018-04-10 | End: 2018-04-11

## 2018-04-10 RX ADMIN — PROPOFOL 50 MCG/KG/MIN: 10 INJECTION, EMULSION INTRAVENOUS at 07:24

## 2018-04-10 RX ADMIN — DEXAMETHASONE SODIUM PHOSPHATE 4 MG: 4 INJECTION, SOLUTION INTRAMUSCULAR; INTRAVENOUS at 18:25

## 2018-04-10 RX ADMIN — Medication 10 ML: at 15:25

## 2018-04-10 RX ADMIN — PANTOPRAZOLE SODIUM 40 MG: 40 INJECTION, POWDER, FOR SOLUTION INTRAVENOUS at 08:24

## 2018-04-10 RX ADMIN — DEXAMETHASONE SODIUM PHOSPHATE 4 MG: 4 INJECTION, SOLUTION INTRAMUSCULAR; INTRAVENOUS at 01:25

## 2018-04-10 RX ADMIN — LATANOPROST 1 DROP: 50 SOLUTION OPHTHALMIC at 22:29

## 2018-04-10 RX ADMIN — MUPIROCIN: 20 OINTMENT TOPICAL at 22:27

## 2018-04-10 RX ADMIN — PROPOFOL 30 MCG/KG/MIN: 10 INJECTION, EMULSION INTRAVENOUS at 02:34

## 2018-04-10 RX ADMIN — DEXAMETHASONE SODIUM PHOSPHATE 4 MG: 4 INJECTION, SOLUTION INTRAMUSCULAR; INTRAVENOUS at 10:19

## 2018-04-10 RX ADMIN — KETOROLAC TROMETHAMINE 15 MG: 30 INJECTION, SOLUTION INTRAMUSCULAR at 00:16

## 2018-04-10 RX ADMIN — Medication 10 ML: at 22:28

## 2018-04-10 RX ADMIN — SODIUM CHLORIDE 1000 ML: 900 INJECTION, SOLUTION INTRAVENOUS at 01:38

## 2018-04-10 RX ADMIN — KETOROLAC TROMETHAMINE 15 MG: 30 INJECTION, SOLUTION INTRAMUSCULAR at 23:43

## 2018-04-10 RX ADMIN — LEVOTHYROXINE SODIUM 75 MCG: 75 TABLET ORAL at 10:18

## 2018-04-10 RX ADMIN — Medication 10 ML: at 22:26

## 2018-04-10 RX ADMIN — Medication 10 ML: at 15:26

## 2018-04-10 RX ADMIN — Medication 10 ML: at 22:27

## 2018-04-10 RX ADMIN — MUPIROCIN: 20 OINTMENT TOPICAL at 09:26

## 2018-04-10 RX ADMIN — TAMSULOSIN HYDROCHLORIDE 0.8 MG: 0.4 CAPSULE ORAL at 11:20

## 2018-04-10 RX ADMIN — KETOROLAC TROMETHAMINE 15 MG: 30 INJECTION, SOLUTION INTRAMUSCULAR at 18:25

## 2018-04-10 RX ADMIN — KETOROLAC TROMETHAMINE 15 MG: 30 INJECTION, SOLUTION INTRAMUSCULAR at 05:32

## 2018-04-10 RX ADMIN — KETOROLAC TROMETHAMINE 15 MG: 30 INJECTION, SOLUTION INTRAMUSCULAR at 12:23

## 2018-04-10 RX ADMIN — TIMOLOL MALEATE 1 DROP: 5 SOLUTION/ DROPS OPHTHALMIC at 08:25

## 2018-04-10 RX ADMIN — SODIUM CHLORIDE 75 ML/HR: 450 INJECTION, SOLUTION INTRAVENOUS at 10:17

## 2018-04-10 RX ADMIN — ASPIRIN 81 MG 81 MG: 81 TABLET ORAL at 11:20

## 2018-04-10 RX ADMIN — CHLORHEXIDINE GLUCONATE 15 ML: 1.2 RINSE ORAL at 09:27

## 2018-04-10 NOTE — PROGRESS NOTES
2300: Left upper chest dressing noted to be bleeding. Held pressure at site. Reinforced with op-site dressing. Ice pack applied. 0630: Passing SAT and SBT. GALVAN to commands and nodding head appropriately. 7927: Patient agitated. Kicking foot board and banging hands on rails. Unable to calm patient. Wanting ETT out. Restarted low dose propofol.

## 2018-04-10 NOTE — PROGRESS NOTES
Cardiac Electrophysiology Progress Note            [address], [place], [address] [facility]  [phone]    4/10/2018 8:43 AM    Admit Date: 4/9/2018    Admit Diagnosis: a fib  PVI  A-fib ([address]  Pericardial tamponade    Subjective:     Joon Santo is a 78 y.o. male with AF ablation (4/9/18) had hypotension post ablation and bedside echo revealed small to moderate posterior pericardial effusion. It was decided to proceed with emergent pericardiocentesis. He remains intubated and on a ventilator. Bedside echo was done this morning. Not able to get any hx from pt, not able to get ROS from pt.      Visit Vitals    BP 98/65 (BP 1 Location: Right arm, BP Patient Position: At rest)    Pulse 84    Temp 97.6 °F (36.4 °C)    Resp 17    Ht 6' 3\" (1.905 m)    Wt 212 lb (96.2 kg)    SpO2 95%    BMI 26.5 kg/m2     Current Facility-Administered Medications   Medication Dose Route Frequency    lactated Ringers infusion  25 mL/hr IntraVENous CONTINUOUS    sodium chloride (NS) flush 5-10 mL  5-10 mL IntraVENous Q8H    sodium chloride (NS) flush 5-10 mL  5-10 mL IntraVENous PRN    lidocaine (PF) (XYLOCAINE) 10 mg/mL (1 %) injection 0.1 mL  0.1 mL SubCUTAneous PRN    lactated Ringers infusion  25 mL/hr IntraVENous CONTINUOUS    sodium chloride (NS) flush 5-10 mL  5-10 mL IntraVENous PRN    fentaNYL citrate (PF) injection 25 mcg  25 mcg IntraVENous Multiple    meperidine (DEMEROL) injection 12.5 mg  12.5 mg IntraVENous Q5MIN PRN    tamsulosin (FLOMAX) capsule 0.8 mg  0.8 mg Oral DAILY    timolol (TIMOPTIC) 0.5 % ophthalmic solution 1 Drop  1 Drop Both Eyes DAILY    aspirin chewable tablet 81 mg  81 mg Oral DAILY    levothyroxine (SYNTHROID) tablet 75 mcg  75 mcg Oral ACB    latanoprost (XALATAN) 0.005 % ophthalmic solution 1 Drop  1 Drop Both Eyes QHS    0.9% sodium chloride infusion 1,000 mL  1,000 mL IntraVENous CONTINUOUS    sodium chloride (NS) flush 5-10 mL  5-10 mL IntraVENous Q8H    sodium chloride (NS) flush 5-10 mL  5-10 mL IntraVENous PRN    acetaminophen (TYLENOL) tablet 650 mg  650 mg Oral Q4H PRN    HYDROcodone-acetaminophen (NORCO) 5-325 mg per tablet 1 Tab  1 Tab Oral Q4H PRN    sodium chloride (NS) flush 5-10 mL  5-10 mL IntraVENous Q8H    sodium chloride (NS) flush 5-10 mL  5-10 mL IntraVENous PRN    naloxone (NARCAN) injection 0.4 mg  0.4 mg IntraVENous PRN    ketorolac (TORADOL) injection 15 mg  15 mg IntraVENous Q6H    propofol (DIPRIVAN) infusion  5-50 mcg/kg/min IntraVENous TITRATE    dexamethasone (DECADRON) 4 mg/mL injection 4 mg  4 mg IntraVENous Q8H    chlorhexidine (PERIDEX) 0.12 % mouthwash 15 mL  15 mL Oral Q12H    mupirocin (BACTROBAN) 2 % ointment   Both Nostrils Q12H    fentaNYL citrate (PF) injection 50 mcg  50 mcg IntraVENous Q4H PRN    pantoprazole (PROTONIX) 40 mg in sodium chloride 0.9% 10 mL injection  40 mg IntraVENous DAILY         Objective:      Visit Vitals    BP 98/65 (BP 1 Location: Right arm, BP Patient Position: At rest)    Pulse 84    Temp 97.6 °F (36.4 °C)    Resp 17    Ht 6' 3\" (1.905 m)    Wt 212 lb (96.2 kg)    SpO2 95%    BMI 26.5 kg/m2       Physical Exam:  Abdomen: soft, non-tender  Extremities: extremities normal  Heart: regular rate and rhythm  Chest: linq site with blood clot under dressing. Lungs: clear to auscultation bilaterally  Pulses: 2+ and symmetric    Data Review:   Labs:    Recent Labs      04/10/18   0214   WBC  9.7   HGB  12.6   HCT  37.2   PLT  164     Recent Labs      04/10/18   0214   NA  140   K  4.0   CL  108   CO2  23   GLU  133*   BUN  14   CREA  0.62*   CA  7.8*   MG  2.1   PHOS  2.9   ALB  3.4*   TBILI  0.6   SGOT  49*   ALT  25       No results for input(s): TROIQ, CPK, CKMB in the last 72 hours.       Intake/Output Summary (Last 24 hours) at 04/10/18 0855  Last data filed at 04/10/18 0800   Gross per 24 hour   Intake          1709.24 ml   Output             1490 ml   Net           219.24 ml        Echo - no effusion    Telemetry: sinus rhythm, ventricular rate 86    Assessment:     Active Problems:    Essential hypertension (1/31/2018)      TIA (transient ischemic attack) (2/1/2018)      A-fib ([address] (4/9/2018)      Pericardial tamponade (4/9/2018)        Plan:     Moshe Hartman is a 78 y.o. male with AF ablation (4/9/18) had hypotension post ablation and bedside echo revealed small to moderate posterior pericardial effusion. He is S/P emergent pericardiocentesis. This morning he remains in sinus rhythm. Hypotension resolved. Pericardial drain with minimal serosanguinous drainage this morning. Echo did not reveal an effusion. Possibly wean off ventilator and extubate today. Palak Pelletier MD, [facility]  4/10/2018  8:43 AM      Dressing removed from the pericardial site. Suture snipped. Pericardial drain removed without issue. Pressure held until hemostasis achieved. Dressing applied per nursing.

## 2018-04-10 NOTE — PROGRESS NOTES
Interdisciplinary team rounds were held 5/21/2014 with the following team members: 4/10/2018 with the following team members: Care Management, Nursing, Nutrition, Pharmacy and Physician. Plan of care discussed. Goal: Extubate 2lpm, adjust IV fluids and medications. Stand tool evaluation. See MD orders and progress notes for further  interventions and desired outcomes.

## 2018-04-10 NOTE — PROGRESS NOTES
04/10/18 0635   ABCDEF Bundle   SBT Safety Screen Passed Yes   SBT Trial Passed Yes   Weaning Parameters   Spontaneous Breathing Trial Complete Yes   Resp Rate Observed 27   Ve 11.4      RSBI 74

## 2018-04-10 NOTE — PROGRESS NOTES
Problem: Mobility Impaired (Adult and Pediatric)  Goal: *Acute Goals and Plan of Care (Insert Text)  Physical Therapy Goals  Initiated 4/10/2018  1. Patient will move from supine to sit and sit to supine , scoot up and down and roll side to side in bed with independence within 7 day(s). 2.  Patient will transfer from bed to chair and chair to bed with independence using the least restrictive device within 7 day(s). 3.  Patient will perform sit to stand with independence within 7 day(s). 4.  Patient will ambulate with independence for 250 feet with the least restrictive device within 7 day(s). 5.  Patient will ascend/descend 16 stairs with 1 handrail(s) with independence within 7 day(s). physical Therapy EVALUATION  Patient: Joon Santo (54 y.o. male)  Date: 4/10/2018  Primary Diagnosis: a fib  PVI  A-fib (HCC)  Pericardial tamponade    1 Day Post-Op   Precautions:        ASSESSMENT :  Based on the objective data described below, the patient presents with generalized weakness, mildly impaired dynamic balance, and decreased endurance/activity tolerance. Pt received seated EOB, agreeable to participation with therapy. Pt sit>>stand w/ standby assist and ambulated 260ft w/ CGA. Gait speed decreased with mild increase in trunk sway. No overt LOB however gait stability fair. All mobility occurred on RA with VSS throughout, O2 sats 98% post activity. Anticipate that pt will continue to make excellent gains towards therapy goals and overall mobility and be appropriate to discharge home w/ assist of wife and no further skilled therapy needs. Pt will likely only need 1-2 more sessions before discharge, specifically to continue progressing ambulation without RW support as well as assess safety during stair climbing (16 steps to reach bedroom/bathroom). Patient will benefit from skilled intervention to address the above impairments.   Patients rehabilitation potential is considered to be Good  Factors which may influence rehabilitation potential include:   []         None noted  []         Mental ability/status  []         Medical condition  []         Home/family situation and support systems  []         Safety awareness  []         Pain tolerance/management  []         Other:      PLAN :  Recommendations and Planned Interventions:  [x]           Bed Mobility Training             []    Neuromuscular Re-Education  [x]           Transfer Training                   []    Orthotic/Prosthetic Training  [x]           Gait Training                         []    Modalities  [x]           Therapeutic Exercises           []    Edema Management/Control  [x]           Therapeutic Activities            [x]    Patient and Family Training/Education  [x]           Other (comment): stair climbing    Frequency/Duration: Patient will be followed by physical therapy  5 times a week to address goals. Discharge Recommendations: None  Further Equipment Recommendations for Discharge: None     SUBJECTIVE:   Patient stated I used to be a .     OBJECTIVE DATA SUMMARY:   HISTORY:    Past Medical History:   Diagnosis Date    Atrial fibrillation ([facility] [address]     CAD (coronary artery disease)     Headache     Hypertension     Hypothyroidism     Stroke [facility])      Past Surgical History:   Procedure Laterality Date    HX APPENDECTOMY  1947    HX CATARACT REMOVAL  2009 x 2    HX THYROIDECTOMY  1950 Partial,    2003 Full     Prior Level of Function/Home Situation: Independent w/ ambulation and ADLs. Lives at home w/ wife. Still driving. 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 Reports history of 1 fall that occurred as a result of pt slipping down 2 stairs in the middle of the night/dark.    Personal factors and/or comorbidities impacting plan of care:     Home Situation  Home Environment: Private residence  # Steps to Enter: 3  Rails to Enter: No  One/Two Story Residence: Two story, live on 1st floor  # of Interior Steps: 12  Interior Rails: Right  Lift Chair Available: No  Living Alone: No  Support Systems: Spouse/Significant Other/Partner  Patient Expects to be Discharged to[de-identified] Private residence  Current DME Used/Available at Home: None    EXAMINATION/PRESENTATION/DECISION MAKING:   Critical Behavior:  Neurologic State: Alert  Orientation Level: Oriented X4  Cognition: Follows commands     Hearing:   Auditory  Auditory Impairment: None  Skin:  Intact  Edema: BLE edema  Range Of Motion:  AROM: Within functional limits                       Strength:    Strength: Generally decreased, functional                    Tone & Sensation:   Tone: Normal              Sensation: Impaired (intermittent numbness to bilateral hands - resolved )               Coordination:  Coordination: Within functional limits  Vision:      Functional Mobility:  Bed Mobility:  Rolling: Other (comment) (seated EOB upon arrival )           Transfers:  Sit to Stand: Stand-by assistance  Stand to Sit: Stand-by assistance                       Balance:   Sitting: Intact  Standing: Impaired  Standing - Static: Good  Standing - Dynamic : Fair  Ambulation/Gait Training:  Distance (ft): 260 Feet (ft)  Assistive Device: Gait belt  Ambulation - Level of Assistance: Stand-by assistance        Gait Abnormalities: Decreased step clearance        Base of Support: Widened     Speed/Susannah: Pace decreased (<100 feet/min)  Step Length: Right shortened;Left shortened                    Functional Measure:  Barthel Index:    Bathin  Bladder: 10  Bowels: 10  Groomin  Dressing: 10  Feeding: 10  Mobility: 10  Stairs: 0  Toilet Use: 5  Transfer (Bed to Chair and Back): 10  Total: 70       Barthel and G-code impairment scale:  Percentage of impairment CH  0% CI  1-19% CJ  20-39% CK  40-59% CL  60-79% CM  80-99% CN  100%   Barthel Score 0-100 100 99-80 79-60 59-40 20-39 1-19   0   Barthel Score 0-20 20 17-19 13-16 9-12 5-8 1-4 0      The Barthel ADL Index: Guidelines  1. The index should be used as a record of what a patient does, not as a record of what a patient could do. 2. The main aim is to establish degree of independence from any help, physical or verbal, however minor and for whatever reason. 3. The need for supervision renders the patient not independent. 4. A patient's performance should be established using the best available evidence. Asking the patient, friends/relatives and nurses are the usual sources, but direct observation and common sense are also important. However direct testing is not needed. 5. Usually the patient's performance over the preceding 24-48 hours is important, but occasionally longer periods will be relevant. 6. Middle categories imply that the patient supplies over 50 per cent of the effort. 7. Use of aids to be independent is allowed. Shiraz Ludwig., Barthel, D.W. (9419). Functional evaluation: the Barthel Index. [address] [facility] (14)2. Rick Garcia evelio NGUYỄN Courtney, Russ Medina., Ofelia Calabrese., [place], [address] [place] [address] (1999). Measuring the change indisability after inpatient rehabilitation; comparison of the responsiveness of the Barthel Index and Functional Dougherty Measure. Journal of Neurology, Neurosurgery, and Psychiatry, 66(4), 196-913. Herman Goncalves, N.J.A, Elfego Reeder,  LUH.J.M, & Elisa Rodrigues, M.A. (2004.) Assessment of post-stroke quality of life in cost-effectiveness studies: The usefulness of the Barthel Index and the EuroQoL-5D. Quality of Life Research, 13, 142-95       G codes: In compliance with CMSs Claims Based Outcome Reporting, the following G-code set was chosen for this patient based on their primary functional limitation being treated: The outcome measure chosen to determine the severity of the functional limitation was the Barthel Index with a score of 70/100 which was correlated with the impairment scale.     ? Mobility - Walking and Moving Around:     - CURRENT STATUS: CJ - 20%-39% impaired, limited or restricted    - GOAL STATUS: CI - 1%-19% impaired, limited or restricted    - D/C STATUS:  ---------------To be determined---------------      Physical Therapy Evaluation Charge Determination   History Examination Presentation Decision-Making   MEDIUM  Complexity : 1-2 comorbidities / personal factors will impact the outcome/ POC  MEDIUM Complexity : 3 Standardized tests and measures addressing body structure, function, activity limitation and / or participation in recreation  MEDIUM Complexity : Evolving with changing characteristics  MEDIUM Complexity : FOTO score of 26-74      Based on the above components, the patient evaluation is determined to be of the following complexity level: MEDIUM    Pain:  Pain Scale 1: Numeric (0 - 10)  Pain Intensity 1: 0              Activity Tolerance:   VSS throughout on RA  Please refer to the flowsheet for vital signs taken during this treatment. After treatment:   [x]         Patient left in no apparent distress sitting up in chair  []         Patient left in no apparent distress in bed  [x]         Call bell left within reach  [x]         Nursing notified  []         Caregiver present  []         Bed alarm activated    COMMUNICATION/EDUCATION:   The patients plan of care was discussed with: Occupational Therapist and Registered Nurse. [x]         Fall prevention education was provided and the patient/caregiver indicated understanding. [x]         Patient/family have participated as able in goal setting and plan of care. [x]         Patient/family agree to work toward stated goals and plan of care. []         Patient understands intent and goals of therapy, but is neutral about his/her participation. []         Patient is unable to participate in goal setting and plan of care.     Thank you for this referral.  Flor Servin, PT, DPT   Time Calculation: 23 mins

## 2018-04-10 NOTE — PROGRESS NOTES
0700  Bedside and verbal report from Shlomo Hastings RN.  0800  Patient anxious, propofol increased to control anxiety. Echo in progress at bedside. 0900  Dr. Gardenia Bowens here; pericardial drain removed. Left upper chest loop recorder site dressing changed by NP.  0915  Patient awake, placed on CPAP.  0930  Extubated to 2 lpm nasal cannula. 1000  Wife at bedside; patient taking ice chips without difficulty. 1100  Vitals stable. OK to start clear liquids, advance activity, PT and OT consults per Dr. Dino Cruz. 1130  Taylor removed. 1200  Up to chair with two assists. Wife continues at the bedside. Reassessment completed. Emiliano Usdema 1903 on room air; will monitor sats. 1320  Complains that \"chest feels heavy\" and he wants the oxygen back on. Sats are 97%. Placed oxygen on at 2 lpm nasal cannula. 1420  Assisted to [facility] with 2 assists. Voided with small formed stool. 1500  Walked in hallway with PT and OT, then back to chair. 1600  Reassessment completed. Wife at bedside; denies complaints. Diet changed to cardiac diet per Dr. Carmen Cantrell. Patient to order his own dinner tray. 1800  Vitals stable; remains in chair; now eating dinner. 1900  Bedside and verbal report given to Cynthia Mcdonald RN.

## 2018-04-10 NOTE — PROGRESS NOTES
[facility]  Pulmonary, Critical Care, and Sleep Medicine    Name: Nano Clayton MRN: [id number]   : 1940 Hospital: [facility]   Date: 4/10/2018          IMPRESSION:   · S/p Afib ablation  · Pericardial Effusion  · Acute post op respiratory support  · Hypotension noted earlier now resolved. · Risk of harming himself placed  Order for bilateral wrist restraints. · Prior CVA, TIA  · Hypertension  · Hypothyroidism  · Has been on Chronic Anticoagulation with Xarelto and ASA. · Critically ill, moderate to high risk of decompensation. On mechanical vent support. Had pericardial drain placed. 35 min CC, EOP. Discused with nursing. Discussed with Dr. Alexandra Alvarez.   · Discussed with pts wife Alana Ma at his bedside. RECOMMENDATIONS:   · Vent support for now, Wean and extubate today after ECHO    · Echo this am.  · PUD/DVT prophylaxis. · Sedation with diprivan, target RASS of zero to minus 1.  · Monitor pericardial drain output.  Decreased   · Pain control      Subjective/History:     No acute events overnight  Still on vent support    Current Facility-Administered Medications   Medication Dose Route Frequency    lactated Ringers infusion  25 mL/hr IntraVENous CONTINUOUS    sodium chloride (NS) flush 5-10 mL  5-10 mL IntraVENous Q8H    lactated Ringers infusion  25 mL/hr IntraVENous CONTINUOUS    tamsulosin (FLOMAX) capsule 0.8 mg  0.8 mg Oral DAILY    timolol (TIMOPTIC) 0.5 % ophthalmic solution 1 Drop  1 Drop Both Eyes DAILY    aspirin chewable tablet 81 mg  81 mg Oral DAILY    levothyroxine (SYNTHROID) tablet 75 mcg  75 mcg Oral ACB    latanoprost (XALATAN) 0.005 % ophthalmic solution 1 Drop  1 Drop Both Eyes QHS    0.9% sodium chloride infusion 1,000 mL  1,000 mL IntraVENous CONTINUOUS    sodium chloride (NS) flush 5-10 mL  5-10 mL IntraVENous Q8H    sodium chloride (NS) flush 5-10 mL  5-10 mL IntraVENous Q8H    ketorolac (TORADOL) injection 15 mg  15 mg IntraVENous Q6H    propofol (DIPRIVAN) infusion  5-50 mcg/kg/min IntraVENous TITRATE    dexamethasone (DECADRON) 4 mg/mL injection 4 mg  4 mg IntraVENous Q8H    chlorhexidine (PERIDEX) 0.12 % mouthwash 15 mL  15 mL Oral Q12H    mupirocin (BACTROBAN) 2 % ointment   Both Nostrils Q12H    pantoprazole (PROTONIX) 40 mg in sodium chloride 0.9% 10 mL injection  40 mg IntraVENous DAILY         Review of Systems:  Review of systems not obtained due to patient factors. Objective:   Vital Signs:    Visit Vitals    /74    Pulse 93    Temp 97.5 °F (36.4 °C)    Resp 17    Ht 6' 3\" (1.905 m)    Wt 96.2 kg (212 lb)    SpO2 100%    BMI 26.5 kg/m2       O2 Device: Endotracheal tube       Temp (24hrs), Av.5 °F (36.4 °C), Min:95.3 °F (35.2 °C), Max:98.5 °F (36.9 °C)       Intake/Output:   Last shift:         Last 3 shifts:  190 - 04/10 07  In: 1709.2 [I.V.:1709.2]  Out: 1465 [Urine:835; Drains:580]    Intake/Output Summary (Last 24 hours) at 04/10/18 0738  Last data filed at 04/10/18 0600   Gross per 24 hour   Intake          1709.24 ml   Output             1465 ml   Net           244.24 ml     Hemodynamics:   PAP:   CO:     Wedge:   CI:     CVP:    SVR:       PVR:       Ventilator Settings:  Mode Rate Tidal Volume Pressure FiO2 PEEP   Assist control   500 ml    40 % 6 cm H20     Peak airway pressure: 21 cm H2O    Minute ventilation: 9.02 l/min      Physical Exam:    General:  Intubated, sedated, appears stated age. Head:  Normocephalic, without obvious abnormality, atraumatic. Eyes:  Conjunctivae/corneas clear. PERRL, EOMs intact. Nose: Nares normal. Septum midline. Mucosa normal. No drainage or sinus tenderness. Throat: Lips, mucosa, and tongue normal. Teeth and gums normal.   Neck: Supple, symmetrical, trachea midline, no adenopathy, thyroid: no enlargment/tenderness/nodules, no carotid bruit and no JVD. Back:   Symmetric, no curvature.  ROM normal.   Lungs:   Clear to auscultation bilaterally. Good air movement anteriorly. Chest wall:  No tenderness or deformity. Has a subxiphoid pericardial drain sutured in place. . Has about 100cc of blood effusion. Heart:  Regular rate and rhythm, S1, S2 normal, no murmur, click, rub or gallop. Abdomen:   Soft, non-tender. Bowel sounds normal. No masses,  No organomegaly. Extremities: Extremities normal, atraumatic, no cyanosis or edema. Pulses: 2+ and symmetric all extremities. Skin: Skin color, texture, turgor normal. No rashes or lesions   Lymph nodes: Cervical, supraclavicular, and axillary nodes normal.   Neurologic: Grossly nonfocal, moving all extremities. Psych: no able to assess due to being intubated and sedated. Data:     Recent Results (from the past 24 hour(s))   POC ACTIVATED CLOTTING TIME    Collection Time: 04/09/18  1:52 PM   Result Value Ref Range    Activated Clotting Time (POC) 307 (H) 79 - 138 SECS   POC ACTIVATED CLOTTING TIME    Collection Time: 04/09/18  2:20 PM   Result Value Ref Range    Activated Clotting Time (POC) 340 (H) 79 - 138 SECS   POC ACTIVATED CLOTTING TIME    Collection Time: 04/09/18  3:11 PM   Result Value Ref Range    Activated Clotting Time (POC) 136 79 - 138 SECS   CBC WITH AUTOMATED DIFF    Collection Time: 04/10/18  2:14 AM   Result Value Ref Range    WBC 9.7 4.1 - 11.1 K/uL    RBC 4.36 4.10 - 5.70 M/uL    HGB 12.6 12.1 - 17.0 g/dL    HCT 37.2 36.6 - 50.3 %    MCV 85.3 80.0 - 99.0 FL    MCH 28.9 26.0 - 34.0 PG    MCHC 33.9 30.0 - 36.5 g/dL    RDW 13.5 11.5 - 14.5 %    PLATELET 393 866 - 353 K/uL    MPV 9.8 8.9 - 12.9 FL    NRBC 0.0 0  WBC    ABSOLUTE NRBC 0.00 0.00 - 0.01 K/uL    NEUTROPHILS 89 (H) 32 - 75 %    LYMPHOCYTES 5 (L) 12 - 49 %    MONOCYTES 5 5 - 13 %    EOSINOPHILS 0 0 - 7 %    BASOPHILS 0 0 - 1 %    IMMATURE GRANULOCYTES 1 (H) 0.0 - 0.5 %    ABS. NEUTROPHILS 8.6 (H) 1.8 - 8.0 K/UL    ABS. LYMPHOCYTES 0.5 (L) 0.8 - 3.5 K/UL    ABS. MONOCYTES 0.5 0.0 - 1.0 K/UL    ABS. EOSINOPHILS 0.0 0.0 - 0.4 K/UL    ABS. BASOPHILS 0.0 0.0 - 0.1 K/UL    ABS. IMM. GRANS. 0.1 (H) 0.00 - 0.04 K/UL    DF AUTOMATED      RBC COMMENTS NORMOCYTIC, NORMOCHROMIC     METABOLIC PANEL, COMPREHENSIVE    Collection Time: 04/10/18  2:14 AM   Result Value Ref Range    Sodium 140 136 - 145 mmol/L    Potassium 4.0 3.5 - 5.1 mmol/L    Chloride 108 97 - 108 mmol/L    CO2 23 21 - 32 mmol/L    Anion gap 9 5 - 15 mmol/L    Glucose 133 (H) 65 - 100 mg/dL    BUN 14 6 - 20 MG/DL    Creatinine 0.62 (L) 0.70 - 1.30 MG/DL    BUN/Creatinine ratio 23 (H) 12 - 20      GFR est AA >60 >60 ml/min/1.73m2    GFR est non-AA >60 >60 ml/min/1.73m2    Calcium 7.8 (L) 8.5 - 10.1 MG/DL    Bilirubin, total 0.6 0.2 - 1.0 MG/DL    ALT (SGPT) 25 12 - 78 U/L    AST (SGOT) 49 (H) 15 - 37 U/L    Alk.  phosphatase 76 45 - 117 U/L    Protein, total 6.1 (L) 6.4 - 8.2 g/dL    Albumin 3.4 (L) 3.5 - 5.0 g/dL    Globulin 2.7 2.0 - 4.0 g/dL    A-G Ratio 1.3 1.1 - 2.2     MAGNESIUM    Collection Time: 04/10/18  2:14 AM   Result Value Ref Range    Magnesium 2.1 1.6 - 2.4 mg/dL   PHOSPHORUS    Collection Time: 04/10/18  2:14 AM   Result Value Ref Range    Phosphorus 2.9 2.6 - 4.7 MG/DL   EKG, 12 LEAD, INITIAL    Collection Time: 04/10/18  5:05 AM   Result Value Ref Range    Ventricular Rate 79 BPM    Atrial Rate 79 BPM    P-R Interval 198 ms    QRS Duration 104 ms    Q-T Interval 400 ms    QTC Calculation (Bezet) 458 ms    Calculated P Axis 40 degrees    Calculated R Axis 31 degrees    Calculated T Axis 66 degrees    Diagnosis       Normal sinus rhythm  Early repolarization  Normal ECG  When compared with ECG of 16-MAR-2018 14:35,  ST elevation now present in Lateral leads               Imaging:  I have personally reviewed the patients radiographs and have reviewed the reports:  CXR: sherry Cuellar MD

## 2018-04-10 NOTE — PROGRESS NOTES
Occupational Therapy EVALUATION/discharge  Patient: Willie Kaba (78 y.o. male)  Date: 4/10/2018  Primary Diagnosis: a fib  PVI  A-fib (HCC)  Pericardial tamponade    1 Day Post-Op   Precautions: none       ASSESSMENT:   Based on the objective data described below, the patient presents close to ADL baseline s/p admission for ablation and pericardial tamponade resulting in emergent pericardiocentesis. Patient received on BSC, SBA for sit to stand and to wash hands at sink. VSS on RA with activity, SpO2 98-99%. Pt is completing ADLs with supervision and expect pt to continue to progress. Pt did report intermittent numbness to B hands earlier today, reported resolution. Pt has hx of several TIAs, equal  bilaterally noted. Recommend discharge home, [facility]. Further skilled acute occupational therapy is not indicated at this time. Discharge Recommendations: no OT  Further Equipment Recommendations for Discharge: none      SUBJECTIVE:   Patient stated Is that good?  (referring to SpO2 levels)    OBJECTIVE DATA SUMMARY:   HISTORY:   Past Medical History:   Diagnosis Date    Atrial fibrillation ([address]     CAD (coronary artery disease)     Headache     Hypertension     Hypothyroidism     Stroke [facility])      Past Surgical History:   Procedure Laterality Date    HX APPENDECTOMY  1947    HX CATARACT REMOVAL  2009 x 2    HX THYROIDECTOMY  1950 Partial,    2003 Full       Prior Level of Function/Environment/Context: independent, lives with wife   Occupations in which the patient is/was successful, what are the barriers preventing that success:   Performance Patterns (routines, roles, habits, and rituals):   Personal Interests and/or values:   Expanded or extensive additional review of patient history: TIA x 4    Home Situation  Home Environment: Private residence  # Steps to Enter: 3  Rails to Enter: No  One/Two Story Residence: Two story, live on 1st floor  # of Interior Steps: [address] Rails: Right  Lift Chair Available: No  Living Alone: No  Support Systems: Spouse/Significant Other/Partner  Patient Expects to be Discharged to[de-identified] Private residence  Current DME Used/Available at Home: None  [x]  Right hand dominant   []  Left hand dominant    EXAMINATION OF PERFORMANCE DEFICITS:  Cognitive/Behavioral Status:  Neurologic State: Alert  Orientation Level: Oriented X4  Cognition: Follows commands  Perception: Appears intact  Perseveration: No perseveration noted  Safety/Judgement: Awareness of environment    Skin: intacty    Edema: none noted    Hearing: Auditory  Auditory Impairment: None    Vision/Perceptual:    Tracking: Able to track stimulus in all quadrants w/o difficulty                                Range of Motion:    AROM: Within functional limits                         Strength:    Strength: Generally decreased, functional                Coordination:  Coordination: Within functional limits  Fine Motor Skills-Upper: Left Intact; Right Intact    Gross Motor Skills-Upper: Left Intact; Right Intact    Tone & Sensation:    Tone: Normal  Sensation: Impaired (intermittent numbness to bilateral hands - resolved )                      Balance:  Sitting: Intact  Standing: Impaired  Standing - Static: Good  Standing - Dynamic : Fair    Functional Mobility and Transfers for ADLs:  Bed Mobility:  Rolling: Other (comment) (seated EOB upon arrival )    Transfers:  Sit to Stand: Stand-by assistance  Stand to Sit: Stand-by assistance    ADL Assessment:  Feeding: Independent    Oral Facial Hygiene/Grooming: Stand-by assistance    Bathing: Supervision    Upper Body Dressing: Supervision    Lower Body Dressing: Supervision (don/doff socks cross leg)    Toileting: Supervision                ADL Intervention and task modifications:     Patient instructed and indicated understanding the benefits of maintaining activity tolerance, functional mobility, and independence with self care tasks during acute stay to ensure safe return home and to baseline. Encouraged patient to increase frequency and duration OOB, be out of bed for all meals, perform daily ADLs (as approved by RN/MD regarding bathing etc), and performing functional mobility to/from bathroom. Cognitive Retraining  Safety/Judgement: Awareness of environment      Functional Measure:  Barthel Index:    Bathin  Bladder: 10  Bowels: 10  Groomin  Dressing: 10  Feeding: 10  Mobility: 10  Stairs: 0  Toilet Use: 5  Transfer (Bed to Chair and Back): 10  Total: 70       Barthel and G-code impairment scale:  Percentage of impairment CH  0% CI  1-19% CJ  20-39% CK  40-59% CL  60-79% CM  80-99% CN  100%   Barthel Score 0-100 100 99-80 79-60 59-40 20-39 1-19   0   Barthel Score 0-20 20 17-19 13-16 9-12 5-8 1-4 0      The Barthel ADL Index: Guidelines  1. The index should be used as a record of what a patient does, not as a record of what a patient could do. 2. The main aim is to establish degree of independence from any help, physical or verbal, however minor and for whatever reason. 3. The need for supervision renders the patient not independent. 4. A patient's performance should be established using the best available evidence. Asking the patient, friends/relatives and nurses are the usual sources, but direct observation and common sense are also important. However direct testing is not needed. 5. Usually the patient's performance over the preceding 24-48 hours is important, but occasionally longer periods will be relevant. 6. Middle categories imply that the patient supplies over 50 per cent of the effort. 7. Use of aids to be independent is allowed. Stacey Fields., Barthel, D.W. (7761). Functional evaluation: the Barthel Index. [facility] (14)2. NGUYỄN Diaz, Nj Torrezr., Rosa Lucas, [address] [place] [address] ().  Measuring the change indisability after inpatient rehabilitation; comparison of the responsiveness of the Barthel Index and Functional [place] Measure. Journal of Neurology, Neurosurgery, and Psychiatry, 66(4), 702-399. ARSENIO Costa.A, ONEYDA Rosado, & Gavi Ward M.A. (2004.) Assessment of post-stroke quality of life in cost-effectiveness studies: The usefulness of the Barthel Index and the EuroQoL-5D. Quality of Life Research, 13, 039-21         G codes: In compliance with CMSs Claims Based Outcome Reporting, the following G-code set was chosen for this patient based on their primary functional limitation being treated: The outcome measure chosen to determine the severity of the functional limitation was the barthel with a score of 70/100 which was correlated with the impairment scale. ? Self Care:     - CURRENT STATUS: CJ - 20%-39% impaired, limited or restricted    - GOAL STATUS: CJ - 20%-39% impaired, limited or restricted    - D/C STATUS:  CJ - 20%-39% impaired, limited or restricted     Occupational Therapy Evaluation Charge Determination   History Examination Decision-Making   LOW Complexity : Brief history review  LOW Complexity : 1-3 performance deficits relating to physical, cognitive , or psychosocial skils that result in activity limitations and / or participation restrictions  LOW Complexity : No comorbidities that affect functional and no verbal or physical assistance needed to complete eval tasks       Based on the above components, the patient evaluation is determined to be of the following complexity level: LOW   Pain:  Pain Scale 1: Numeric (0 - 10)  Pain Intensity 1: 0              Activity Tolerance:   VSS  Please refer to the flowsheet for vital signs taken during this treatment.   After treatment:   [x]  Patient left in no apparent distress sitting up in chair  []  Patient left in no apparent distress in bed  [x]  Call bell left within reach  [x]  Nursing notified  []  Caregiver present  []  Bed alarm activated    COMMUNICATION/EDUCATION: Communication/Collaboration:  []      Home safety education was provided and the patient/caregiver indicated understanding. [x]      Patient/family have participated as able and agree with findings and recommendations. []      Patient is unable to participate in plan of care at this time.   Findings and recommendations were discussed with: Physical Therapist and Registered Nurse    Neri Mai OT  Time Calculation: 27 mins

## 2018-04-10 NOTE — PROGRESS NOTES
Pt extubated doing well. Has some laryngeal edema post extubation  Continue decadron for 2 days. Will place orders for transfer to 2nd floor Tele.   Discussed with Dr. Alexandra Alvarez. Nurse, Rt.

## 2018-04-11 ENCOUNTER — APPOINTMENT (OUTPATIENT)
Dept: GENERAL RADIOLOGY | Age: 78
DRG: 274 | End: 2018-04-11
Attending: INTERNAL MEDICINE
Payer: MEDICARE

## 2018-04-11 LAB
ANION GAP SERPL CALC-SCNC: 9 MMOL/L (ref 5–15)
BUN SERPL-MCNC: 17 MG/DL (ref 6–20)
BUN/CREAT SERPL: 24 (ref 12–20)
CALCIUM SERPL-MCNC: 7.6 MG/DL (ref 8.5–10.1)
CHLORIDE SERPL-SCNC: 99 MMOL/L (ref 97–108)
CO2 SERPL-SCNC: 23 MMOL/L (ref 21–32)
CREAT SERPL-MCNC: 0.71 MG/DL (ref 0.7–1.3)
ERYTHROCYTE [DISTWIDTH] IN BLOOD BY AUTOMATED COUNT: 13.4 % (ref 11.5–14.5)
GLUCOSE SERPL-MCNC: 129 MG/DL (ref 65–100)
HCT VFR BLD AUTO: 32.2 % (ref 36.6–50.3)
HGB BLD-MCNC: 11.5 G/DL (ref 12.1–17)
MCH RBC QN AUTO: 29.3 PG (ref 26–34)
MCHC RBC AUTO-ENTMCNC: 35.7 G/DL (ref 30–36.5)
MCV RBC AUTO: 82.1 FL (ref 80–99)
NRBC # BLD: 0 K/UL (ref 0–0.01)
NRBC BLD-RTO: 0 PER 100 WBC
PLATELET # BLD AUTO: 155 K/UL (ref 150–400)
PMV BLD AUTO: 10.2 FL (ref 8.9–12.9)
POTASSIUM SERPL-SCNC: 4.3 MMOL/L (ref 3.5–5.1)
RBC # BLD AUTO: 3.92 M/UL (ref 4.1–5.7)
SODIUM SERPL-SCNC: 131 MMOL/L (ref 136–145)
WBC # BLD AUTO: 15.7 K/UL (ref 4.1–11.1)

## 2018-04-11 PROCEDURE — 74011250637 HC RX REV CODE- 250/637: Performed by: INTERNAL MEDICINE

## 2018-04-11 PROCEDURE — 85027 COMPLETE CBC AUTOMATED: CPT | Performed by: INTERNAL MEDICINE

## 2018-04-11 PROCEDURE — 74011250636 HC RX REV CODE- 250/636: Performed by: INTERNAL MEDICINE

## 2018-04-11 PROCEDURE — 36415 COLL VENOUS BLD VENIPUNCTURE: CPT | Performed by: INTERNAL MEDICINE

## 2018-04-11 PROCEDURE — 97116 GAIT TRAINING THERAPY: CPT

## 2018-04-11 PROCEDURE — 80048 BASIC METABOLIC PNL TOTAL CA: CPT | Performed by: INTERNAL MEDICINE

## 2018-04-11 PROCEDURE — 65660000000 HC RM CCU STEPDOWN

## 2018-04-11 PROCEDURE — 71045 X-RAY EXAM CHEST 1 VIEW: CPT

## 2018-04-11 PROCEDURE — 74011000258 HC RX REV CODE- 258: Performed by: INTERNAL MEDICINE

## 2018-04-11 RX ADMIN — LATANOPROST 1 DROP: 50 SOLUTION OPHTHALMIC at 20:51

## 2018-04-11 RX ADMIN — Medication 10 ML: at 06:27

## 2018-04-11 RX ADMIN — KETOROLAC TROMETHAMINE 15 MG: 30 INJECTION, SOLUTION INTRAMUSCULAR at 06:27

## 2018-04-11 RX ADMIN — DEXAMETHASONE SODIUM PHOSPHATE 4 MG: 4 INJECTION, SOLUTION INTRAMUSCULAR; INTRAVENOUS at 02:46

## 2018-04-11 RX ADMIN — RIVAROXABAN 20 MG: 20 TABLET, FILM COATED ORAL at 18:18

## 2018-04-11 RX ADMIN — Medication 10 ML: at 13:21

## 2018-04-11 RX ADMIN — Medication 10 ML: at 20:52

## 2018-04-11 RX ADMIN — DEXAMETHASONE SODIUM PHOSPHATE 4 MG: 4 INJECTION, SOLUTION INTRAMUSCULAR; INTRAVENOUS at 09:20

## 2018-04-11 RX ADMIN — MUPIROCIN: 20 OINTMENT TOPICAL at 09:19

## 2018-04-11 RX ADMIN — TAMSULOSIN HYDROCHLORIDE 0.8 MG: 0.4 CAPSULE ORAL at 09:19

## 2018-04-11 RX ADMIN — Medication 10 ML: at 13:20

## 2018-04-11 RX ADMIN — TIMOLOL MALEATE 1 DROP: 5 SOLUTION/ DROPS OPHTHALMIC at 09:19

## 2018-04-11 RX ADMIN — MUPIROCIN: 20 OINTMENT TOPICAL at 20:52

## 2018-04-11 RX ADMIN — LEVOTHYROXINE SODIUM 75 MCG: 75 TABLET ORAL at 07:41

## 2018-04-11 RX ADMIN — ASPIRIN 81 MG 81 MG: 81 TABLET ORAL at 09:19

## 2018-04-11 RX ADMIN — SODIUM CHLORIDE 75 ML/HR: 450 INJECTION, SOLUTION INTRAVENOUS at 00:17

## 2018-04-11 NOTE — PROGRESS NOTES
Problem: Falls - Risk of  Goal: *Absence of Falls  Document Siva Fall Risk and appropriate interventions in the flowsheet.    Outcome: Progressing Towards Goal  Fall Risk Interventions:  Mobility Interventions: Patient to call before getting OOB    Mentation Interventions: Adequate sleep, hydration, pain control, Bed/chair exit alarm, Door open when patient unattended    Medication Interventions: Bed/chair exit alarm, Evaluate medications/consider consulting pharmacy, Patient to call before getting OOB, Teach patient to arise slowly    Elimination Interventions: Bed/chair exit alarm, Call light in reach    History of Falls Interventions: Consult care management for discharge planning, Door open when patient unattended, Evaluate medications/consider consulting pharmacy, Investigate reason for fall, Room close to nurse's station

## 2018-04-11 NOTE — PROGRESS NOTES
Care Management:    Reason for Admission:  Ablation for afib          RRAT Score:   19    Do you (patient/family) have any concerns for transition/discharge?    no  Plan for utilizing home health:     No home health needs anticipated at this time. Will follow up with Dr Marino Dawson next week and has a PCP appointment scheduled for April 26th. Likelihood of readmission?     low    Transition of Care Plan: Home with wife transporting and follow up with MD's. Care Management Interventions  PCP Verified by CM: Yes  Mode of Transport at Discharge: Other (see comment) (wife)  Physical Therapy Consult: Yes  Current Support Network: Lives with Spouse (Lives with wife and independent with ADL's. Drives and is active . No DME. )  Confirm Follow Up Transport: Family  Plan discussed with Pt/Family/Caregiver:  Yes     Rafa Iterable Counts include 234 beds at the [facility] acm [id number]

## 2018-04-11 NOTE — PROGRESS NOTES
Attended Interdisciplinary rounds in Critical Care Unit, where patient care was discussed. Visit by: Gonzalez Solano. Jazmín Riley.  Awilda Sun MA, [address]

## 2018-04-11 NOTE — PROGRESS NOTES
0700: Change of shift report received from [facility]. Patient on room air, NSR/sinus tach at this time. No c/o pain; 1/2NS infusing at 75cc/hr. 0740: Patient assisted with BSC with one assist/standby. Patient has a formed bowel movement. Nurse suggests patient walk to sink to wash hands. Patient tolerates walking to sink well. O2 saturation 94-95%, HR . Patient assisted to chair after toileting. 0800: Initial assessment completed; patient AOx4, slight anxiety noted, but overall pleasant. Lung sounds diminished left lung base, fine crackles heard in the right lung case; dressing on left upper chest has noted old bloody drainage - will change. Dressing where pericardiocentesis performed and drain removed is clean, dry, and intact. BL groin sites intact, slight bruising noted on right side. Left upper back bruising with slight open area noted, but patient states not new. Patient states d/t xarelto that he bruises very easily. Trace edema noted BLE. Pedal and post-tibial pulses are palpable. 0504: MD Makenzie Guerrero in with patient; d/c's continuous fluids as patient has adequate PO intake. 1034: Patient returned to bed assisted by nurse    1200: Reassessment performed; no changes noted at this time     1515: Calling report to [facility] on CIU for transfer report. Patient to be moved to room [id number]: TRANSFER - OUT REPORT:    Verbal report given to Shyann Hernandez RN (name) on [facility]  being transferred to [facility] room [phone] (unit) for routine progression of care       Report consisted of patients Situation, Background, Assessment and   Recommendations(SBAR). Information from the following report(s) SBAR, Kardex, ED Summary, Intake/Output, MAR and Recent Results was reviewed with the receiving nurse.     Lines:   Peripheral IV 04/09/18 Left Wrist (Active)   Site Assessment Clean, dry, & intact 4/11/2018 12:00 PM   Phlebitis Assessment 0 4/11/2018 12:00 PM   Infiltration Assessment 0 4/11/2018 12:00 PM Dressing Status Clean, dry, & intact 4/11/2018 12:00 PM   Dressing Type Transparent;Tape 4/11/2018 12:00 PM   Hub Color/Line Status Blue;Capped 4/11/2018 12:00 PM   Action Taken Open ports on tubing capped 4/11/2018 12:00 PM   Alcohol Cap Used No 4/11/2018 12:00 PM       Peripheral IV 04/09/18 Right Forearm (Active)   Site Assessment Clean, dry, & intact 4/11/2018 12:00 PM   Phlebitis Assessment 0 4/11/2018 12:00 PM   Infiltration Assessment 0 4/11/2018 12:00 PM   Dressing Status Clean, dry, & intact 4/11/2018 12:00 PM   Dressing Type Transparent;Tape 4/11/2018 12:00 PM   Hub Color/Line Status Pink;Capped 4/11/2018 12:00 PM   Action Taken Open ports on tubing capped 4/11/2018 12:00 PM   Alcohol Cap Used No 4/11/2018 12:00 PM        Opportunity for questions and clarification was provided.       Patient transported with:   Monitor  Registered Nurse  Tech   Patient placed onto telemetry monitor upon arrival into room

## 2018-04-11 NOTE — PROGRESS NOTES
Problem: Mobility Impaired (Adult and Pediatric)  Goal: *Acute Goals and Plan of Care (Insert Text)  Physical Therapy Goals  Initiated 4/10/2018  1. Patient will move from supine to sit and sit to supine , scoot up and down and roll side to side in bed with independence within 7 day(s). 2.  Patient will transfer from bed to chair and chair to bed with independence using the least restrictive device within 7 day(s). 3.  Patient will perform sit to stand with independence within 7 day(s). 4.  Patient will ambulate with independence for 250 feet with the least restrictive device within 7 day(s). 5.  Patient will ascend/descend 16 stairs with 1 handrail(s) with independence within 7 day(s). physical Therapy TREATMENT  Patient: Dante Seip (49 y.o. male)  Date: 4/11/2018  Diagnosis: a fib  PVI  A-fib (HCC)  Pericardial tamponade <principal problem not specified>    2 Days Post-Op  Precautions:    Chart, physical therapy assessment, plan of care and goals were reviewed. ASSESSMENT:  Pt continues to make excellent gains towards therapy goals and overall mobility and is likely nearing his baseline independent level. However, pt remains generally weak, with impaired activity tolerance/endurance. Pt ambulated 400ft with standby assist only, exhibiting decreased gait speed however steady, stable gait. Balance abilities intact throughout therapy session. Pt c/o increased dizziness during ambulation trial however VSS on RA (BP: 151/76, HR 99 BPM, O2 99% on RA). Due to pt c/o dizziness during ambulation trial, stair climbing not assessed this date. Wife present during therapy session and requesting pt perform stair climbing prior to discharge therefore will assess tomorrow AM. Pt safe to return home w/ assist of wife and no further skilled therapy needs.     Progression toward goals:  [x]    Improving appropriately and progressing toward goals  []    Improving slowly and progressing toward goals  []    Not making progress toward goals and plan of care will be adjusted     PLAN:  Patient continues to benefit from skilled intervention to address the above impairments. Continue treatment per established plan of care. Discharge Recommendations:  None  Further Equipment Recommendations for Discharge:  None     SUBJECTIVE:   Patient stated Did I do okay? Masoudmorelia Buffy    OBJECTIVE DATA SUMMARY:   Critical Behavior:  Neurologic State: Alert  Orientation Level: Oriented X4  Cognition: Appropriate decision making, Appropriate for age attention/concentration, Appropriate safety awareness, Follows commands  Safety/Judgement: Awareness of environment  Functional Mobility Training:  Bed Mobility:                    Transfers:  Sit to Stand: Supervision  Stand to Sit: Supervision                             Balance:  Sitting: Intact  Standing: Intact  Standing - Static: Good  Standing - Dynamic : Good  Ambulation/Gait Training:  Distance (ft): 400 Feet (ft)  Assistive Device: Gait belt  Ambulation - Level of Assistance: Stand-by assistance        Gait Abnormalities: Decreased step clearance        Base of Support: Widened     Speed/Susannah: Pace decreased (<100 feet/min)  Step Length: Left shortened;Right shortened                   Pain:  Pain Scale 1: Numeric (0 - 10)  Pain Intensity 1: 0              Activity Tolerance:   VSS throughout on RA  Please refer to the flowsheet for vital signs taken during this treatment.   After treatment:   [x]    Patient left in no apparent distress sitting up in chair  []    Patient left in no apparent distress in bed  [x]    Call bell left within reach  [x]    Nursing notified  [x]    Caregiver present  []    Bed alarm activated    COMMUNICATION/COLLABORATION:   The patients plan of care was discussed with: Registered Nurse    Serena Siu, PT, DPT   Time Calculation: 15 mins

## 2018-04-11 NOTE — PROGRESS NOTES
[address] [place] [address]  [phone]      4/11/2018 9:11 AM    Admit Date: 4/9/2018    Admit Diagnosis:   a fib  PVI  A-fib ([address]  Pericardial tamponade    Subjective:     Gregg Bui is a pleasant 78year old male s/p AF ablation,  S/P emergent pericardiocentesis. He was extubated yesterday and is feeling well. He notes some \"soreness\" in his chest area but denies pain, pressure, dyspnea, dizziness, weakness, near syncope or syncope. Is up in a chair this morning.       Visit Vitals    /81 (BP 1 Location: Right arm, BP Patient Position: At rest)    Pulse (!) 102    Temp 98.2 °F (36.8 °C)    Resp 18    Ht 6' 3\" (1.905 m)    Wt 216 lb 11.4 oz (98.3 kg)    SpO2 94%    BMI 27.09 kg/m2       Current Facility-Administered Medications   Medication Dose Route Frequency    dexamethasone (DECADRON) 4 mg/mL injection 4 mg  4 mg IntraVENous Q8H    sodium chloride (NS) flush 5-10 mL  5-10 mL IntraVENous Q8H    sodium chloride (NS) flush 5-10 mL  5-10 mL IntraVENous PRN    lidocaine (PF) (XYLOCAINE) 10 mg/mL (1 %) injection 0.1 mL  0.1 mL SubCUTAneous PRN    sodium chloride (NS) flush 5-10 mL  5-10 mL IntraVENous PRN    fentaNYL citrate (PF) injection 25 mcg  25 mcg IntraVENous Multiple    tamsulosin (FLOMAX) capsule 0.8 mg  0.8 mg Oral DAILY    timolol (TIMOPTIC) 0.5 % ophthalmic solution 1 Drop  1 Drop Both Eyes DAILY    aspirin chewable tablet 81 mg  81 mg Oral DAILY    levothyroxine (SYNTHROID) tablet 75 mcg  75 mcg Oral ACB    latanoprost (XALATAN) 0.005 % ophthalmic solution 1 Drop  1 Drop Both Eyes QHS    sodium chloride (NS) flush 5-10 mL  5-10 mL IntraVENous Q8H    sodium chloride (NS) flush 5-10 mL  5-10 mL IntraVENous PRN    acetaminophen (TYLENOL) tablet 650 mg  650 mg Oral Q4H PRN    HYDROcodone-acetaminophen (NORCO) 5-325 mg per tablet 1 Tab  1 Tab Oral Q4H PRN    sodium chloride (NS) flush 5-10 mL  5-10 mL IntraVENous Q8H    sodium chloride (NS) flush 5-10 mL  5-10 mL IntraVENous PRN    naloxone (NARCAN) injection 0.4 mg  0.4 mg IntraVENous PRN    ketorolac (TORADOL) injection 15 mg  15 mg IntraVENous Q6H    mupirocin (BACTROBAN) 2 % ointment   Both Nostrils Q12H    fentaNYL citrate (PF) injection 50 mcg  50 mcg IntraVENous Q4H PRN       Objective:      Physical Exam:  General Appearance:    Chest:   Clear; linq dressing remains intact. Cardiovascular:  Regular rate and rhythm, S1, S2 normal, no murmur. Abdomen:   Soft, non-tender, bowel sounds are active. Extremities:   Skin:  Warm and dry. Data Review:   Recent Labs      04/11/18   0532  04/10/18   0214   WBC  15.7*  9.7   HGB  11.5*  12.6   HCT  32.2*  37.2   PLT  155  164     Recent Labs      04/11/18   0532  04/10/18   0214   NA  131*  140   K  4.3  4.0   CL  99  108   CO2  23  23   GLU  129*  133*   BUN  17  14   CREA  0.71  0.62*   CA  7.6*  7.8*   MG   --   2.1   PHOS   --   2.9   ALB   --   3.4*   TBILI   --   0.6   SGOT   --   49*   ALT   --   25       No results for input(s): TROIQ, CPK, CKMB in the last 72 hours. Intake/Output Summary (Last 24 hours) at 04/11/18 0911  Last data filed at 04/11/18 0800   Gross per 24 hour   Intake          3415.18 ml   Output              350 ml   Net          3065.18 ml        Telemetry: SR  Ventricular rate 97      Assessment:     Active Problems:    Essential hypertension (1/31/2018)      TIA (transient ischemic attack) (2/1/2018)      A-fib ([address] (4/9/2018)      Pericardial tamponade (4/9/2018)        Plan:     James Celeste is a 78 y. o. male with AF ablation (4/9/18) had hypotension post ablation and bedside echo revealed small to moderate posterior pericardial effusion. He is S/P emergent pericardiocentesis. Bps stable. This morning he remains in sinus rhythm. He is extubated, alert and feeling well. Will continue to monitor over the next 24 hours. Intend to restart xarelto in the next 24 hours.        CURTIS Eddy Adri Montejo MD, [facility]    4/11/2018

## 2018-04-11 NOTE — PROGRESS NOTES
Interdisciplinary team rounds were held  4/11/2018  with the following team members:Care Management, Diabetes Treatment Specialist, Nursing, Nutrition, Physician and Respiratory Therapy. Plan of care discussed. Goal: Adjust medications, continue to monitor and support. See MD orders and progress notes for further  interventions and desired outcomes.

## 2018-04-11 NOTE — PROGRESS NOTES
Bedside shift change report given to JOHN Luke (oncoming nurse). Report included the following information SBAR, Kardex, Intake/Output, MAR and Recent Results. SHIFT SUMMARY:            [address] NURSING NOTE   Admission Date 4/9/2018   Admission Diagnosis a fib  PVI  A-fib (HCC)  Pericardial tamponade   Consults None      Cardiac Monitoring [x] Yes [] No      Purposeful Hourly Rounding [x] Yes    Siva Score Total Score: 1   Siva score 3 or > [] Bed Alarm [] Avasys [] 1:1 sitter [] Patient refused (Signed refusal form in chart)   Herrera Score Herrera Score: 19   Herrera score 14 or < [] PMT consult [] Wound Care consult    []  Specialty bed  [] Nutrition consult      Influenza Vaccine Received Flu Vaccine for Current Season (usually Sept-March): Not Flu Season           Oxygen needs? [x] Room air Oxygen @  []1L    []2L    []3L   []4L    []5L   []6L via  NC   Chronic home O2 use?  [] Yes [x] No  Perform O2 challenge test and document in progress note using smartphrase (.Homeoxygen)      Last bowel movement Last Bowel Movement Date: 04/11/18      Urinary Catheter [REMOVED] Urinary Catheter 04/09/18 Taylor-Indications for Use: Strict I/Os, every 1-2 hours     [REMOVED] Urinary Catheter 04/09/18 Taylor-Urine Output (mL): 20 ml (rermoved)     LDAs               Peripheral IV 04/09/18 Left Wrist (Active)   Site Assessment Clean, dry, & intact 4/11/2018  7:39 PM   Phlebitis Assessment 0 4/11/2018  7:39 PM   Infiltration Assessment 0 4/11/2018  7:39 PM   Dressing Status Clean, dry, & intact 4/11/2018  7:39 PM   Dressing Type Transparent 4/11/2018  7:39 PM   Hub Color/Line Status Blue;Capped 4/11/2018  7:39 PM   Action Taken Open ports on tubing capped 4/11/2018 12:00 PM   Alcohol Cap Used No 4/11/2018 12:00 PM       Peripheral IV 04/09/18 Right Forearm (Active)   Site Assessment Clean, dry, & intact 4/11/2018  7:39 PM   Phlebitis Assessment 0 4/11/2018  7:39 PM   Infiltration Assessment 0 4/11/2018  7:39 PM   Dressing Status Clean, dry, & intact 4/11/2018  7:39 PM   Dressing Type Transparent 4/11/2018  7:39 PM   Hub Color/Line Status Pink;Capped 4/11/2018  7:39 PM   Action Taken Open ports on tubing capped 4/11/2018 12:00 PM   Alcohol Cap Used No 4/11/2018 12:00 PM                         Readmission Risk Assessment Tool Score Medium Risk            19       Total Score        2 . Living with Significant Other. Assisted Living. LTAC. SNF. or   Rehab    4 IP Visits Last 12 Months (1-3=4, 4=9, >4=11)    5 Pt.  Coverage (Medicare=5 , Medicaid, or Self-Pay=4)    8 Charlson Comorbidity Score (Age + Comorbid Conditions)        Criteria that do not apply:    Has Seen PCP in Last 6 Months (Yes=3, No=0)    Patient Length of Stay (>5 days = 3)       Expected Length of Stay 2d 7h   Actual Length of Stay 2

## 2018-04-12 VITALS
HEART RATE: 96 BPM | RESPIRATION RATE: 20 BRPM | HEIGHT: 75 IN | OXYGEN SATURATION: 97 % | BODY MASS INDEX: 26.84 KG/M2 | SYSTOLIC BLOOD PRESSURE: 130 MMHG | TEMPERATURE: 97.9 F | DIASTOLIC BLOOD PRESSURE: 77 MMHG | WEIGHT: 215.83 LBS

## 2018-04-12 LAB
ANION GAP SERPL CALC-SCNC: 8 MMOL/L (ref 5–15)
ATRIAL RATE: 94 BPM
BUN SERPL-MCNC: 15 MG/DL (ref 6–20)
BUN/CREAT SERPL: 20 (ref 12–20)
CALCIUM SERPL-MCNC: 7.7 MG/DL (ref 8.5–10.1)
CALCULATED P AXIS, ECG09: 39 DEGREES
CALCULATED R AXIS, ECG10: 2 DEGREES
CALCULATED T AXIS, ECG11: 48 DEGREES
CHLORIDE SERPL-SCNC: 96 MMOL/L (ref 97–108)
CO2 SERPL-SCNC: 23 MMOL/L (ref 21–32)
CREAT SERPL-MCNC: 0.74 MG/DL (ref 0.7–1.3)
DIAGNOSIS, 93000: NORMAL
ERYTHROCYTE [DISTWIDTH] IN BLOOD BY AUTOMATED COUNT: 13.1 % (ref 11.5–14.5)
GLUCOSE SERPL-MCNC: 121 MG/DL (ref 65–100)
HCT VFR BLD AUTO: 30.2 % (ref 36.6–50.3)
HGB BLD-MCNC: 10.7 G/DL (ref 12.1–17)
MCH RBC QN AUTO: 28.9 PG (ref 26–34)
MCHC RBC AUTO-ENTMCNC: 35.4 G/DL (ref 30–36.5)
MCV RBC AUTO: 81.6 FL (ref 80–99)
NRBC # BLD: 0 K/UL (ref 0–0.01)
NRBC BLD-RTO: 0 PER 100 WBC
P-R INTERVAL, ECG05: 188 MS
PLATELET # BLD AUTO: 144 K/UL (ref 150–400)
PMV BLD AUTO: 10 FL (ref 8.9–12.9)
POTASSIUM SERPL-SCNC: 4.1 MMOL/L (ref 3.5–5.1)
Q-T INTERVAL, ECG07: 366 MS
QRS DURATION, ECG06: 104 MS
QTC CALCULATION (BEZET), ECG08: 457 MS
RBC # BLD AUTO: 3.7 M/UL (ref 4.1–5.7)
SODIUM SERPL-SCNC: 127 MMOL/L (ref 136–145)
VENTRICULAR RATE, ECG03: 94 BPM
WBC # BLD AUTO: 12.7 K/UL (ref 4.1–11.1)

## 2018-04-12 PROCEDURE — 85027 COMPLETE CBC AUTOMATED: CPT | Performed by: INTERNAL MEDICINE

## 2018-04-12 PROCEDURE — 97116 GAIT TRAINING THERAPY: CPT | Performed by: PHYSICAL THERAPIST

## 2018-04-12 PROCEDURE — 93005 ELECTROCARDIOGRAM TRACING: CPT

## 2018-04-12 PROCEDURE — 80048 BASIC METABOLIC PNL TOTAL CA: CPT | Performed by: INTERNAL MEDICINE

## 2018-04-12 PROCEDURE — 36415 COLL VENOUS BLD VENIPUNCTURE: CPT | Performed by: INTERNAL MEDICINE

## 2018-04-12 PROCEDURE — 74011250637 HC RX REV CODE- 250/637: Performed by: INTERNAL MEDICINE

## 2018-04-12 RX ADMIN — HYDROCODONE BITARTRATE AND ACETAMINOPHEN 1 TABLET: 5; 325 TABLET ORAL at 01:37

## 2018-04-12 RX ADMIN — Medication 10 ML: at 01:37

## 2018-04-12 RX ADMIN — TAMSULOSIN HYDROCHLORIDE 0.8 MG: 0.4 CAPSULE ORAL at 09:36

## 2018-04-12 RX ADMIN — LEVOTHYROXINE SODIUM 75 MCG: 75 TABLET ORAL at 07:55

## 2018-04-12 RX ADMIN — ASPIRIN 81 MG 81 MG: 81 TABLET ORAL at 09:37

## 2018-04-12 RX ADMIN — TIMOLOL MALEATE 1 DROP: 5 SOLUTION/ DROPS OPHTHALMIC at 09:38

## 2018-04-12 NOTE — DISCHARGE SUMMARY
Cardiology Discharge Summary             [address] [phone]    Patient ID:  Abhijit Samayoa  [id number]  79 y.o.  1940    Admit Date: 4/9/2018    Discharge Date: 4/12/2018     Admitting Physician: Liza Renteria MD     Discharge Physician: KACIE Reyes    Admission Diagnoses: a fib  PVI  A-fib ([address]  Pericardial tamponade    Discharge Diagnoses: Active Problems:    Essential hypertension (1/31/2018)      TIA (transient ischemic attack) (2/1/2018)      A-fib ([address] (4/9/2018)      Pericardial tamponade (4/9/2018)        Discharge Condition: Good    Cardiology Procedures this Admission:  afib ablation, percarialcentesis    Hospital Course: pt s/p atrial fibrillation ablation, implantable loop monitor. Developed tamponade, with s/p pericardiocentesis. He was transferred to ICU and monitored. Repeat echo with resolved effusion. His drain was pulled, dressing changed on loop and his xarelto restarted. His bps remain stable and he is in sinus rhythm. Consults: Pulmonary/Intensive care    Discharge Exam:  Visit Vitals    /77 (BP 1 Location: Left arm, BP Patient Position: At rest)    Pulse 96    Temp 97.9 °F (36.6 °C)    Resp 20    Ht 6' 3\" (1.905 m)    Wt 215 lb 13.3 oz (97.9 kg)    SpO2 97%    BMI 26.98 kg/m2       Abdomen: soft, non-tender  Extremities: extremities normal  Heart: regular rate and rhythm  Lungs: clear to auscultation bilaterally  Neck: no JVD  Neurologic: Grossly normal  Pulses: 2+ and symmetric    Disposition: home    Patient Instructions:   Current Discharge Medication List      CONTINUE these medications which have NOT CHANGED    Details   VIT C/E/ZN/COPPR/LUTEIN/ZEAXAN (PRESERVISION AREDS 2 PO) Take  by mouth two (2) times a day. Associated Diagnoses: Essential hypertension      latanoprost (XALATAN) 0.005 % ophthalmic solution Administer 1 Drop to both eyes nightly.     Associated Diagnoses: Essential hypertension      aspirin 81 mg chewable tablet Take 1 Tab by mouth daily. levothyroxine (SYNTHROID) 75 mcg tablet Take 1 Tab by mouth Daily (before breakfast). losartan (COZAAR) 100 mg tablet Take 100 mg by mouth daily. metoprolol succinate (TOPROL-XL) 25 mg XL tablet Take 25 mg by mouth daily. tamsulosin (FLOMAX) 0.4 mg capsule Take 0.8 mg by mouth daily. timolol (TIMOPTIC) 0.5 % ophthalmic solution Administer 1 Drop to both eyes daily. rivaroxaban (XARELTO) 20 mg tab tablet Take 1 Tab by mouth daily (with dinner). Indications: PREVENT THROMBOEMBOLISM IN CHRONIC ATRIAL FIBRILLATION             Referenced discharge instructions provided by nursing for diet and activity. Follow-up with Dr Marino Dawson in 1-2 weeks. Continue xarelto and toprol. Signed:    KACIE Grady MD, [facility]  4/12/2018  12:03 PM    Pt seen and examined. Agree with assessment and plan as documented above.     Annamaria Michaels MD, [facility]

## 2018-04-12 NOTE — DISCHARGE INSTRUCTIONS
[address] [place] [address]  [phone]        ATRIAL FIBRILLATION ABLATION DISCHARGE INSTRUCTIONS    Patient ID:  Sal Cruz  [id number]  53 y.o.  1940    Admit Date: 4/9/2018    Discharge Date: 4/12/2018     Admitting Physician: Leia Davenport MD     Discharge Physician: Leia Davenport MD    Admission Diagnoses:   a fib  PVI  A-fib ([address]  Pericardial tamponade    Discharge Diagnoses: Active Problems:    Essential hypertension (1/31/2018)      TIA (transient ischemic attack) (2/1/2018)      A-fib ([address] (4/9/2018)      Pericardial tamponade (4/9/2018)        Discharge Condition: Good    Cardiology Procedures this Admission:  Atrial fibrillation. Disposition: home    Reference discharge instructions provided by nursing for diet and activity. Follow-up with Dr Sage Jiménez in one week. Call [phone] to make an appointment. Signed:  KACIE Salinas  4/12/2018  9:43 AM    S/P ATRIAL FIBRILLATION ABLATION DISCHARGE INSTRUCTIONS    It is normal to feel tired the first couple days. Take it easy and follow the physicians instructions. CHECK THE CATHETER INSERTION SITE DAILY:  You may shower 24 hours after the procedure, remove the bandage during showering. Wash with soap and water and pat dry. Gentle cleaning of the site with soap and water is sufficient, cover with a dry clean dressing or bandage. Do not apply creams or powders to the area. Do not sit in a bathtub or pool of water for 7 days or until wound has completely healed. Temporary bruising and discomfort is normal and may last a few weeks. You may have a  formation of a small lump at the site which may last up to 6 weeks. CALL THE PHYSICIAN:  If the site becomes red, swollen or feels warm to the touch  If there is bleeding or drainage or if there is unusual pain at the groin or down the leg.   If there is any bleeding, lie down, apply pressure or have someone apply pressure with a clean cloth until the bleeding stops. If the bleeding continues, call 911 to be transported to the hospital.  DO NOT DRIVE YOURSELF, [address]. Activity:      For the first 24-48 hours or as instructed by the physician:  No lifting, pushing or pulling over 5 pounds and no straining the insertion site. Do not life grocery bags or the garbage can, do not run the vacuum  or  for 7 days. Start with short walks as in the hospital and gradually increase as tolerated each day. It is recommended to walk 30 minutes 5-7 days per week. Follow your physicians instructions on activity. Avoid walking outside in extremes of heat or cold. Walk inside when it is cold and windy or hot and humid. Things to keep in mind:  No driving for at least 5 days or as designated by your physician. Limit the number of times you go up and down the stairs  Take rests and pace yourself with activity. Be careful and do not strain with bowel movements. Medications: Take all medications as prescribed  Call your physician if you have any questions  Keep an updated list of your medications with you at all times and give a list to your physician and pharmacist    Signs and Symptoms:  Be cautious of symptoms of angina or recurrent symptoms such as chest discomfort, unusual shortness of breath or fatigue, palpitations. After Care: Follow up with your physician as instructed. Follow a heart healthy diet with proper portion control, daily stress management, daily exercise, blood pressure and cholesterol control , and smoking cessation. LOOP/LINQ RECORDER DISCHARGE INSTRUCTIONS    1. Carry you ID card for your Loop Recorder with you at all times. This card will be given to you in the hospital or mailed to you. 2. Call for an appointment in 2 weeks [phone]. 3. The Loop Recorder will bulge slightly under your skin.    Please notify the doctor's office if you notice any of the following around your Loop recorder site:    A.  A bruise that does not go away    B. Soreness or yellow, green, or brown drainage from the site. C. Any swelling from the site. D. If you have a fever of 100 degrees or higher that lasts for a few days  4. You will receive instructions on the use of your loop recorder. INCISION CARE       1.  Leave dressing over your site for at least 2 days  2. Leave steri-strips over your site until they start to fall off.   3.   You may shower after as long as your incision isnt submerged or directly sprayed upon until well healed. 4.  For comfort, wear loose fitting clothing. 5.  Ice pack to affected shoulder for first 24 hours. 6.  Report any signs of infection, fever, pain, swelling, redness, oozing, or heat at site especially if these symptoms increase after the first 3 to 4 days. ACTIVITY PRECAUTIONS     1. Avoid rough contact with the implant site. 2. Any extreme activity such as golf, weight lifting or exercise biking should be restricted for 30 days. 3. Do not carry objects by holding them against your implant site. Don't carry a telephone or other transmitting device in a pocket in front of your heart monitor or in a shoulder bag near your heart monitor. SPECIAL PRECAUTIONS     1. You may have an MRI. 2. Advise dentist or other medical personnel you see that you have a  Loop recorder. 3.  Cell phones and microwave oven use is okay. 4.  If you plan to move or take a trip to a new area, the doctor's office will give you a name of a doctor to contact for any problems.

## 2018-04-12 NOTE — PROGRESS NOTES
Problem: Mobility Impaired (Adult and Pediatric)  Goal: *Acute Goals and Plan of Care (Insert Text)  Physical Therapy Goals  Initiated 4/10/2018  1. Patient will move from supine to sit and sit to supine , scoot up and down and roll side to side in bed with independence within 7 day(s). 2.  Patient will transfer from bed to chair and chair to bed with independence using the least restrictive device within 7 day(s). 3.  Patient will perform sit to stand with independence within 7 day(s). 4.  Patient will ambulate with independence for 250 feet with the least restrictive device within 7 day(s). 5.  Patient will ascend/descend 16 stairs with 1 handrail(s) with independence within 7 day(s). physical Therapy TREATMENT  Patient: Rehan Stevenson (67 y.o. male)  Date: 4/12/2018  Diagnosis: a fib  PVI  A-fib (HCC)  Pericardial tamponade <principal problem not specified>    3 Days Post-Op  Chart, physical therapy assessment, plan of care and goals were reviewed. ASSESSMENT: Patient has been discharged from hospital and is returning to home with family. Patient agreeable to stair training prior to discharge. Patient demonstrating independence with transfers and requires close supervision for ambulation. Gait is slightly unsteady demonstrating mild path deviations and occasional balance checks but not overt LOB on level surfaces. Patient safe on stairs with CGA- wife present and educated on assisting patient as needed. [place] Recommend close supervision when ambulating outside home. If patient does not feel endurance and balance are improving recommend follow up with PCP for HHPT     Progression toward goals:  [x]    Improving appropriately and progressing toward goals  []    Improving slowly and progressing toward goals  []    Not making progress toward goals and plan of care will be adjusted     PLAN:  Patient continues to benefit from skilled intervention to address the above impairments.   Continue treatment per established plan of care. Discharge Recommendations:  Recommend follow up with PCP for HHPT if balance and strength do not improve       SUBJECTIVE:   Patient stated I would like to do the stairs before I leave.     OBJECTIVE DATA SUMMARY:   Critical Behavior:  Neurologic State: Alert  Orientation Level: Oriented X4  Cognition: Appropriate decision making  Safety/Judgement: Awareness of environment  Functional Mobility Training:  Bed Mobility:       deferred; patient sitting EOB upon arrival              Transfers:  Sit to Stand: Independent  Stand to Sit: Independent                             Balance:  Sitting: Intact  Standing: Impaired  Standing - Static: Good  Standing - Dynamic : Good  Ambulation/Gait Training:  Distance (ft): 100 Feet (ft)  Assistive Device: Gait belt  Ambulation - Level of Assistance: Stand-by assistance        Gait Abnormalities: Path deviations (mild)        Base of Support: Widened     Speed/Susannah: Pace decreased (<100 feet/min); Slow  Step Length: Left shortened;Right shortened      Gait is slow and slightly unsteady demonstrating mild path deviations and occasional balance checks but no overt LOB noted    Stairs:  Number of Stairs Trained: 12  Stairs - Level of Assistance: Contact guard assistance   Rail Use: Both    Pain:  Pain Scale 1: Numeric (0 - 10)  Pain Intensity 1: 0              Activity Tolerance:   HR=98 and O2 sats =97% on RA post activity  Please refer to the flowsheet for vital signs taken during this treatment.   After treatment:   [x]    Patient left in no apparent distress sitting up in chair  []    Patient left in no apparent distress in bed  [x]    Call bell left within reach  [x]    Nursing notified  []    Caregiver present  []    Bed alarm activated    COMMUNICATION/COLLABORATION:   The patients plan of care was discussed with: Registered Nurse and     Didier Lepe PT   Time Calculation: 10 mins

## 2018-04-12 NOTE — PROGRESS NOTES
Cardiac Electrophysiology Progress Note            [address], [place], [address] [facility]  [phone]    4/12/2018 9:39 AM    Admit Date: 4/9/2018    Admit Diagnosis: a fib  PVI  A-fib (HCC)  Pericardial tamponade    Subjective:     Shena Torres is a pleasant 78year old male s/p AF ablation,  S/P emergent pericardiocentesis. His drain was pulled, dressing changed on his Linq insertion site. He notes some discomfort earlier this morning for which he took pain medication. He denies  pressure, dyspnea, dizziness, weakness, near syncope or syncope.       Visit Vitals    /78 (BP 1 Location: Left arm, BP Patient Position: At rest)    Pulse 89    Temp 98 °F (36.7 °C)    Resp 18    Ht 6' 3\" (1.905 m)    Wt 215 lb 13.3 oz (97.9 kg)    SpO2 96%    BMI 26.98 kg/m2     Current Facility-Administered Medications   Medication Dose Route Frequency    rivaroxaban (XARELTO) tablet 20 mg  20 mg Oral DAILY WITH DINNER    sodium chloride (NS) flush 5-10 mL  5-10 mL IntraVENous Q8H    sodium chloride (NS) flush 5-10 mL  5-10 mL IntraVENous PRN    lidocaine (PF) (XYLOCAINE) 10 mg/mL (1 %) injection 0.1 mL  0.1 mL SubCUTAneous PRN    sodium chloride (NS) flush 5-10 mL  5-10 mL IntraVENous PRN    tamsulosin (FLOMAX) capsule 0.8 mg  0.8 mg Oral DAILY    timolol (TIMOPTIC) 0.5 % ophthalmic solution 1 Drop  1 Drop Both Eyes DAILY    aspirin chewable tablet 81 mg  81 mg Oral DAILY    levothyroxine (SYNTHROID) tablet 75 mcg  75 mcg Oral ACB    latanoprost (XALATAN) 0.005 % ophthalmic solution 1 Drop  1 Drop Both Eyes QHS    sodium chloride (NS) flush 5-10 mL  5-10 mL IntraVENous Q8H    sodium chloride (NS) flush 5-10 mL  5-10 mL IntraVENous PRN    acetaminophen (TYLENOL) tablet 650 mg  650 mg Oral Q4H PRN    HYDROcodone-acetaminophen (NORCO) 5-325 mg per tablet 1 Tab  1 Tab Oral Q4H PRN    sodium chloride (NS) flush 5-10 mL  5-10 mL IntraVENous Q8H    sodium chloride (NS) flush 5-10 mL  5-10 mL IntraVENous PRN    naloxone (NARCAN) injection 0.4 mg  0.4 mg IntraVENous PRN    mupirocin (BACTROBAN) 2 % ointment   Both Nostrils Q12H         Objective:      Visit Vitals    /78 (BP 1 Location: Left arm, BP Patient Position: At rest)    Pulse 89    Temp 98 °F (36.7 °C)    Resp 18    Ht 6' 3\" (1.905 m)    Wt 215 lb 13.3 oz (97.9 kg)    SpO2 96%    BMI 26.98 kg/m2       Physical Exam:  Abdomen: soft, non-tender  Extremities: extremities normal  Heart: regular rate and rhythm, no rub noted  Lungs: clear to auscultation bilaterally  Pulses: 2+ and symmetric    Data Review:   Labs:    Recent Labs      04/12/18   0147  04/11/18   0532  04/10/18   0214   WBC  12.7*  15.7*  9.7   HGB  10.7*  11.5*  12.6   HCT  30.2*  32.2*  37.2   PLT  144*  155  164     Recent Labs      04/12/18 0147 04/11/18 0532  04/10/18   0214   NA  127*  131*  140   K  4.1  4.3  4.0   CL  96*  99  108   CO2  23  23  23   GLU  121*  129*  133*   BUN  15  17  14   CREA  0.74  0.71  0.62*   CA  7.7*  7.6*  7.8*   MG   --    --   2.1   PHOS   --    --   2.9   ALB   --    --   3.4*   TBILI   --    --   0.6   SGOT   --    --   49*   ALT   --    --   25       No results for input(s): TROIQ, CPK, CKMB in the last 72 hours. Intake/Output Summary (Last 24 hours) at 04/12/18 0990  Last data filed at 04/12/18 0312   Gross per 24 hour   Intake             1400 ml   Output              650 ml   Net              750 ml        Telemetry: sinus rhythm ventricular rate 92. Assessment:     Active Problems:    Essential hypertension (1/31/2018)      TIA (transient ischemic attack) (2/1/2018)      A-fib ([address] (4/9/2018)      Pericardial tamponade (4/9/2018)        Plan:     Moshe Hartman is a pleasant 78year old male s/p AF ablation,  S/P emergent pericardiocentesis. he remains in sinus rhythm, bps stable. Will continue [facility] and follow up with him in 2 weeks.      Palak Pelletier MD, [facility]  4/12/2018  9:39 AM

## 2018-04-12 NOTE — PROGRESS NOTES
1500:  DISCHARGE SUMMARY from Nurse    Patient stable for discharge. I have reviewed discharge instructions with the patient and spouse. The patient and spouse verbalized understanding. All questions fully answered. Prescription given to patient and spouse. Telemonitor and PIV removed. No personal belongings, home medications and valuables left at patients bedside//safe. patient and spouse verbalized no complaints. Patient/spouse no compliant voiced. Primary care RN - Marly reeves.

## 2018-04-12 NOTE — PROGRESS NOTES
CM acknowledged discharge summary written, but no orders yet. Updated nursing who will contact MD/NP to determine if discharge orders can be placed. 2:30PM UDPATE  Pt to discharge home by private vehicle with spouse at 3:00PM. Pt to transport self or use family support for follow-up care appointments. Pt has no PT/OT needs at this time, does not qualify for [facility] at this time with active diagnoses. Cardiology f/u appointment scheduled. PCP appointment previously scheduled by pt and family, will keep appointment. All information entered into pt AVS.     Pt has no additional CM needs at this time. Floor nurse notified. Care Management Interventions  PCP Verified by CM: Yes  Palliative Care Criteria Met (RRAT>21 & CHF Dx)?: No  Mode of Transport at Discharge: Other (see comment) (wife)  Hospital Transport Time of Discharge: 1500  Transition of Care Consult (CM Consult): Discharge Planning  Discharge Durable Medical Equipment: No  Health Maintenance Reviewed: Yes  Physical Therapy Consult: Yes  Occupational Therapy Consult: Yes  Speech Therapy Consult: No  Current Support Network: Lives with Spouse (Lives with wife and independent with ADL's. Drives and is active .  No DME. )  Confirm Follow Up Transport: Family  Plan discussed with Pt/Family/Caregiver: Yes  Discharge Location  Discharge Placement: Home with family assistance    ANN [place] Supervisee in Social Work, [employer]  [phone]

## 2018-04-17 ENCOUNTER — PATIENT OUTREACH (OUTPATIENT)
Dept: CARDIOLOGY CLINIC | Age: 78
End: 2018-04-17

## 2018-04-18 NOTE — PROGRESS NOTES
NN Note:    Patient was admitted to [facility] 4/9-4/12 for Afib. Reached out to patient x2. First number had no mailbox and second number mailbox was full. Will continue to follow.

## 2018-04-19 ENCOUNTER — CLINICAL SUPPORT (OUTPATIENT)
Dept: CARDIOLOGY CLINIC | Age: 78
End: 2018-04-19

## 2018-04-19 ENCOUNTER — OFFICE VISIT (OUTPATIENT)
Dept: CARDIOLOGY CLINIC | Age: 78
End: 2018-04-19

## 2018-04-19 VITALS
HEART RATE: 84 BPM | HEIGHT: 75 IN | SYSTOLIC BLOOD PRESSURE: 120 MMHG | RESPIRATION RATE: 16 BRPM | BODY MASS INDEX: 27.55 KG/M2 | DIASTOLIC BLOOD PRESSURE: 80 MMHG | WEIGHT: 221.6 LBS | OXYGEN SATURATION: 97 %

## 2018-04-19 DIAGNOSIS — I48.0 PAROXYSMAL ATRIAL FIBRILLATION (HCC): Primary | Chronic | ICD-10-CM

## 2018-04-19 DIAGNOSIS — I10 ESSENTIAL HYPERTENSION: ICD-10-CM

## 2018-04-19 DIAGNOSIS — G45.8 OTHER SPECIFIED TRANSIENT CEREBRAL ISCHEMIAS: ICD-10-CM

## 2018-04-19 DIAGNOSIS — I48.91 ATRIAL FIBRILLATION, UNSPECIFIED TYPE (HCC): Primary | ICD-10-CM

## 2018-04-19 DIAGNOSIS — Z45.09 ENCOUNTER FOR LOOP RECORDER CHECK: ICD-10-CM

## 2018-04-19 DIAGNOSIS — I50.22 SYSTOLIC CHF, CHRONIC (HCC): Chronic | ICD-10-CM

## 2018-04-19 RX ORDER — BENZONATATE 100 MG/1
100 CAPSULE ORAL
Qty: 30 CAP | Refills: 0 | Status: SHIPPED | OUTPATIENT
Start: 2018-04-19 | End: 2018-04-30

## 2018-04-19 NOTE — PROGRESS NOTES
Chief Complaint   Patient presents with   [facility] Follow Up     Pt c/o coughing and SOB that comes and goes. Pt states he coughs up phelm. 1. Have you been to the ER, urgent care clinic since your last visit? Hospitalized since your last visit? Yes seen at [address] [facility] ED on 13 April 2018 due to bleeding. 2. Have you seen or consulted any other health care providers outside of the [address] [place] [address] since your last visit? Include any pap smears or colon screening. Yes When: PCP hospital follow up on 16 April 2018.

## 2018-04-19 NOTE — PROGRESS NOTES
Subjective:      Wendy Leyva is a 78 y.o. male is here for f/u of his AF. He has a productive cough and sob. Patient Active Problem List    Diagnosis Date Noted    A-fib [facility]) 04/09/2018    Pericardial tamponade 04/09/2018    TIA (transient ischemic attack) 02/01/2018    Broca's aphasia 01/31/2018    Paroxysmal atrial fibrillation ([facility] [address] 01/31/2018    Chronic anticoagulation 11/85/5604    Systolic CHF, chronic ([facility] [address] 01/31/2018    Acquired hypothyroidism 01/31/2018    Essential hypertension 01/31/2018      Lamont Andrew MD  Past Medical History:   Diagnosis Date    Atrial fibrillation [facility])     CAD (coronary artery disease)     Headache     Hypertension     Hypothyroidism     Stroke [facility])       Past Surgical History:   Procedure Laterality Date    CARDIAC SURG PROCEDURE UNLIST  04/09/2018    cardiac ablation, implant permanent cardiac monitor-[employer]    HX APPENDECTOMY  1945    HX CATARACT REMOVAL  2009 x 2    HX THYROIDECTOMY  1950 Partial,    2003 Full     Allergies   Allergen Reactions    Chocolate Flavor Other (comments)      History reviewed. No pertinent family history. negative for cardiac disease  Social History     Social History    Marital status:      Spouse name: N/A    Number of children: N/A    Years of education: N/A     Social History Main Topics    Smoking status: Never Smoker    Smokeless tobacco: Never Used    Alcohol use No    Drug use: No    Sexual activity: Not Asked     Other Topics Concern    None     Social History Narrative     Current Outpatient Prescriptions   Medication Sig    benzonatate (TESSALON) 100 mg capsule Take 1 Cap by mouth three (3) times daily as needed for Cough for up to 7 days.  VIT C/E/ZN/COPPR/LUTEIN/ZEAXAN (PRESERVISION AREDS 2 PO) Take  by mouth two (2) times a day.  latanoprost (XALATAN) 0.005 % ophthalmic solution Administer 1 Drop to both eyes nightly.     aspirin 81 mg chewable tablet Take 1 Tab by mouth daily.    levothyroxine (SYNTHROID) 75 mcg tablet Take 1 Tab by mouth Daily (before breakfast).  rivaroxaban (XARELTO) 20 mg tab tablet Take 1 Tab by mouth daily (with dinner). Indications: PREVENT THROMBOEMBOLISM IN CHRONIC ATRIAL FIBRILLATION    losartan (COZAAR) 100 mg tablet Take 100 mg by mouth daily.  metoprolol succinate (TOPROL-XL) 25 mg XL tablet Take 25 mg by mouth daily.  tamsulosin (FLOMAX) 0.4 mg capsule Take 0.8 mg by mouth daily.  timolol (TIMOPTIC) 0.5 % ophthalmic solution Administer 1 Drop to both eyes daily. No current facility-administered medications for this visit. Vitals:    04/19/18 1126   BP: 120/80   Pulse: 84   Resp: 16   SpO2: 97%   Weight: 221 lb 9.6 oz (100.5 kg)   Height: 6' 3\" (1.905 m)       I have reviewed the nurses notes, vitals, problem list, allergy list, medical history, family, social history and medications. Review of Symptoms:    General: Pt denies excessive weight gain or loss. Pt is able to conduct ADL's  HEENT: Denies blurred vision, headaches, epistaxis and difficulty swallowing. Respiratory: + shortness of breath, DOWNS, denies wheezing or stridor. Cardiovascular: Denies precordial pain, palpitations, edema or PND  Gastrointestinal: Denies poor appetite, indigestion, abdominal pain or blood in stool  Urinary: Denies dysuria, pyuria  Musculoskeletal: Denies pain or swelling from muscles or joints  Neurologic: Denies tremor, paresthesias, or sensory motor disturbance  Skin: Denies rash, itching or texture change. Psych: Denies depression      Physical Exam:      General: Well developed, in no acute distress. HEENT: Eyes - PERRL, no jvd  Heart:  Normal S1/S2 negative S3 or S4. Regular, no murmur, gallop or rub.   Respiratory: Clear bilaterally x 4, no wheezing or rales  Abdomen:   Soft, non-tender, bowel sounds are active.   Extremities:  No edema, normal cap refill, no cyanosis.   Musculoskeletal: No clubbing  Neuro: A&Ox3, speech clear, gait stable. Skin: Skin color is normal. No rashes or lesions. Non diaphoretic  Vascular: 2+ pulses symmetric in all extremities    Cardiographics    Ekg: nsr    ilr - PAF    Results for orders placed or performed during the hospital encounter of 04/09/18   EKG, 12 LEAD, INITIAL   Result Value Ref Range    Ventricular Rate 94 BPM    Atrial Rate 94 BPM    P-R Interval 188 ms    QRS Duration 104 ms    Q-T Interval 366 ms    QTC Calculation (Bezet) 457 ms    Calculated P Axis 39 degrees    Calculated R Axis 2 degrees    Calculated T Axis 48 degrees    Diagnosis       ** Poor data quality, interpretation may be adversely affected  Sinus rhythm with occasional premature ventricular complexes  Nonspecific T wave abnormality    Confirmed by Heaven Galaviz ([id number]) on 4/12/2018 11:08:55 AM           Lab Results   Component Value Date/Time    WBC 12.7 (H) 04/12/2018 01:47 AM    HGB 10.7 (L) 04/12/2018 01:47 AM    HCT 30.2 (L) 04/12/2018 01:47 AM    PLATELET 940 (L) 25/13/7333 01:47 AM    MCV 81.6 04/12/2018 01:47 AM      Lab Results   Component Value Date/Time    Sodium 127 (L) 04/12/2018 01:47 AM    Potassium 4.1 04/12/2018 01:47 AM    Chloride 96 (L) 04/12/2018 01:47 AM    CO2 23 04/12/2018 01:47 AM    Anion gap 8 04/12/2018 01:47 AM    Glucose 121 (H) 04/12/2018 01:47 AM    BUN 15 04/12/2018 01:47 AM    Creatinine 0.74 04/12/2018 01:47 AM    BUN/Creatinine ratio 20 04/12/2018 01:47 AM    GFR est AA >60 04/12/2018 01:47 AM    GFR est non-AA >60 04/12/2018 01:47 AM    Calcium 7.7 (L) 04/12/2018 01:47 AM    Bilirubin, total 0.6 04/10/2018 02:14 AM    AST (SGOT) 49 (H) 04/10/2018 02:14 AM    Alk. phosphatase 76 04/10/2018 02:14 AM    Protein, total 6.1 (L) 04/10/2018 02:14 AM    Albumin 3.4 (L) 04/10/2018 02:14 AM    Globulin 2.7 04/10/2018 02:14 AM    A-G Ratio 1.3 04/10/2018 02:14 AM    ALT (SGPT) 25 04/10/2018 02:14 AM         Assessment:     Assessment:        ICD-10-CM ICD-9-CM    1.  Atrial fibrillation, unspecified type ([facility] [address] I48.91 427.31 AMB POC EKG ROUTINE W/ 12 LEADS, INTER & REP      benzonatate (TESSALON) 100 mg capsule      CBC W/O DIFF   2. Essential hypertension I10 401.9 AMB POC EKG ROUTINE W/ 12 LEADS, INTER & REP      benzonatate (TESSALON) 100 mg capsule      CBC W/O DIFF   3. Other specified transient cerebral ischemias G45.8 435.8 AMB POC EKG ROUTINE W/ 12 LEADS, INTER & REP      benzonatate (TESSALON) 100 mg capsule      CBC W/O DIFF     Orders Placed This Encounter    CBC W/O DIFF    AMB POC EKG ROUTINE W/ 12 LEADS, INTER & REP     Order Specific Question:   Reason for Exam:     Answer:   routine    benzonatate (TESSALON) 100 mg capsule     Sig: Take 1 Cap by mouth three (3) times daily as needed for Cough for up to 7 days. Dispense:  30 Cap     Refill:  0        Plan:   Mr Tariq Mckay is having PAF on bb. He has sob and a cough. Will give him tessalon pearls and check his cbc post tamponade sp AF ablation. He is on xarelto. He will f/u in 4 weeks to reassess his symptoms. Cont med rx for htn and thyroid dz. Continue medical management for htn, tia and af. Thank you for allowing me to participate in Shena Torres 's care.     Gin Ahumada MD, [address]

## 2018-04-20 ENCOUNTER — TELEPHONE (OUTPATIENT)
Dept: CARDIOLOGY CLINIC | Age: 78
End: 2018-04-20

## 2018-04-20 LAB
ERYTHROCYTE [DISTWIDTH] IN BLOOD BY AUTOMATED COUNT: 13.8 % (ref 12.3–15.4)
HCT VFR BLD AUTO: 32.3 % (ref 37.5–51)
HGB BLD-MCNC: 10.8 G/DL (ref 13–17.7)
MCH RBC QN AUTO: 28.1 PG (ref 26.6–33)
MCHC RBC AUTO-ENTMCNC: 33.4 G/DL (ref 31.5–35.7)
MCV RBC AUTO: 84 FL (ref 79–97)
PLATELET # BLD AUTO: 275 X10E3/UL (ref 150–379)
RBC # BLD AUTO: 3.84 X10E6/UL (ref 4.14–5.8)
WBC # BLD AUTO: 8.2 X10E3/UL (ref 3.4–10.8)

## 2018-04-20 NOTE — TELEPHONE ENCOUNTER
----- Message from Nicholas Mitchell MD sent at 4/20/2018  8:58 AM EDT -----  pls let him know that his h/h is stable  ----- Message -----     From: Leon James Lab Results In     Sent: 4/20/2018   8:45 AM       To: Nicholas Mitchell MD

## 2018-04-21 ENCOUNTER — HOSPITAL ENCOUNTER (INPATIENT)
Age: 78
LOS: 9 days | Discharge: HOME HEALTH | DRG: 315 | End: 2018-04-30
Attending: INTERNAL MEDICINE | Admitting: INTERNAL MEDICINE
Payer: MEDICARE

## 2018-04-21 ENCOUNTER — TELEPHONE (OUTPATIENT)
Dept: CARDIOLOGY CLINIC | Age: 78
End: 2018-04-21

## 2018-04-21 PROCEDURE — 65660000000 HC RM CCU STEPDOWN

## 2018-04-21 NOTE — TELEPHONE ENCOUNTER
Brittanie Rascon, this patient wants to see if he can get a limited echo done Tuesday after 1 PM as he is traveling back from [place]. F/u from [facility] ER with weakness, need to rule out recurrent pericardial effusion.   Copy to Dr. Melissa Wilson.

## 2018-04-21 NOTE — IP AVS SNAPSHOT
[address] [facility] 
[phone] Patient: Darius Siemens MRN: [id number] KOX:[birth year] About your hospitalization You were admitted on:  April 21, 2018 You last received care in the:  [facility] 2 CARDIOPULMONARY CARE You were discharged on:  April 30, 2018 Why you were hospitalized Your primary diagnosis was:  Pericardial Tamponade Your diagnoses also included:  Essential Hypertension, Chronic Anticoagulation, Paroxysmal Atrial Fibrillation (Hcc), S/P Ablation Of Atrial Fibrillation, Cardiac Tamponade Follow-up Information Follow up With Details Comments Contact Info Edie Montalvo MD Go on 5/2/2018 Hospital follow-up scheduled at 1:00pm (If you have questions or need to reschedule please call [phone] [address] [phone] Pablo Maurer MD Go on 5/22/2018 For scheduled appointment at 1:45PM  [address] [facility] 
[phone] [address] [place] [address] On 5/2/2018 This is your post-acute home healthcare provider. If you do not hear from them within 24-48 hours, please give them a call. [address] [facility] Levar Crouch [id number] [phone] Your Scheduled Appointments Tuesday May 22, 2018  1:45 PM EDT PROCEDURE with PACEMAKER, [facility]) [address] [facility]  
[phone] Tuesday May 22, 2018  2:00 PM EDT  
ESTABLISHED PATIENT with Pablo Maurer MD  
[facility]) [address] [facility]  
[phone] Discharge Orders None A check maddy indicates which time of day the medication should be taken. My Medications START taking these medications Instructions Each Dose to Equal  
 Morning Noon Evening Bedtime colchicine 0.6 mg tablet Take 1 Tab by mouth daily. 0.6 mg  
    
  
   
   
   
  
 ondansetron 4 mg disintegrating tablet Commonly known as:  ZOFRAN ODT Take 1 Tab by mouth every eight (8) hours as needed for Nausea. 4 mg CONTINUE taking these medications Instructions Each Dose to Equal  
 Morning Noon Evening Bedtime  
 aspirin 81 mg chewable tablet Take 1 Tab by mouth daily. 81 mg  
    
  
   
   
   
  
 FLOMAX 0.4 mg capsule Generic drug:  tamsulosin Take 0.8 mg by mouth daily. 0.8 mg  
    
  
   
   
   
  
 latanoprost 0.005 % ophthalmic solution Commonly known as:  Khushi Finders Administer 1 Drop to both eyes nightly. 1 Drop  
    
   
   
   
  
  
 levothyroxine 75 mcg tablet Commonly known as:  SYNTHROID Take 1 Tab by mouth Daily (before breakfast). 75 mcg  
    
  
   
   
   
  
 losartan 100 mg tablet Commonly known as:  COZAAR Take 100 mg by mouth daily. 100 mg  
    
  
   
   
   
  
 metoprolol succinate 25 mg XL tablet Commonly known as:  TOPROL-XL Take 25 mg by mouth daily. 25 mg PRESERVISION AREDS 2 PO Take  by mouth two (2) times a day. rivaroxaban 20 mg Tab tablet Commonly known as:  Nereida Hernandez Take 1 Tab by mouth daily (with dinner). Indications: PREVENT THROMBOEMBOLISM IN CHRONIC ATRIAL FIBRILLATION  
 20 mg  
    
   
   
  
   
  
 timolol 0.5 % ophthalmic solution Commonly known as:  TIMOPTIC Administer 1 Drop to both eyes daily. 1 Drop STOP taking these medications   
 benzonatate 100 mg capsule Commonly known as:  TESSALON  
   
  
 cephALEXin 250 mg capsule Commonly known as:  Eulas Po Where to Get Your Medications These medications were sent to [facility] [address], [address] [address] Phone:  [phone]  
  colchicine 0.6 mg tablet  
 ondansetron 4 mg disintegrating tablet Discharge Instructions None [employer] Announcement We are excited to announce that we are making your provider's discharge notes available to you in [facility]. You will see these notes when they are completed and signed by the physician that discharged you from your recent hospital stay. If you have any questions or concerns about any information you see in [facility] [employer], please call the Health Information Department where you were seen or reach out to your Primary Care Provider for more information about your plan of care. Introducing [facility] & HE[facility]! Cindy Ortiz introduces [employer] patient portal. Now you can access parts of your medical record, email your doctor's office, and request medication refills online. 1. In your internet browser, go to [email or web address] 2. Click on the First Time User? Click Here link in the Sign In box. You will see the New Member Sign Up page. 3. Enter your [facility] Access Code exactly as it appears below. You will not need to use this code after youve completed the sign-up process. If you do not sign up before the expiration date, you must request a new code. · [employer] Access Code: [email or web address] Expires: 5/1/2018  5:18 PM 
 
4. Enter the last four digits of your Social Security Number (xxxx) and Date of Birth (mm/dd/yyyy) as indicated and click Submit. You will be taken to the next sign-up page. 5. Create a SOLOt ID. This will be your [employer] login ID and cannot be changed, so think of one that is secure and easy to remember. 6. Create a [facility] password. You can change your password at any time. 7. Enter your Password Reset Question and Answer. This can be used at a later time if you forget your password. 8. Enter your e-mail address. You will receive e-mail notification when new information is available in [address] 9. Click Sign Up. You can now view and download portions of your medical record. 10. Click the Download Summary menu link to download a portable copy of your medical information. If you have questions, please visit the Frequently Asked Questions section of the [employer] website. Remember, [facility] is NOT to be used for urgent needs. For medical emergencies, dial 911. Now available from your iPhone and Android! Introducing Tang Clement As a [facility] patient, I wanted to make you aware of our electronic visit tool called Tang Clement. [employer] allows you to connect within minutes with a medical provider 24 hours a day, seven days a week via a mobile device or tablet or logging into a secure website from your computer. You can access Tang Clement from anywhere in the [place]. A virtual visit might be right for you when you have a simple condition and feel like you just dont want to get out of bed, or cant get away from work for an appointment, when your regular [facility] [employer] [facility] provider is not available (evenings, weekends or holidays), or when youre out of town and need minor care. Electronic visits cost only $49 and if the [employer] provider determines a prescription is needed to treat your condition, one can be electronically transmitted to a nearby pharmacy*. Please take a moment to enroll today if you have not already done so. The enrollment process is free and takes just a few minutes. To enroll, please download the [employer] john to your tablet or phone, or visit [email or web address] to enroll on your computer.    
And, as an [address] [place] [address] patient with a [employer] account, the results of your visits will be scanned into your electronic medical record and your primary care provider will be able to view the scanned results. We urge you to continue to see your [facility] provider for your ongoing medical care. And while your primary care provider may not be the one available when you seek a [facility] virtual visit, the peace of mind you get from getting a real diagnosis real time can be priceless. For more information on [facility], view our Frequently Asked Questions (FAQs) at [email or web address]. Sincerely, 
 
Daniella Ace MD 
Chief Medical Officer [address] *:  certain medications cannot be prescribed via [facility] Providers Seen During Your Hospitalization Provider Specialty Primary office phone Sravan Huang MD Cardiology [phone] Annamaria Michaels MD Cardiology [phone] Your Primary Care Physician (PCP) Primary Care Physician Office Phone Office Fax Basiajim Tayloraway [phone] You are allergic to the following Allergen Reactions Chocolate Flavor Other (comments) Recent Documentation Height Weight BMI Smoking Status 1.905 m 95.4 kg 26.29 kg/m2 Never Smoker Emergency Contacts Name Discharge Info Relation Home Work Mobile Mandi Celeste \"Inocencia\" DISCHARGE CAREGIVER [3] Spouse [3] [phone] Patient Belongings The following personal items are in your possession at time of discharge: 
  Dental Appliances: None  Visual Aid: Glasses, With patient      Home Medications: None   Jewelry: None  Clothing: At bedside, Pants, Shirt, Socks, Other (comment)    Other Valuables: None  Personal Items Sent to Safe:  (n/a) Please provide this summary of care documentation to your next provider. Signatures-by signing, you are acknowledging that this After Visit Summary has been reviewed with you and you have received a copy.   
  
 
  
    
    
 Patient Signature: ____________________________________________________________ Date:  ____________________________________________________________  
  
Jelani Eyal Provider Signature:  ____________________________________________________________ Date:  ____________________________________________________________

## 2018-04-21 NOTE — IP AVS SNAPSHOT
[address] [facility] 
[phone] Patient: Jackie Caballero MRN: [id number] CK A check maddy indicates which time of day the medication should be taken. My Medications START taking these medications Instructions Each Dose to Equal  
 Morning Noon Evening Bedtime  
 colchicine 0.6 mg tablet Take 1 Tab by mouth daily. 0.6 mg  
    
  
   
   
   
  
 ondansetron 4 mg disintegrating tablet Commonly known as:  ZOFRAN ODT Take 1 Tab by mouth every eight (8) hours as needed for Nausea. 4 mg CONTINUE taking these medications Instructions Each Dose to Equal  
 Morning Noon Evening Bedtime  
 aspirin 81 mg chewable tablet Take 1 Tab by mouth daily. 81 mg  
    
  
   
   
   
  
 FLOMAX 0.4 mg capsule Generic drug:  tamsulosin Take 0.8 mg by mouth daily. 0.8 mg  
    
  
   
   
   
  
 latanoprost 0.005 % ophthalmic solution Commonly known as:  Ruby Tapia Administer 1 Drop to both eyes nightly. 1 Drop  
    
   
   
   
  
  
 levothyroxine 75 mcg tablet Commonly known as:  SYNTHROID Take 1 Tab by mouth Daily (before breakfast). 75 mcg  
    
  
   
   
   
  
 losartan 100 mg tablet Commonly known as:  COZAAR Take 100 mg by mouth daily. 100 mg  
    
  
   
   
   
  
 metoprolol succinate 25 mg XL tablet Commonly known as:  TOPROL-XL Take 25 mg by mouth daily. 25 mg PRESERVISION AREDS 2 PO Take  by mouth two (2) times a day. rivaroxaban 20 mg Tab tablet Commonly known as:  Magdalena Floyd Take 1 Tab by mouth daily (with dinner). Indications: PREVENT THROMBOEMBOLISM IN CHRONIC ATRIAL FIBRILLATION  
 20 mg  
    
   
   
  
   
  
 timolol 0.5 % ophthalmic solution Commonly known as:  TIMOPTIC  
   
 Administer 1 Drop to both eyes daily. 1 Drop STOP taking these medications   
 benzonatate 100 mg capsule Commonly known as:  TESSALON  
   
  
 cephALEXin 250 mg capsule Commonly known as:  Lyndia Rad Where to Get Your Medications These medications were sent to [address] [place], [address] [address] [address] [facility] [address] Phone:  [phone]  
  colchicine 0.6 mg tablet  
 ondansetron 4 mg disintegrating tablet

## 2018-04-22 ENCOUNTER — APPOINTMENT (OUTPATIENT)
Dept: GENERAL RADIOLOGY | Age: 78
DRG: 315 | End: 2018-04-22
Attending: INTERNAL MEDICINE
Payer: MEDICARE

## 2018-04-22 PROBLEM — I31.4 CARDIAC TAMPONADE: Status: ACTIVE | Noted: 2018-04-22

## 2018-04-22 PROBLEM — Z86.79 S/P ABLATION OF ATRIAL FIBRILLATION: Status: ACTIVE | Noted: 2018-04-22

## 2018-04-22 PROBLEM — Z98.890 S/P ABLATION OF ATRIAL FIBRILLATION: Status: ACTIVE | Noted: 2018-04-22

## 2018-04-22 LAB
ALBUMIN SERPL-MCNC: 2.7 G/DL (ref 3.5–5)
ALBUMIN/GLOB SERPL: 0.9 {RATIO} (ref 1.1–2.2)
ALP SERPL-CCNC: 93 U/L (ref 45–117)
ALT SERPL-CCNC: 157 U/L (ref 12–78)
ANION GAP SERPL CALC-SCNC: 6 MMOL/L (ref 5–15)
AST SERPL-CCNC: 62 U/L (ref 15–37)
BASOPHILS # BLD: 0 K/UL (ref 0–0.1)
BASOPHILS NFR BLD: 0 % (ref 0–1)
BILIRUB SERPL-MCNC: 0.6 MG/DL (ref 0.2–1)
BUN SERPL-MCNC: 22 MG/DL (ref 6–20)
BUN/CREAT SERPL: 23 (ref 12–20)
CALCIUM SERPL-MCNC: 8.1 MG/DL (ref 8.5–10.1)
CHLORIDE SERPL-SCNC: 99 MMOL/L (ref 97–108)
CO2 SERPL-SCNC: 26 MMOL/L (ref 21–32)
CREAT SERPL-MCNC: 0.94 MG/DL (ref 0.7–1.3)
DIFFERENTIAL METHOD BLD: ABNORMAL
EOSINOPHIL # BLD: 0 K/UL (ref 0–0.4)
EOSINOPHIL NFR BLD: 0 % (ref 0–7)
ERYTHROCYTE [DISTWIDTH] IN BLOOD BY AUTOMATED COUNT: 13.5 % (ref 11.5–14.5)
ERYTHROCYTE [DISTWIDTH] IN BLOOD BY AUTOMATED COUNT: 13.7 % (ref 11.5–14.5)
GLOBULIN SER CALC-MCNC: 3.1 G/DL (ref 2–4)
GLUCOSE SERPL-MCNC: 110 MG/DL (ref 65–100)
HCT VFR BLD AUTO: 28.5 % (ref 36.6–50.3)
HCT VFR BLD AUTO: 30.1 % (ref 36.6–50.3)
HGB BLD-MCNC: 10.1 G/DL (ref 12.1–17)
HGB BLD-MCNC: 9.6 G/DL (ref 12.1–17)
IMM GRANULOCYTES # BLD: 0.1 K/UL (ref 0–0.04)
IMM GRANULOCYTES NFR BLD AUTO: 1 % (ref 0–0.5)
LYMPHOCYTES # BLD: 1 K/UL (ref 0.8–3.5)
LYMPHOCYTES NFR BLD: 4 % (ref 12–49)
MCH RBC QN AUTO: 28.7 PG (ref 26–34)
MCH RBC QN AUTO: 28.9 PG (ref 26–34)
MCHC RBC AUTO-ENTMCNC: 33.6 G/DL (ref 30–36.5)
MCHC RBC AUTO-ENTMCNC: 33.7 G/DL (ref 30–36.5)
MCV RBC AUTO: 85.1 FL (ref 80–99)
MCV RBC AUTO: 86 FL (ref 80–99)
MONOCYTES # BLD: 1.2 K/UL (ref 0–1)
MONOCYTES NFR BLD: 5 % (ref 5–13)
NEUTS SEG # BLD: 19.9 K/UL (ref 1.8–8)
NEUTS SEG NFR BLD: 90 % (ref 32–75)
NRBC # BLD: 0 K/UL (ref 0–0.01)
NRBC # BLD: 0 K/UL (ref 0–0.01)
NRBC BLD-RTO: 0 PER 100 WBC
NRBC BLD-RTO: 0 PER 100 WBC
PLATELET # BLD AUTO: 243 K/UL (ref 150–400)
PLATELET # BLD AUTO: 258 K/UL (ref 150–400)
PMV BLD AUTO: 10.1 FL (ref 8.9–12.9)
PMV BLD AUTO: 10.5 FL (ref 8.9–12.9)
POTASSIUM SERPL-SCNC: 4.8 MMOL/L (ref 3.5–5.1)
PROT SERPL-MCNC: 5.8 G/DL (ref 6.4–8.2)
RBC # BLD AUTO: 3.35 M/UL (ref 4.1–5.7)
RBC # BLD AUTO: 3.5 M/UL (ref 4.1–5.7)
SODIUM SERPL-SCNC: 131 MMOL/L (ref 136–145)
WBC # BLD AUTO: 12.4 K/UL (ref 4.1–11.1)
WBC # BLD AUTO: 22.2 K/UL (ref 4.1–11.1)

## 2018-04-22 PROCEDURE — 77030002996 HC SUT SLK J&J -A

## 2018-04-22 PROCEDURE — 87070 CULTURE OTHR SPECIMN AEROBIC: CPT | Performed by: INTERNAL MEDICINE

## 2018-04-22 PROCEDURE — 77030013785 HC KT PERICARDCENT BSC -C

## 2018-04-22 PROCEDURE — 65610000006 HC RM INTENSIVE CARE

## 2018-04-22 PROCEDURE — 85025 COMPLETE CBC W/AUTO DIFF WBC: CPT | Performed by: INTERNAL MEDICINE

## 2018-04-22 PROCEDURE — 74011250637 HC RX REV CODE- 250/637: Performed by: INTERNAL MEDICINE

## 2018-04-22 PROCEDURE — 80053 COMPREHEN METABOLIC PANEL: CPT | Performed by: INTERNAL MEDICINE

## 2018-04-22 PROCEDURE — 74011000250 HC RX REV CODE- 250: Performed by: INTERNAL MEDICINE

## 2018-04-22 PROCEDURE — 77030033269 HC SLV COMPR SCD KNE2 CARD -B

## 2018-04-22 PROCEDURE — 36415 COLL VENOUS BLD VENIPUNCTURE: CPT | Performed by: INTERNAL MEDICINE

## 2018-04-22 PROCEDURE — 74011250636 HC RX REV CODE- 250/636: Performed by: INTERNAL MEDICINE

## 2018-04-22 PROCEDURE — 77030027138 HC INCENT SPIROMETER -A

## 2018-04-22 PROCEDURE — 71045 X-RAY EXAM CHEST 1 VIEW: CPT

## 2018-04-22 PROCEDURE — 93308 TTE F-UP OR LMTD: CPT

## 2018-04-22 PROCEDURE — 0W9D30Z DRAINAGE OF PERICARDIAL CAVITY WITH DRAINAGE DEVICE, PERCUTANEOUS APPROACH: ICD-10-PCS | Performed by: INTERNAL MEDICINE

## 2018-04-22 PROCEDURE — 85027 COMPLETE CBC AUTOMATED: CPT | Performed by: INTERNAL MEDICINE

## 2018-04-22 PROCEDURE — 76937 US GUIDE VASCULAR ACCESS: CPT

## 2018-04-22 PROCEDURE — 74011250636 HC RX REV CODE- 250/636

## 2018-04-22 PROCEDURE — 77030018719 HC DRSG PTCH ANTIMIC J&J -A

## 2018-04-22 PROCEDURE — 74011000250 HC RX REV CODE- 250

## 2018-04-22 RX ORDER — LEVOFLOXACIN 5 MG/ML
750 INJECTION, SOLUTION INTRAVENOUS EVERY 24 HOURS
Status: DISCONTINUED | OUTPATIENT
Start: 2018-04-22 | End: 2018-04-23

## 2018-04-22 RX ORDER — LEVOTHYROXINE SODIUM 75 UG/1
75 TABLET ORAL
Status: DISCONTINUED | OUTPATIENT
Start: 2018-04-22 | End: 2018-04-30 | Stop reason: HOSPADM

## 2018-04-22 RX ORDER — SODIUM CHLORIDE 9 MG/ML
100 INJECTION, SOLUTION INTRAVENOUS CONTINUOUS
Status: DISCONTINUED | OUTPATIENT
Start: 2018-04-22 | End: 2018-04-22

## 2018-04-22 RX ORDER — ONDANSETRON 2 MG/ML
INJECTION INTRAMUSCULAR; INTRAVENOUS
Status: COMPLETED
Start: 2018-04-22 | End: 2018-04-22

## 2018-04-22 RX ORDER — FENTANYL CITRATE 50 UG/ML
INJECTION, SOLUTION INTRAMUSCULAR; INTRAVENOUS
Status: COMPLETED
Start: 2018-04-22 | End: 2018-04-22

## 2018-04-22 RX ORDER — ONDANSETRON 2 MG/ML
4 INJECTION INTRAMUSCULAR; INTRAVENOUS ONCE
Status: COMPLETED | OUTPATIENT
Start: 2018-04-22 | End: 2018-04-22

## 2018-04-22 RX ORDER — LIDOCAINE HYDROCHLORIDE 10 MG/ML
INJECTION, SOLUTION EPIDURAL; INFILTRATION; INTRACAUDAL; PERINEURAL
Status: COMPLETED
Start: 2018-04-22 | End: 2018-04-22

## 2018-04-22 RX ORDER — SODIUM CHLORIDE 0.9 % (FLUSH) 0.9 %
5-10 SYRINGE (ML) INJECTION EVERY 8 HOURS
Status: DISCONTINUED | OUTPATIENT
Start: 2018-04-22 | End: 2018-04-30 | Stop reason: HOSPADM

## 2018-04-22 RX ORDER — LIDOCAINE HYDROCHLORIDE 10 MG/ML
1-30 INJECTION, SOLUTION EPIDURAL; INFILTRATION; INTRACAUDAL; PERINEURAL
Status: DISCONTINUED | OUTPATIENT
Start: 2018-04-22 | End: 2018-04-22

## 2018-04-22 RX ORDER — HYDROCODONE POLISTIREX AND CHLORPHENIRAMINE POLISTIREX 10; 8 MG/5ML; MG/5ML
5 SUSPENSION, EXTENDED RELEASE ORAL
Status: DISCONTINUED | OUTPATIENT
Start: 2018-04-22 | End: 2018-04-30 | Stop reason: HOSPADM

## 2018-04-22 RX ORDER — BENZONATATE 100 MG/1
100 CAPSULE ORAL
Status: DISCONTINUED | OUTPATIENT
Start: 2018-04-22 | End: 2018-04-30 | Stop reason: HOSPADM

## 2018-04-22 RX ORDER — MIDAZOLAM HYDROCHLORIDE 1 MG/ML
INJECTION, SOLUTION INTRAMUSCULAR; INTRAVENOUS
Status: COMPLETED
Start: 2018-04-22 | End: 2018-04-22

## 2018-04-22 RX ORDER — FENTANYL CITRATE 50 UG/ML
25-50 INJECTION, SOLUTION INTRAMUSCULAR; INTRAVENOUS
Status: DISCONTINUED | OUTPATIENT
Start: 2018-04-22 | End: 2018-04-22

## 2018-04-22 RX ORDER — LATANOPROST 50 UG/ML
1 SOLUTION/ DROPS OPHTHALMIC
Status: DISCONTINUED | OUTPATIENT
Start: 2018-04-22 | End: 2018-04-30 | Stop reason: HOSPADM

## 2018-04-22 RX ORDER — HYDROCODONE BITARTRATE AND ACETAMINOPHEN 10; 325 MG/1; MG/1
1 TABLET ORAL
Status: DISCONTINUED | OUTPATIENT
Start: 2018-04-22 | End: 2018-04-30 | Stop reason: HOSPADM

## 2018-04-22 RX ORDER — SODIUM CHLORIDE 0.9 % (FLUSH) 0.9 %
5-10 SYRINGE (ML) INJECTION AS NEEDED
Status: DISCONTINUED | OUTPATIENT
Start: 2018-04-22 | End: 2018-04-30 | Stop reason: HOSPADM

## 2018-04-22 RX ORDER — MUPIROCIN 20 MG/G
OINTMENT TOPICAL 2 TIMES DAILY
Status: COMPLETED | OUTPATIENT
Start: 2018-04-22 | End: 2018-04-26

## 2018-04-22 RX ORDER — TAMSULOSIN HYDROCHLORIDE 0.4 MG/1
0.8 CAPSULE ORAL DAILY
Status: DISCONTINUED | OUTPATIENT
Start: 2018-04-22 | End: 2018-04-30 | Stop reason: HOSPADM

## 2018-04-22 RX ORDER — HEPARIN SODIUM 200 [USP'U]/100ML
500 INJECTION, SOLUTION INTRAVENOUS ONCE
Status: COMPLETED | OUTPATIENT
Start: 2018-04-22 | End: 2018-04-22

## 2018-04-22 RX ORDER — TIMOLOL MALEATE 5 MG/ML
1 SOLUTION/ DROPS OPHTHALMIC DAILY
Status: DISCONTINUED | OUTPATIENT
Start: 2018-04-22 | End: 2018-04-30 | Stop reason: HOSPADM

## 2018-04-22 RX ORDER — MIDAZOLAM HYDROCHLORIDE 1 MG/ML
.5-2 INJECTION, SOLUTION INTRAMUSCULAR; INTRAVENOUS
Status: DISCONTINUED | OUTPATIENT
Start: 2018-04-22 | End: 2018-04-22

## 2018-04-22 RX ORDER — HEPARIN SODIUM 200 [USP'U]/100ML
INJECTION, SOLUTION INTRAVENOUS
Status: COMPLETED
Start: 2018-04-22 | End: 2018-04-22

## 2018-04-22 RX ORDER — METOPROLOL SUCCINATE 25 MG/1
25 TABLET, EXTENDED RELEASE ORAL DAILY
Status: DISCONTINUED | OUTPATIENT
Start: 2018-04-22 | End: 2018-04-30 | Stop reason: HOSPADM

## 2018-04-22 RX ADMIN — HEPARIN SODIUM 1000 UNITS: 200 INJECTION, SOLUTION INTRAVENOUS at 01:51

## 2018-04-22 RX ADMIN — FENTANYL CITRATE 25 MCG: 50 INJECTION, SOLUTION INTRAMUSCULAR; INTRAVENOUS at 01:29

## 2018-04-22 RX ADMIN — MUPIROCIN: 20 OINTMENT TOPICAL at 08:52

## 2018-04-22 RX ADMIN — SODIUM CHLORIDE 100 ML/HR: 900 INJECTION, SOLUTION INTRAVENOUS at 04:54

## 2018-04-22 RX ADMIN — ONDANSETRON 4 MG: 2 INJECTION INTRAMUSCULAR; INTRAVENOUS at 01:48

## 2018-04-22 RX ADMIN — LATANOPROST 1 DROP: 50 SOLUTION OPHTHALMIC at 21:52

## 2018-04-22 RX ADMIN — LEVOFLOXACIN 750 MG: 5 INJECTION, SOLUTION INTRAVENOUS at 08:33

## 2018-04-22 RX ADMIN — Medication 10 ML: at 21:52

## 2018-04-22 RX ADMIN — MIDAZOLAM 1 MG: 1 INJECTION INTRAMUSCULAR; INTRAVENOUS at 01:29

## 2018-04-22 RX ADMIN — TIMOLOL MALEATE 1 DROP: 5 SOLUTION/ DROPS OPHTHALMIC at 10:26

## 2018-04-22 RX ADMIN — SODIUM CHLORIDE 100 ML/HR: 900 INJECTION, SOLUTION INTRAVENOUS at 16:12

## 2018-04-22 RX ADMIN — HYDROCODONE BITARTRATE AND ACETAMINOPHEN 1 TABLET: 10; 325 TABLET ORAL at 17:12

## 2018-04-22 RX ADMIN — HYDROCODONE BITARTRATE AND ACETAMINOPHEN 1 TABLET: 10; 325 TABLET ORAL at 11:21

## 2018-04-22 RX ADMIN — MIDAZOLAM HYDROCHLORIDE 1 MG: 1 INJECTION, SOLUTION INTRAMUSCULAR; INTRAVENOUS at 02:17

## 2018-04-22 RX ADMIN — MUPIROCIN: 20 OINTMENT TOPICAL at 17:12

## 2018-04-22 RX ADMIN — MIDAZOLAM HYDROCHLORIDE 1 MG: 1 INJECTION, SOLUTION INTRAMUSCULAR; INTRAVENOUS at 01:29

## 2018-04-22 RX ADMIN — MUPIROCIN: 20 OINTMENT TOPICAL at 04:55

## 2018-04-22 RX ADMIN — HYDROCODONE BITARTRATE AND ACETAMINOPHEN 1 TABLET: 10; 325 TABLET ORAL at 23:53

## 2018-04-22 RX ADMIN — SODIUM CHLORIDE 500 ML: 900 INJECTION, SOLUTION INTRAVENOUS at 02:14

## 2018-04-22 RX ADMIN — TAMSULOSIN HYDROCHLORIDE 0.8 MG: 0.4 CAPSULE ORAL at 08:33

## 2018-04-22 RX ADMIN — METOPROLOL SUCCINATE 25 MG: 25 TABLET, EXTENDED RELEASE ORAL at 08:32

## 2018-04-22 RX ADMIN — LIDOCAINE HYDROCHLORIDE 20 ML: 10 INJECTION, SOLUTION EPIDURAL; INFILTRATION; INTRACAUDAL; PERINEURAL at 01:52

## 2018-04-22 RX ADMIN — LEVOTHYROXINE SODIUM 75 MCG: 25 TABLET ORAL at 08:33

## 2018-04-22 RX ADMIN — BENZONATATE 100 MG: 100 CAPSULE ORAL at 22:26

## 2018-04-22 RX ADMIN — Medication 10 ML: at 15:16

## 2018-04-22 RX ADMIN — Medication 10 ML: at 08:51

## 2018-04-22 NOTE — PROGRESS NOTES
0700 Bedside and Verbal shift change report received from Kori kwon RN (offgoing nurse). Report included the following information SBAR, Kardex, Intake/Output, MAR, Accordion and Recent Results. 0800 Assessment complete. See flowsheet for details. 1200 Reassessment unchanged. Will continue to monitor.

## 2018-04-22 NOTE — PROGRESS NOTES
1600 Reassessment unchanged, willl continue to monitor. 1900 Bedside and Verbal shift change report given to Yazan Montes RN (oncoming nurse) by Gilberto Lawler RN (offgoing nurse). Report included the following information SBAR, Kardex, Intake/Output, MAR, Accordion and Recent Results.

## 2018-04-22 NOTE — PROGRESS NOTES
Problem: Falls - Risk of  Goal: *Absence of Falls  Document Siva Fall Risk and appropriate interventions in the flowsheet.    Outcome: Progressing Towards Goal  Fall Risk Interventions:  Mobility Interventions: Bed/chair exit alarm         Medication Interventions: Patient to call before getting OOB    Elimination Interventions: Bed/chair exit alarm    History of Falls Interventions: Bed/chair exit alarm

## 2018-04-22 NOTE — PROGRESS NOTES
Pharmacy Automatic Renal Dosing Protocol - Antimicrobials    Indication for Antimicrobials: CAP     Current Regimen of Each Antimicrobial:  Levofloxacin 750mg IV q24h x7 days  (Start Date ; Day # 1 of 7)    Previous Antimicrobial Therapy:      Significant Cultures:   : Sputum = 1+ Epithelial cells; 3+ GPC, few GNR (pending)    Radiology / Imaging results: (X-ray, CT scan or MRI):  : CXR: Congestive changes, left pleural effusion. Paralysis, amputations, malnutrition:     Labs:  Recent Labs      18   0850  18   0800   CREA  0.94  1.24  0.95   BUN  22*  24*  17   WBC  22.2*  13.3*  9.0     Temp (24hrs), Av.1 °F (36.7 °C), Min:97.4 °F (36.3 °C), Max:98.6 °F (37 °C)      Creatinine Clearance (mL/min) or Dialysis: 81 ml/min      Impression/Plan:   · Levofloxacin is dosed appropriately  · Antimicrobial stop date 7 days     Pharmacy will follow daily and adjust medications as appropriate for renal function and/or serum levels. Thank you,  Kathryn Grande [facility]          Recommended duration of therapy  http://[facility]/[place]/[facility]/Pharmacy/Clinical%20Companion/Duration%20of%20ABX%20therapy. docx    Renal Dosing  http://[facility]/[place]/[facility]/Pharmacy/Clinical%20Companion/Renal%20Dosing%[id number]. pdf

## 2018-04-22 NOTE — PROGRESS NOTES
Received patient from [facility] by helicopter where he presented with progressive weakness, shortness of breath, and syncope earlier today after recent AF ablation complicated by tamponade and need for pericardiocentesis. CT there showed large pericardial effusion. Echo her confirms same with + pulsus paradoxicus on exam.  Patient is tachy in 110's with stable BP currently but as low as 70's en route. I discussed the risks and benefits of emergent pericardiocentesis with the patient who expressed understanding and wishes to proceed. Discussed with wife Kayli Lawler as well.

## 2018-04-22 NOTE — PROCEDURES
Procedure:  Echo-guided apical pericardiocentesis in the cath lab with fluoroscopic guidance    Indication:  large pericardial effusion with tamponade    : Wilian    Approach:  Anterior axillary line at the cardiac apex, intercostal    Sedation:  moderate    Complications:  None    Procedure:  After the patient was prepped and draped in sterile fashion and consent was obtained, The maximal apical pericardial space was identified. Lidocaine used for skin and subcutaneous tissues. A standard catheterization access needle was used to access the pericardium. A j-wire was passed into the pericardial space. The standard pericardial drain catheter was placed and suitured into place, left to drain to gravity into a pericardial drain bag. 200 cc's of serosanguinous fluid was drained and sent for studies. A sterile op-site was placed and the patient was transferred to the CCU in stable condition. The patient reported significant improvement in baseline dyspnea and BP, color improved. Repeat 2 D echo revealed only trace posterior effusion.

## 2018-04-22 NOTE — PROGRESS NOTES
TRANSFER - IN REPORT:    Verbal report received from Darren Luu (name) on Moody Ortega  being received from [facility], [facility](unit) for routine progression of care      Report consisted of patients Situation, Background, Assessment and   Recommendations(SBAR). Information from the following report(s) SBAR was reviewed with the receiving nurse. Opportunity for questions and clarification was provided. Assessment completed upon patients arrival to unit and care assumed. 80  Tenzin Colon for admission orders      0017  Primary Nurse Jennyfer Mathis RN and Marcos Davalos RN performed a dual skin assessment on this patient No impairment noted  Herrera score is 23    0045  The patient was bladder scanned  First reading;  45 ml  Second  reading;  46ml  Third  reading;  55 ml  by another RNm, Marcos Davalos     TRANSFER - OUT REPORT:    Verbal report given to CCU RN(name) on Moody Ortega  being transferred to CCU (unit) for routine progression of care       Report consisted of patients Situation, Background, Assessment and   Recommendations(SBAR). Information from the following report(s) SBAR was reviewed with the receiving nurse. Lines:       Opportunity for questions and clarification was provided.

## 2018-04-22 NOTE — H&P
CARDIOLOGY H&P       Date of  Admission: 4/21/2018 11:58 PM     Admission type:Elective    Consult for:  Syncope, large pericardial effusion by CT, suspected tamponade    Consulted by:   [facility] ER MD     Subjective:     Shena Torres is a 77 yo recently underwent ablation and implantation of loop monitor at ED [facility] 5/9/56, this was complicated with pericardial tamponade managed with pericardiocentesis. Pt states he has continued to be fatigued, persistent dyspnea, persistent cough, and recently has had 10lb weight gain. He was seen in ED this AM with possible urine retention but adams cath revealed no retention. Pt states his abdomen has been distended but no pain. He was able to walk out of house after getting home from ED but on walking back into house and soon after sitting down, became nauseated, vomited and blacked out. This happened twice and thus brought back to ED by wife. He denies chest pain but is still having nausea. No fever, cough, diarrhea. CT at [facility] revealed large pericardial effusion. Pt. Was tachy in 110's, intermittent low BP at [facility] as low as 80's and as low as 28'A during helicopter transfer for suspected tamponade. Transferred here where echo here confirms same with + pulsus paradoxicus on exam.  Patient is tachy in 110's with stable BP currently but as low as 70's en route. I discussed the risks and benefits of emergent pericardiocentesis with the patient who expressed understanding and wishes to proceed. Discussed with wife Inocencio December as well.     Patient Active Problem List    Diagnosis Date Noted    S/P ablation of atrial fibrillation 04/22/2018    A-fib ([address] 04/09/2018    Pericardial tamponade 04/09/2018    TIA (transient ischemic attack) 02/01/2018    Broca's aphasia 01/31/2018    Paroxysmal atrial fibrillation ([address] 01/31/2018    Chronic anticoagulation 23/57/3826    Systolic CHF, chronic ([address] 01/31/2018    Acquired hypothyroidism 01/31/2018    Essential hypertension 01/31/2018      Doris Ram MD  Past Medical History:   Diagnosis Date    Atrial fibrillation [facility])     CAD (coronary artery disease)     Headache     Hypertension     Hypothyroidism     Stroke [facility])        Social History     Social History    Marital status:      Spouse name: N/A    Number of children: N/A    Years of education: N/A     Social History Main Topics    Smoking status: Never Smoker    Smokeless tobacco: Never Used    Alcohol use No    Drug use: No    Sexual activity: No     Other Topics Concern    Not on file     Social History Narrative     Allergies   Allergen Reactions    Chocolate Flavor Other (comments)      No family history on file. Current Facility-Administered Medications   Medication Dose Route Frequency    fentaNYL citrate (PF) injection 25-50 mcg  25-50 mcg IntraVENous Multiple    lidocaine (PF) (XYLOCAINE) 10 mg/mL (1 %) injection 1-30 mL  1-30 mL IntraDERMal Multiple    midazolam (VERSED) injection 0.5-2 mg  0.5-2 mg IntraVENous Multiple    heparinized saline 2 units/mL infusion 1,000 Units  500 mL Irrigation ONCE     Facility-Administered Medications Ordered in Other Encounters   Medication Dose Route Frequency    0.9% sodium chloride infusion  100 mL/hr IntraVENous CONTINUOUS         Review of Symptoms:  11 systems reviewed, negative other than as stated in HPI     Subjective:        Visit Vitals    Ht 6' 3\" (1.905 m)    Wt 228 lb 1.6 oz (103.5 kg)    BMI 28.51 kg/m2       Physical:  Gen:  NAD, appears pale and weak, dyspneic on arrival  Heart: regular rhythm, no murmur, gallop or rub, + palpable pulsus paradoxicus  Lungs: clear to auscultation bilaterally, decreased left base  Neck: supple, symmetrical, trachea midline, no adenopathy, thyroid: not enlarged, symmetric, no tenderness/mass/nodules, no carotid bruit and no JVD  Abdomen: soft, non-tender.  Bowel sounds normal. No masses,  no organomegaly  Extremities: extremities normal, atraumatic, no cyanosis or edema, positive femorals without bruits, normal dp pulses  Neurologic: CN, motor and speech grossly normal    Data Review:   Recent Labs      04/21/18 2015 04/21/18   0800  04/19/18   1256   WBC  13.3*  9.0  8.2   HGB  11.2*  10.7*  10.8*   HCT  33.1*  31.5*  32.3*   PLT  265  259  275     Recent Labs      04/21/18 2015 04/21/18   0800   NA  127*  128*   K  4.6  4.5   CL  91*  93*   CO2  22  27   GLU  171*  143*   BUN  24*  17   CREA  1.24  0.95   CA  8.5  8.4*   MG  2.2  2.1   ALB  3.3*  3.1*   TBILI  0.7  0.8   SGOT  77*  112*   ALT  195*  178*   INR  2.1*  3.1*       Recent Labs      04/21/18 2015 04/21/18   0800   TROIQ  <0.04  0.04   CPK  82  72   CKMB  <1.0  <1.0       Lab Results   Component Value Date/Time    Cholesterol, total 141 02/01/2018 05:53 AM    HDL Cholesterol 34 02/01/2018 05:53 AM    LDL, calculated 97.8 02/01/2018 05:53 AM    VLDL, calculated 9.2 02/01/2018 05:53 AM    Triglyceride 46 02/01/2018 05:53 AM    CHOL/HDL Ratio 4.1 02/01/2018 05:53 AM         No intake or output data in the 24 hours ending 04/22/18 0130     Cardiographics    Telemetry: normal sinus rhythm    ECG: normal sinus rhythm    Echocardiogram: as above, tamponade with normal LVEF, effusion trivial post pericardiocentesis    CT at [facility]:  IMPRESSION   impression: Bilateral pleural effusion, LLL infiltrate, pericardial effusion. Possible  gallstones. Gastric distention, sigmoid diverticulosis. Nonobstructing right  renal calculi.      Assessment:       Principal Problem:    Pericardial tamponade (4/9/2018)    Active Problems:    Paroxysmal atrial fibrillation ([address] (1/31/2018)      Chronic anticoagulation (1/31/2018)      Essential hypertension (1/31/2018)      S/P ablation of atrial fibrillation (4/22/2018)         Plan:     Pericardial tamponade:  S/p urgent pericardiocentesis with resolution of effusion, improvement in color and BP post procedure  Repeat CXR ordered    AF s/p ablation of AF:  Remains in NSR  Must hold anticoaguation for a longer period of time now due to tamponade. HTN:  Controlled. Monitor. Hold off on meds unless high with recent syncope, poor PO intake. Cough times 2 weeks:  Cough suppressant- was on tessalon pearles PTA  CXR at [facility]:  IMPRESSION:  1. Left basilar opacity could represent consolidation, pleural effusion and/or  Atelectasis. WBC 13  ? PNA  I spoke with Dr. Galarza Citizen on call who recommended Tussinex and Levofloxacin 750 mg IV for 1 week  I will defer to AM team if broader spectrum needed due to recent hospital stays    Asymptomatic bacteriuria:  Was placed on Keflex at [facility]. Levofloxacin should cover. I will defer to pulm/ CC if this or another Abx is indicated.

## 2018-04-22 NOTE — CONSULTS
PULMONARY/CRITICAL CARE/SLEEP MEDICINE    Physician Consultation Note    Name: Lisa Forrest   : 1940   MRN: [id number]   Date: 2018      Subjective:   Consult Note: 2018   Requesting Physician: Dr. Elizabeth Joseph Dr  Reason for consult: Pneumonia    Medical records and data reviewed. Patient is a 78 y.o. male who was brought to the hospital with syncope. Patient underwent ablation and implantation of a loop monitor on  which was complicated by pericardial tamponade managed with pericardiocentesis. After discharge she developed 2 episodes of syncope, ongoing fatigue and increased abdominal girth and was brought back to the emergency room. CT of the chest revealed large pericardial effusion. He was transferred back to [facility] where clinical examination was compatible with suspected tamponade at. He underwent emergent pericardiocentesis and is being observed in the Intensive Care Unit postprocedure. He has elevated white count but no fever. He has had cough productive of scant amount of discolored expectoration. He reports chest discomfort which is not pleuritic in nature. Chest x-ray shows better pulmonary vascular congestion, mild cardiomegaly and a small left effusion. He is on Levaquin and sputum culture is pending.      Review of Systems:     A comprehensive 12 system review of systems was negative except for as documented in HPI    Assessment:   Cardiac tamponade and with syncope on presentation, status post therapeutic pericardiocentesis  Leukocytosis, possible stress induced versus infection  Abnormal chest x-ray with interstitial edema and small left effusion  History of atrial fibrillation and chronic anticoagulation   Other medical problems per chart    Recommendations:   Cardiac management per cardiology  IV fluids  Abx- empiric Levaquin  Follow sputum culture  Incentive spirometer  SCDs  Pain control, he requests Norco  Monitor WBC count and CXR  D/W RN    Active Problem List:     Problem List  Date Reviewed: 4/19/2018          Codes Class    S/P ablation of atrial fibrillation ICD-10-CM: Z98.890, Z86.79  ICD-9-CM: V45.89         Cardiac tamponade ICD-10-CM: I31.4  ICD-9-CM: 423.3         A-fib (HCC) ICD-10-CM: I48.91  ICD-9-CM: 427.31         * (Principal)Pericardial tamponade ICD-10-CM: I31.4  ICD-9-CM: 423.3         TIA (transient ischemic attack) ICD-10-CM: G45.9  ICD-9-CM: 435.9         Broca's aphasia ICD-10-CM: R47.01  ICD-9-CM: 627. 3         Paroxysmal atrial fibrillation (HCC) (Chronic) ICD-10-CM: I48.0  ICD-9-CM: 427.31         Chronic anticoagulation (Chronic) ICD-10-CM: Z79.01  ICD-9-CM: C73.95         Systolic CHF, chronic (HCC) (Chronic) ICD-10-CM: I50.22  ICD-9-CM: 428.22, 428.0         Acquired hypothyroidism (Chronic) ICD-10-CM: E03.9  ICD-9-CM: 244.9         Essential hypertension (Chronic) ICD-10-CM: I10  ICD-9-CM: 401.9               Past Medical History:      has a past medical history of Atrial fibrillation ([facility] [address] CAD (coronary artery disease); Headache; Hypertension; Hypothyroidism; and Stroke [facility]). Past Surgical History:      has a past surgical history that includes hx appendectomy (1947); hx thyroidectomy (1950 Partial,    2003 Full); hx cataract removal (2009 x 2); pr cardiac surg procedure unlist (04/09/2018); and cath pericardiocentesis (4/22/2018). Home Medications:     Prior to Admission medications    Medication Sig Start Date End Date Taking? Authorizing Provider   VIT C/E/ZN/COPPR/LUTEIN/ZEAXAN (PRESERVISION AREDS 2 PO) Take  by mouth two (2) times a day. Yes Historical Provider   timolol (TIMOPTIC) 0.5 % ophthalmic solution Administer 1 Drop to both eyes daily. Yes Phys Other, MD   cephALEXin (KEFLEX) 250 mg capsule Take 1 Cap by mouth three (3) times daily. 4/21/18   Jyoti Rodriguez MD   benzonatate (TESSALON) 100 mg capsule Take 1 Cap by mouth three (3) times daily as needed for Cough for up to 7 days.  4/19/18 4/26/18  Augusto CARLOS MD Smita   latanoprost (XALATAN) 0.005 % ophthalmic solution Administer 1 Drop to both eyes nightly. 18   Historical Provider   aspirin 81 mg chewable tablet Take 1 Tab by mouth daily. 18   Steven Wallace MD   levothyroxine (SYNTHROID) 75 mcg tablet Take 1 Tab by mouth Daily (before breakfast). 18   Steven Wallace MD   rivaroxaban (XARELTO) 20 mg tab tablet Take 1 Tab by mouth daily (with dinner). Indications: PREVENT THROMBOEMBOLISM IN CHRONIC ATRIAL FIBRILLATION 18   Steven Wallace MD   losartan (COZAAR) 100 mg tablet Take 100 mg by mouth daily. Pat Mar MD   metoprolol succinate (TOPROL-XL) 25 mg XL tablet Take 25 mg by mouth daily. Pat Mar MD   tamsulosin (FLOMAX) 0.4 mg capsule Take 0.8 mg by mouth daily. Pat Mar MD       Allergies/Social/Family History: Allergies   Allergen Reactions    Chocolate Flavor Other (comments)      Social History   Substance Use Topics    Smoking status: Never Smoker    Smokeless tobacco: Never Used    Alcohol use No      No family history on file. Objective:   Vital Signs:  Visit Vitals    /70    Pulse 86    Temp 98.5 °F (36.9 °C)    Resp 18    Ht 6' 3\" (1.905 m)    Wt 103.5 kg (228 lb 1.6 oz)    SpO2 96%    BMI 28.51 kg/m2    O2 Flow Rate (L/min): 2 l/min O2 Device: Nasal cannula Temp (24hrs), Av.1 °F (36.7 °C), Min:97.4 °F (36.3 °C), Max:98.6 °F (37 °C)           Intake/Output:     Intake/Output Summary (Last 24 hours) at 18 1608  Last data filed at 18 1602   Gross per 24 hour   Intake             1050 ml   Output             3230 ml   Net            -2180 ml       Physical Exam:   General:  Alert, cooperative, no distress, appears stated age. Head:  Normocephalic, without obvious abnormality, atraumatic. Eyes:  Conjunctivae/corneas clear. PERRL   Neck: Supple, symmetrical, no adenopathy, no carotid bruit and no JVD. Lungs:   Decreased BS at bases   Chest wall:  No tenderness or deformity. Heart:  Regular rate and rhythm, S1-S2 normal, no murmur, no click, rub or gallop. Abdomen:   Soft, non-tender. Bowel sounds present. No masses,  No organomegaly. Extremities: Atraumatic, no cyanosis or edema. Pulses: Palpable   Skin: No rashes or lesions   Neurologic: Grossly nonfocal         LABS AND  DATA: Personally reviewed  Recent Labs      04/22/18   0850  04/21/18 2015   WBC  22.2*  13.3*   HGB  10.1*  11.2*   HCT  30.1*  33.1*   PLT  243  265     Recent Labs      04/22/18   0850  04/21/18 2015 04/21/18   0800   NA  131*  127*  128*   K  4.8  4.6  4.5   CL  99  91*  93*   CO2  26  22  27   BUN  22*  24*  17   CREA  0.94  1.24  0.95   GLU  110*  171*  143*   CA  8.1*  8.5  8.4*   MG   --   2.2  2.1     Recent Labs      04/22/18   0850  04/21/18 2015   SGOT  62*  77*   AP  93  119*   TP  5.8*  7.0   ALB  2.7*  3.3*   GLOB  3.1  3.7     Recent Labs      04/21/18 2015 04/21/18   0800   INR  2.1*  3.1*   PTP  24.1*  35.1*      No results for input(s): PHI, PCO2I, PO2I, FIO2I in the last 72 hours. Recent Labs      04/21/18 2015 04/21/18   0800   CPK  82  72   CKMB  <1.0  <1.0   TROIQ  <0.04  0.04       MEDS: Reviewed    Chest Imaging: personally reviewed and report checked    Tele- reviewed    Medical decision making:   I have reviewed the flowsheet and previous day's notes  Acute or chronic illness that poses a threat to life or bodily function  Review and order of Clinical lab tests  Review and Order of Radiology tests  Independent visualization of Image  Reviewed high risk medications/sedatives      Thank you for allowing me to participate in this patient's care.     MD Oscar Gonzalez MD, [facility]  [facility]

## 2018-04-22 NOTE — PROGRESS NOTES
8242 Report received from EDGAR Felix RN and DIAZ Durbin RN. Received from cath lab via bed. Awake and alert oriented *3.  Dry hacking type cough, JUANA Felix RN reports same as previous admission. Also appears slightly dyspneic on exertion. Scope SR-St. Low 100's b/p as recorded. Expectorating scanty white colored phlegm. Trace pedal edema bilaterally. Dual skin assessment with ZE Baca RN. Few scattered old scars areas bilateral lower extremities. Bilateral groins with scattered ecchymosis. Left flank pericardial drain intact some trace edema encompassing left breast tissue. Scattered ecchymosis at site. Drain dressing dry and intact with syringe adhered. Drainage bag with dark red/ serous drainage. Abdomen distended and soft . Pt re,ports \"22 stools\" 4/21. Repositioned with assistance. 0330 Dr. Shelby Gomez in. Informed of hx. Of urinary difficulty. IV fluids to continue @ 100 ml/hr  0400 Left flank dressing change per Dr. Shelby Gomez Chlorhexidine disc applied. Syringe removed. Collection output 275 ml of serous drainage. Continues with hacking type cough. 0430 Dozing intermittently. Note no coughing at during rest period. 9508 Voiding *2 mónica to yellow color urine. No c/o pain or discomfort. Pericardial drain with 200 ml of measurable serous output. Scope SR continue with dry cough when awake. 0720 report given to Darrian Noble RN via SBAR and bedside format.

## 2018-04-23 ENCOUNTER — APPOINTMENT (OUTPATIENT)
Dept: GENERAL RADIOLOGY | Age: 78
DRG: 315 | End: 2018-04-23
Attending: INTERNAL MEDICINE
Payer: MEDICARE

## 2018-04-23 LAB
ANION GAP SERPL CALC-SCNC: 3 MMOL/L (ref 5–15)
BASOPHILS # BLD: 0 K/UL (ref 0–0.1)
BASOPHILS NFR BLD: 0 % (ref 0–1)
BUN SERPL-MCNC: 16 MG/DL (ref 6–20)
BUN/CREAT SERPL: 23 (ref 12–20)
CALCIUM SERPL-MCNC: 7.3 MG/DL (ref 8.5–10.1)
CHLORIDE SERPL-SCNC: 106 MMOL/L (ref 97–108)
CO2 SERPL-SCNC: 27 MMOL/L (ref 21–32)
CREAT SERPL-MCNC: 0.69 MG/DL (ref 0.7–1.3)
DIFFERENTIAL METHOD BLD: ABNORMAL
EOSINOPHIL # BLD: 0.1 K/UL (ref 0–0.4)
EOSINOPHIL NFR BLD: 1 % (ref 0–7)
ERYTHROCYTE [DISTWIDTH] IN BLOOD BY AUTOMATED COUNT: 13.7 % (ref 11.5–14.5)
GLUCOSE SERPL-MCNC: 96 MG/DL (ref 65–100)
HCT VFR BLD AUTO: 26.9 % (ref 36.6–50.3)
HCT VFR BLD AUTO: 30.9 % (ref 36.6–50.3)
HGB BLD-MCNC: 10.1 G/DL (ref 12.1–17)
HGB BLD-MCNC: 8.9 G/DL (ref 12.1–17)
IMM GRANULOCYTES # BLD: 0 K/UL (ref 0–0.04)
IMM GRANULOCYTES NFR BLD AUTO: 0 % (ref 0–0.5)
LYMPHOCYTES # BLD: 1 K/UL (ref 0.8–3.5)
LYMPHOCYTES NFR BLD: 11 % (ref 12–49)
MAGNESIUM SERPL-MCNC: 2.1 MG/DL (ref 1.6–2.4)
MCH RBC QN AUTO: 28.3 PG (ref 26–34)
MCHC RBC AUTO-ENTMCNC: 33.1 G/DL (ref 30–36.5)
MCV RBC AUTO: 85.7 FL (ref 80–99)
MONOCYTES # BLD: 0.8 K/UL (ref 0–1)
MONOCYTES NFR BLD: 9 % (ref 5–13)
NEUTS SEG # BLD: 6.8 K/UL (ref 1.8–8)
NEUTS SEG NFR BLD: 78 % (ref 32–75)
NRBC # BLD: 0 K/UL (ref 0–0.01)
NRBC BLD-RTO: 0 PER 100 WBC
PHOSPHATE SERPL-MCNC: 2.1 MG/DL (ref 2.6–4.7)
PLATELET # BLD AUTO: 249 K/UL (ref 150–400)
PMV BLD AUTO: 10.2 FL (ref 8.9–12.9)
POTASSIUM SERPL-SCNC: 4.5 MMOL/L (ref 3.5–5.1)
RBC # BLD AUTO: 3.14 M/UL (ref 4.1–5.7)
SODIUM SERPL-SCNC: 136 MMOL/L (ref 136–145)
WBC # BLD AUTO: 8.7 K/UL (ref 4.1–11.1)

## 2018-04-23 PROCEDURE — 74011250636 HC RX REV CODE- 250/636: Performed by: INTERNAL MEDICINE

## 2018-04-23 PROCEDURE — 74011250637 HC RX REV CODE- 250/637: Performed by: INTERNAL MEDICINE

## 2018-04-23 PROCEDURE — 84100 ASSAY OF PHOSPHORUS: CPT | Performed by: INTERNAL MEDICINE

## 2018-04-23 PROCEDURE — 71045 X-RAY EXAM CHEST 1 VIEW: CPT

## 2018-04-23 PROCEDURE — 83735 ASSAY OF MAGNESIUM: CPT | Performed by: INTERNAL MEDICINE

## 2018-04-23 PROCEDURE — 94640 AIRWAY INHALATION TREATMENT: CPT

## 2018-04-23 PROCEDURE — 85018 HEMOGLOBIN: CPT | Performed by: NURSE PRACTITIONER

## 2018-04-23 PROCEDURE — 93005 ELECTROCARDIOGRAM TRACING: CPT

## 2018-04-23 PROCEDURE — 93308 TTE F-UP OR LMTD: CPT

## 2018-04-23 PROCEDURE — 80048 BASIC METABOLIC PNL TOTAL CA: CPT | Performed by: INTERNAL MEDICINE

## 2018-04-23 PROCEDURE — 65610000006 HC RM INTENSIVE CARE

## 2018-04-23 PROCEDURE — 88112 CYTOPATH CELL ENHANCE TECH: CPT | Performed by: INTERNAL MEDICINE

## 2018-04-23 PROCEDURE — 77010033678 HC OXYGEN DAILY

## 2018-04-23 PROCEDURE — 87205 SMEAR GRAM STAIN: CPT | Performed by: INTERNAL MEDICINE

## 2018-04-23 PROCEDURE — 77030029684 HC NEB SM VOL KT MONA -A

## 2018-04-23 PROCEDURE — 74011000250 HC RX REV CODE- 250: Performed by: INTERNAL MEDICINE

## 2018-04-23 PROCEDURE — 88305 TISSUE EXAM BY PATHOLOGIST: CPT | Performed by: INTERNAL MEDICINE

## 2018-04-23 PROCEDURE — 85025 COMPLETE CBC W/AUTO DIFF WBC: CPT | Performed by: INTERNAL MEDICINE

## 2018-04-23 PROCEDURE — 36415 COLL VENOUS BLD VENIPUNCTURE: CPT | Performed by: INTERNAL MEDICINE

## 2018-04-23 RX ORDER — LEVOFLOXACIN 750 MG/1
750 TABLET ORAL EVERY 24 HOURS
Status: COMPLETED | OUTPATIENT
Start: 2018-04-24 | End: 2018-04-28

## 2018-04-23 RX ORDER — DILTIAZEM HYDROCHLORIDE 5 MG/ML
10 INJECTION INTRAVENOUS ONCE
Status: COMPLETED | OUTPATIENT
Start: 2018-04-24 | End: 2018-04-23

## 2018-04-23 RX ORDER — ALBUTEROL SULFATE 0.83 MG/ML
2.5 SOLUTION RESPIRATORY (INHALATION)
Status: DISCONTINUED | OUTPATIENT
Start: 2018-04-23 | End: 2018-04-24

## 2018-04-23 RX ORDER — METOPROLOL TARTRATE 5 MG/5ML
INJECTION INTRAVENOUS
Status: DISPENSED
Start: 2018-04-23 | End: 2018-04-24

## 2018-04-23 RX ORDER — COLCHICINE 0.6 MG/1
0.6 TABLET ORAL 2 TIMES DAILY
Status: DISCONTINUED | OUTPATIENT
Start: 2018-04-23 | End: 2018-04-30 | Stop reason: HOSPADM

## 2018-04-23 RX ORDER — METOPROLOL TARTRATE 5 MG/5ML
5 INJECTION INTRAVENOUS EVERY 6 HOURS
Status: DISCONTINUED | OUTPATIENT
Start: 2018-04-24 | End: 2018-04-25

## 2018-04-23 RX ORDER — METOPROLOL TARTRATE 5 MG/5ML
5 INJECTION INTRAVENOUS ONCE
Status: COMPLETED | OUTPATIENT
Start: 2018-04-23 | End: 2018-04-23

## 2018-04-23 RX ADMIN — Medication 10 ML: at 21:18

## 2018-04-23 RX ADMIN — TAMSULOSIN HYDROCHLORIDE 0.8 MG: 0.4 CAPSULE ORAL at 08:23

## 2018-04-23 RX ADMIN — ALBUTEROL SULFATE 2.5 MG: 2.5 SOLUTION RESPIRATORY (INHALATION) at 21:33

## 2018-04-23 RX ADMIN — LATANOPROST 1 DROP: 50 SOLUTION OPHTHALMIC at 21:17

## 2018-04-23 RX ADMIN — METOPROLOL SUCCINATE 25 MG: 25 TABLET, EXTENDED RELEASE ORAL at 08:24

## 2018-04-23 RX ADMIN — LEVOTHYROXINE SODIUM 75 MCG: 25 TABLET ORAL at 07:26

## 2018-04-23 RX ADMIN — METHYLPREDNISOLONE SODIUM SUCCINATE 60 MG: 125 INJECTION, POWDER, FOR SOLUTION INTRAMUSCULAR; INTRAVENOUS at 18:40

## 2018-04-23 RX ADMIN — METOROPROLOL TARTRATE 5 MG: 5 INJECTION, SOLUTION INTRAVENOUS at 22:15

## 2018-04-23 RX ADMIN — MUPIROCIN: 20 OINTMENT TOPICAL at 11:16

## 2018-04-23 RX ADMIN — DEXTROSE MONOHYDRATE 5 MG/HR: 50 INJECTION, SOLUTION INTRAVENOUS at 23:40

## 2018-04-23 RX ADMIN — HYDROCODONE BITARTRATE AND ACETAMINOPHEN 1 TABLET: 10; 325 TABLET ORAL at 08:24

## 2018-04-23 RX ADMIN — Medication 10 ML: at 05:25

## 2018-04-23 RX ADMIN — MUPIROCIN: 20 OINTMENT TOPICAL at 18:40

## 2018-04-23 RX ADMIN — TIMOLOL MALEATE 1 DROP: 5 SOLUTION/ DROPS OPHTHALMIC at 11:17

## 2018-04-23 RX ADMIN — Medication 10 ML: at 18:40

## 2018-04-23 RX ADMIN — LEVOFLOXACIN 750 MG: 5 INJECTION, SOLUTION INTRAVENOUS at 07:37

## 2018-04-23 RX ADMIN — COLCHICINE 0.6 MG: 0.6 TABLET, FILM COATED ORAL at 18:44

## 2018-04-23 RX ADMIN — DILTIAZEM HYDROCHLORIDE 10 MG: 5 INJECTION INTRAVENOUS at 23:11

## 2018-04-23 NOTE — PROGRESS NOTES
1900: Bedside and Verbal shift change report given to [id number] [facility] (oncoming nurse) by Kirsty Cevallos RN (offgoing nurse). Report included the following information SBAR, Kardex, ED Summary, Procedure Summary, Intake/Output, MAR, Recent Results, Med Rec Status and Cardiac Rhythm NSR.    2000: Assessment completed, VSS, NSR, 2Ls nasal canula, afebrile. Neuro; patient A/Ox4, follows commands, PERRLA, no c/o pain. Respiratory; Lungs clear, regular breathing pattern, dry, nonproductive cough. Cardiac; NSR, S1 and S2 audible, BP with in defined limits. Pericardial drain draining sanguineous fluid. GI; Active bowel sounds, abdomen distended, semi-soft, intact. Renal; voiding via urinal at bedside. Skin; Pericardial drain at left chest wall, DDI. Lines; PIVx2 infusing 0.9% NaCl at 100 ml/hr. 2011:  Pericardial drain with 200 mL of bright red blood since last drained (at 1900 4/22/18). /67, HR 88 NSR with no ectopy. S1 and S2 heart sounds audible. Patient is c/o feeling \"dizzy and light headed\" however not diaphoretic or pale. Dr. Aleida elkins, orders received to draw a STAT CBC. Will continue to monitor. 2052: Hgb 9.6, /67, HR 87 NSR. Pericardial drain with 150 mL sanguineous fluid with in the last hour. Patient states dizziness and lightheadedness has resolved. Dr. Aleida elkins, orders received to stop maintenance fluids and to have ECHO done in the morning. Will continue to monitor. 2226:  Tessalon 100 mg given for cough. Will continue to monitor. 0000: Reassessment completed, no changes noted. Pericardial drain with 300 ml of sanguineous fluid drained. Norco 1 tab given for 5/10 pain. Will continue to monitor. 0400: Reassessment completed, no changes noted. Pericardial drain with 150 ml of sanguineous fluid drained. Will continue to monitor.

## 2018-04-23 NOTE — PROGRESS NOTES
Saw patient this afternoon after spell of weakness. Per Ashley Manuel RN pt had 600cc of sanguinous drainage today out of pericardial drain. His bps have been stable, remains in sinus rhythm. Had 12 lead EKG and H &H drawn. EKG normal sinus, hemoglobin up from 8.9 to 10.1. Consulted cardiac surgery who recommended prednisone taper and colchicine. Will have stopcock on drainage bag with withdrawal every 4 hours to keep thrombus from forming. Will send off sample of drainage to cytology to rule out malignancy. Discussed with patient and nurse. Pt seen and examined. Agree with assessment and plan as documented above.     Carlene Castellanos MD, Felicia Arita

## 2018-04-23 NOTE — PROGRESS NOTES
Pharmacy Medication Reconciliation     The patient was interviewed regarding current PTA medication list, use and drug allergies;  patient only was present in room and obtained permission from patient to discuss drug regimen with visitor(s) present. The patient was questioned regarding use of any other inhalers, topical products, over the counter medications, herbal medications, vitamin products or ophthalmic/nasal/otic medication use. Allergy Update: chocolate flavor    Recommendations/Findings: The following amendments were made to the patient's active medication list on file at [facility]:   1) Additions: None    2) Deletions: None    3) Changes: None      -Clarified PTA med list with patient interview and Rx query. PTA medication list was corrected to the following:     Prior to Admission Medications   Prescriptions Last Dose Informant Patient Reported? Taking? VIT C/E/ZN/COPPR/LUTEIN/ZEAXAN (PRESERVISION AREDS 2 PO) 4/20/2018 at Unknown time  Yes Yes   Sig: Take  by mouth two (2) times a day. aspirin 81 mg chewable tablet 4/20/18  Yes Yes   Sig: Take 1 Tab by mouth daily. benzonatate (TESSALON) 100 mg capsule 4/21/18  No Yes   Sig: Take 1 Cap by mouth three (3) times daily as needed for Cough for up to 7 days. cephALEXin (KEFLEX) 250 mg capsule 4/21/18  No Yes   Sig: Take 1 Cap by mouth three (3) times daily. latanoprost (XALATAN) 0.005 % ophthalmic solution 4/20/18  Yes Yes   Sig: Administer 1 Drop to both eyes nightly. levothyroxine (SYNTHROID) 75 mcg tablet 4/20/18  Yes Yes   Sig: Take 1 Tab by mouth Daily (before breakfast). losartan (COZAAR) 100 mg tablet 4/20/18  Yes Yes   Sig: Take 100 mg by mouth daily. metoprolol succinate (TOPROL-XL) 25 mg XL tablet 4/20/18  Yes Yes   Sig: Take 25 mg by mouth daily. rivaroxaban (XARELTO) 20 mg tab tablet 4/20/18  Yes Yes   Sig: Take 1 Tab by mouth daily (with dinner).  Indications: PREVENT THROMBOEMBOLISM IN CHRONIC ATRIAL FIBRILLATION   tamsulosin [facility]) 0.4 mg capsule 4/20/18  Yes Yes   Sig: Take 0.8 mg by mouth daily. timolol (TIMOPTIC) 0.5 % ophthalmic solution 4/20/2018 at Unknown time  Yes Yes   Sig: Administer 1 Drop to both eyes daily.       Facility-Administered Medications: None          Thank you,  RACHAEL Lau

## 2018-04-23 NOTE — PROGRESS NOTES
0710 - Bedside and verbal report received from [facility].    1400 - Patient states he feels the urge to have a BM. Assisted patient OOB to bedside commode. 1410 - Patient passing flatus, but no BM. UOP documented. Assisted patient to transfer from bedside commode into chair. 1500 - Patient states that he feels so weak that be believes he may pass out. VS stable. Assisted patient back to bed in a supine position. 2000 [facility] cardiology NP to inform of patient's c/o weakness and large amount of pericardial drain output and its sanguinous color. Orders received. 1930 - Bedside and verbal report given to [facility], RN.

## 2018-04-23 NOTE — PROGRESS NOTES
Care Management:    Reason for Readmission:    Transfer from [facility] with cardiac tamponade and pericardial effusion. Readmit: 4/9 - 4/12 with Afib, pericardial effusion. Discharged home with wife and follow up with MD.           RRAT Score and Risk Level:   22       Level of Readmission:   1       Care Conference scheduled:          Resources/supports as identified by patient/family:   Family and friends. Top Challenges facing patient (as identified by patient/family and CM): Finances/Medication cost?     Has insurance and medication coverage. [employer]. Transportation      Wife or self  Support system or lack thereof? Family and friends. Living arrangements? Lives with wife in a two story home. Independent and active. Self-care/ADL's/Cognition? Independent          Current Advanced Directive/Advance Care Plan:  Full Code           Plan for utilizing home health:   Undetermined at this time. Likelihood of additional readmission:   low             Transition of Care Plan:    Based on readmission, the patient's previous Plan of Care   has been evaluated and/or modified. The current Transition of Care Plan is:    Anticipate home with wife and follow up with PCP and cardiology. Care Management Interventions  PCP Verified by CM: Yes  Mode of Transport at Discharge: Other (see comment) (wife)  Physical Therapy Consult: Yes  Occupational Therapy Consult: Yes  Current Support Network:  (Lives with his wife in a two story home and independent with ADL's He drives and is active at baseline. )  Confirm Follow Up Transport: Family  Plan discussed with Pt/Family/Caregiver:  Yes     Paty Fried [facility] ac [phone]

## 2018-04-23 NOTE — CDMP QUERY
Account Number: [de-identified]  MRN: [id number]  Patient: Cady Pires   Created: [id number]  Clinician Name: Daria Easton RN, CCDS   Dr. Katie Feng :  Please clarify if this patient is (was) being treated/managed for:     => Hypo-osmolality and hyponatremia POA in the setting of pericardial tamponade, resolved  => Other explanation of clinical findings  => Clinically Undetermined (no explanation for clinical findings)    The medical record reflects the following clinical findings, treatment, and risk factors. Risk Factors:  79yo M s/p ablation & implantation of loop monitor p/w pericardial tamponade  Clinical Indicators:  fatigue, persistent dyspnea, syncope, Na level 127 - 131 - 136. Treatment: received 500 IVF bolus, IVF @ 100cc/hr currently discontinued, & lab monitoring. Please clarify and document your clinical opinion in the progress notes and discharge summary including the definitive and/or presumptive diagnosis, (suspected or probable), related to the above clinical findings. Please include clinical findings supporting your diagnosis.   Thank Janette Crandall RN, CCDS

## 2018-04-23 NOTE — PROGRESS NOTES
Cardiac Electrophysiology Progress Note            [address] [facility], [place], [address] [facility]  [phone]    4/23/2018 9:30 AM    Admit Date: 4/21/2018    Admit Diagnosis: Pericardial Effusion  Syncope  Cardiac tamponade    Subjective:     Cortez Abreu is a 79 yo recently underwent ablation and implantation of loop monitor at [facility] 4/2/15, this was complicated with pericardial tamponade managed with pericardiocentesis. Pt states he has continued to be fatigued, persistent dyspnea, persistent cough, and recently has had 10lb weight gain. Experienced syncope on Saturday while at home. Transferred by EMS to [facility]. Was transferred to [facility] via helicopter this weekend. He underwent another pericardiocentesis. He admits to discomfort with deep inhalations but denies chest pain. Had repeat echo at bedside this morning. Visit Vitals    /80    Pulse 88    Temp 97.6 °F (36.4 °C)    Resp 19    Ht 6' 3\" (1.905 m)    Wt 231 lb (104.8 kg)    SpO2 97%    BMI 28.87 kg/m2     Current Facility-Administered Medications   Medication Dose Route Frequency    mupirocin (BACTROBAN) 2 % ointment   Both Nostrils BID    benzonatate (TESSALON) capsule 100 mg  100 mg Oral TID PRN    latanoprost (XALATAN) 0.005 % ophthalmic solution 1 Drop  1 Drop Both Eyes QHS    levothyroxine (SYNTHROID) tablet 75 mcg  75 mcg Oral ACB    metoprolol succinate (TOPROL-XL) XL tablet 25 mg  25 mg Oral DAILY    tamsulosin (FLOMAX) capsule 0.8 mg  0.8 mg Oral DAILY    timolol (TIMOPTIC) 0.5 % ophthalmic solution 1 Drop  1 Drop Both Eyes DAILY    . PHARMACY TO SUBSTITUTE PER PROTOCOL    Per Protocol    sodium chloride (NS) flush 5-10 mL  5-10 mL IntraVENous Q8H    sodium chloride (NS) flush 5-10 mL  5-10 mL IntraVENous PRN    acetaminophen (TYLENOL) solution 650 mg  650 mg Oral Q4H PRN    chlorpheniramine-HYDROcodone (TUSSIONEX) oral suspension 5 mL  5 mL Oral Q12H PRN    levoFLOXacin (LEVAQUIN) 750 mg in D5W IVPB  750 mg IntraVENous Q24H    HYDROcodone-acetaminophen (NORCO)  mg tablet 1 Tab  1 Tab Oral Q4H PRN         Objective:      Visit Vitals    /80    Pulse 88    Temp 97.6 °F (36.4 °C)    Resp 19    Ht 6' 3\" (1.905 m)    Wt 231 lb (104.8 kg)    SpO2 97%    BMI 28.87 kg/m2       Physical Exam:  Abdomen: soft, non-tender  Extremities: extremities normal  Heart: regular rate and rhythm  Lungs: clear to auscultation bilaterally  Pulses: 2+ and symmetric    Data Review:   Labs:    Recent Labs      04/23/18 0418 04/22/18 2026 04/22/18   0850   WBC  8.7  12.4*  22.2*   HGB  8.9*  9.6*  10.1*   HCT  26.9*  28.5*  30.1*   PLT  249  258  243     Recent Labs      04/23/18 0418 04/22/18   0850  04/21/18 2015 04/21/18   0800   NA  136  131*  127*  128*   K  4.5  4.8  4.6  4.5   CL  106  99  91*  93*   CO2  27  26  22  27   GLU  96  110*  171*  143*   BUN  16  22*  24*  17   CREA  0.69*  0.94  1.24  0.95   CA  7.3*  8.1*  8.5  8.4*   MG  2.1   --   2.2  2.1   PHOS  2.1*   --    --    --    ALB   --   2.7*  3.3*  3.1*   TBILI   --   0.6  0.7  0.8   SGOT   --   62*  77*  112*   ALT   --   157*  195*  178*   INR   --    --   2.1*  3.1*       Recent Labs      04/21/18 2015 04/21/18   0800   TROIQ  <0.04  0.04   CPK  82  72   CKMB  <1.0  <1.0         Intake/Output Summary (Last 24 hours) at 04/23/18 0935  Last data filed at 04/23/18 0800   Gross per 24 hour   Intake             1550 ml   Output             4340 ml   Net            -2790 ml        Telemetry: sinus tachycardia, ventricular rate 100.      Assessment:     Principal Problem:    Pericardial tamponade (4/9/2018)    Active Problems:    Paroxysmal atrial fibrillation ([address] (1/31/2018)      Chronic anticoagulation (1/31/2018)      Essential hypertension (1/31/2018)      S/P ablation of atrial fibrillation (4/22/2018)      Cardiac tamponade (4/22/2018)        Plan:     Shena Torres is a 79 yo recently underwent ablation and implantation of loop monitor at [facility] 3/5/34, this was complicated with pericardial tamponade managed with pericardiocentesis. Had urgent repeat pericardiocentesis and his [address] was stopped. Awaiting echo results from this morning. He remains in sinus rhythm. Hold anticoaguation for a longer period of time now due to tamponade. Will continue to follow. Basia Lau NP.      Gin Ahumada MD, [facility]  4/23/2018  9:30 AM

## 2018-04-23 NOTE — INTERDISCIPLINARY ROUNDS
Interdisciplinary team rounds were held 4/23/2018 with the following team members:Care Management, Diabetes Treatment Specialist, Nursing, Nutrition, Pharmacy, Physical Therapy, Physician and Respiratory Therapy. Plan of care discussed. See clinical pathway and/or care plan for interventions and desired outcomes.

## 2018-04-23 NOTE — PROGRESS NOTES
PULMONARY/CRITICAL CARE/SLEEP MEDICINE    Physician Consultation Note    Name: Gregg Bui   : 1940   MRN: [id number]   Date: 2018      Subjective:   Consult Note: 2018   Requesting Physician: Dr. Jazmyn Garcia  Reason for consult: Pneumonia    Last 24 hrs: Pt has mild chest pain at site of pericardial drain. NO coughing, no back pain. No abdominal.   Did not have any dizziness. Rest of ROS is negative of 10 systems except as above. Medical records and data reviewed. Patient is a 78 y.o. male who was brought to the hospital with syncope. Patient underwent ablation and implantation of a loop monitor on  which was complicated by pericardial tamponade managed with pericardiocentesis. After discharge she developed 2 episodes of syncope, ongoing fatigue and increased abdominal girth and was brought back to the emergency room. CT of the chest revealed large pericardial effusion. He was transferred back to [facility] where clinical examination was compatible with suspected tamponade at. He underwent emergent pericardiocentesis and is being observed in the Intensive Care Unit postprocedure. He has elevated white count but no fever. He has had cough productive of scant amount of discolored expectoration. He reports chest discomfort which is not pleuritic in nature. Chest x-ray shows better pulmonary vascular congestion, mild cardiomegaly and a small left effusion. He is on Levaquin and sputum culture is pending.      Review of Systems:     A comprehensive 12 system review of systems was negative except for as documented in HPI    Assessment:   Cardiac tamponade and with syncope on presentation, status post therapeutic pericardiocentesis  Leukocytosis, possible stress induced versus infection  Abnormal chest x-ray with interstitial edema and small left effusion  History of atrial fibrillation and chronic anticoagulation   Other medical problems per chart    Recommendations: Cardiac management per cardiology  IV fluids  Abx- empiric Levaquin  Follow sputum culture  Incentive spirometer  SCDs  Pain control, he requests Norco  Monitor WBC count and CXR  D/W RN    Active Problem List:     Problem List  Date Reviewed: 4/19/2018          Codes Class    S/P ablation of atrial fibrillation ICD-10-CM: Z98.890, Z86.79  ICD-9-CM: V45.89         Cardiac tamponade ICD-10-CM: I31.4  ICD-9-CM: 423.3         A-fib (HCC) ICD-10-CM: I48.91  ICD-9-CM: 427.31         * (Principal)Pericardial tamponade ICD-10-CM: I31.4  ICD-9-CM: 423.3         TIA (transient ischemic attack) ICD-10-CM: G45.9  ICD-9-CM: 435.9         Broca's aphasia ICD-10-CM: R47.01  ICD-9-CM: 268. 3         Paroxysmal atrial fibrillation (HCC) (Chronic) ICD-10-CM: I48.0  ICD-9-CM: 427.31         Chronic anticoagulation (Chronic) ICD-10-CM: Z79.01  ICD-9-CM: P78.29         Systolic CHF, chronic (HCC) (Chronic) ICD-10-CM: I50.22  ICD-9-CM: 428.22, 428.0         Acquired hypothyroidism (Chronic) ICD-10-CM: E03.9  ICD-9-CM: 244.9         Essential hypertension (Chronic) ICD-10-CM: I10  ICD-9-CM: 401.9               Past Medical History:      has a past medical history of Atrial fibrillation ([facility] [address] CAD (coronary artery disease); Headache; Hypertension; Hypothyroidism; and Stroke [facility]). Past Surgical History:      has a past surgical history that includes hx appendectomy (1947); hx thyroidectomy (1950 Partial,    2003 Full); hx cataract removal (2009 x 2); pr cardiac surg procedure unlist (04/09/2018); and cath pericardiocentesis (4/22/2018). Home Medications:     Prior to Admission medications    Medication Sig Start Date End Date Taking? Authorizing Provider   VIT C/E/ZN/COPPR/LUTEIN/ZEAXAN (PRESERVISION AREDS 2 PO) Take  by mouth two (2) times a day. Yes Historical Provider   timolol (TIMOPTIC) 0.5 % ophthalmic solution Administer 1 Drop to both eyes daily.    Yes Phys Other, MD   cephALEXin (KEFLEX) 250 mg capsule Take 1 Cap by mouth three (3) times daily. 18   Tonie Lucia MD   benzonatate (TESSALON) 100 mg capsule Take 1 Cap by mouth three (3) times daily as needed for Cough for up to 7 days. 18  Augusto Wheat MD   latanoprost (XALATAN) 0.005 % ophthalmic solution Administer 1 Drop to both eyes nightly. 18   Historical Provider   aspirin 81 mg chewable tablet Take 1 Tab by mouth daily. 18   Theresa Eric MD   levothyroxine (SYNTHROID) 75 mcg tablet Take 1 Tab by mouth Daily (before breakfast). 18   Theresa Eric MD   rivaroxaban (XARELTO) 20 mg tab tablet Take 1 Tab by mouth daily (with dinner). Indications: PREVENT THROMBOEMBOLISM IN CHRONIC ATRIAL FIBRILLATION 18   Theresa Eric MD   losartan (COZAAR) 100 mg tablet Take 100 mg by mouth daily. Pat Mar MD   metoprolol succinate (TOPROL-XL) 25 mg XL tablet Take 25 mg by mouth daily. Pat Mar MD   tamsulosin (FLOMAX) 0.4 mg capsule Take 0.8 mg by mouth daily. Pat Mar MD       Allergies/Social/Family History: Allergies   Allergen Reactions    Chocolate Flavor Other (comments)      Social History   Substance Use Topics    Smoking status: Never Smoker    Smokeless tobacco: Never Used    Alcohol use No      No family history on file. Objective:   Vital Signs:  Visit Vitals    /80    Pulse 85    Temp 98 °F (36.7 °C)    Resp 18    Ht 6' 3\" (1.905 m)    Wt 104.8 kg (231 lb)    SpO2 96%    BMI 28.87 kg/m2    O2 Flow Rate (L/min): 2 l/min O2 Device: Nasal cannula Temp (24hrs), Av.8 °F (36.6 °C), Min:97.6 °F (36.4 °C), Max:98.1 °F (36.7 °C)           Intake/Output:     Intake/Output Summary (Last 24 hours) at 18 1424  Last data filed at 18 1405   Gross per 24 hour   Intake              900 ml   Output             4535 ml   Net            -3635 ml       Physical Exam:   General:  Alert, cooperative, no distress, appears stated age.    Head:  Normocephalic, without obvious abnormality, atraumatic. Eyes:  Conjunctivae/corneas clear. PERRL   Neck: Supple, symmetrical, no adenopathy, no carotid bruit and no JVD. Lungs:   Decreased BS at bases   Chest wall:  No tenderness or deformity. Has pericardial drain in place. Heart:  Regular rate and rhythm, S1-S2 normal, no murmur, no click, rub or gallop. Abdomen:   Soft, non-tender. Bowel sounds present. No masses,  No organomegaly. Extremities: Atraumatic, no cyanosis or edema. Pulses: Palpable   Skin: No rashes or lesions   Neurologic: Grossly nonfocal, motor strength intact. Psych: intact, no overt anxiety or depression. LABS AND  DATA: Personally reviewed  Recent Labs      04/23/18 0418 04/22/18 2026   WBC  8.7  12.4*   HGB  8.9*  9.6*   HCT  26.9*  28.5*   PLT  249  258     Recent Labs      04/23/18 0418 04/22/18   0850  04/21/18 2015   NA  136  131*  127*   K  4.5  4.8  4.6   CL  106  99  91*   CO2  27  26  22   BUN  16  22*  24*   CREA  0.69*  0.94  1.24   GLU  96  110*  171*   CA  7.3*  8.1*  8.5   MG  2.1   --   2.2   PHOS  2.1*   --    --      Recent Labs      04/22/18   0850  04/21/18 2015   SGOT  62*  77*   AP  93  119*   TP  5.8*  7.0   ALB  2.7*  3.3*   GLOB  3.1  3.7     Recent Labs      04/21/18 2015 04/21/18   0800   INR  2.1*  3.1*   PTP  24.1*  35.1*      No results for input(s): PHI, PCO2I, PO2I, FIO2I in the last 72 hours.   Recent Labs      04/21/18 2015 04/21/18   0800   CPK  82  72   CKMB  <1.0  <1.0   TROIQ  <0.04  0.04       MEDS: Reviewed    Chest Imaging: personally reviewed and report checked    Tele- reviewed    Medical decision making:   I have reviewed the flowsheet and previous day's notes  Acute or chronic illness that poses a threat to life or bodily function  Review and order of Clinical lab tests  Review and Order of Radiology tests  Independent visualization of Image  Reviewed high risk medications/sedatives      Thank you for allowing me to participate in this patient's marissa.    Sandra Barragan MD

## 2018-04-24 ENCOUNTER — APPOINTMENT (OUTPATIENT)
Dept: GENERAL RADIOLOGY | Age: 78
DRG: 315 | End: 2018-04-24
Attending: INTERNAL MEDICINE
Payer: MEDICARE

## 2018-04-24 LAB
ALBUMIN SERPL-MCNC: 2.6 G/DL (ref 3.5–5)
ALBUMIN/GLOB SERPL: 0.8 {RATIO} (ref 1.1–2.2)
ALP SERPL-CCNC: 87 U/L (ref 45–117)
ALT SERPL-CCNC: 103 U/L (ref 12–78)
ANION GAP SERPL CALC-SCNC: 7 MMOL/L (ref 5–15)
AST SERPL-CCNC: 21 U/L (ref 15–37)
BACTERIA SPEC CULT: NORMAL
BASOPHILS # BLD: 0 K/UL (ref 0–0.1)
BASOPHILS NFR BLD: 0 % (ref 0–1)
BILIRUB SERPL-MCNC: 0.4 MG/DL (ref 0.2–1)
BUN SERPL-MCNC: 14 MG/DL (ref 6–20)
BUN/CREAT SERPL: 19 (ref 12–20)
CALCIUM SERPL-MCNC: 8.1 MG/DL (ref 8.5–10.1)
CHLORIDE SERPL-SCNC: 99 MMOL/L (ref 97–108)
CO2 SERPL-SCNC: 26 MMOL/L (ref 21–32)
CREAT SERPL-MCNC: 0.75 MG/DL (ref 0.7–1.3)
DIFFERENTIAL METHOD BLD: ABNORMAL
EOSINOPHIL # BLD: 0 K/UL (ref 0–0.4)
EOSINOPHIL NFR BLD: 0 % (ref 0–7)
ERYTHROCYTE [DISTWIDTH] IN BLOOD BY AUTOMATED COUNT: 13.3 % (ref 11.5–14.5)
GLOBULIN SER CALC-MCNC: 3.2 G/DL (ref 2–4)
GLUCOSE SERPL-MCNC: 138 MG/DL (ref 65–100)
GRAM STN SPEC: NORMAL
HCT VFR BLD AUTO: 31.3 % (ref 36.6–50.3)
HGB BLD-MCNC: 10.6 G/DL (ref 12.1–17)
IMM GRANULOCYTES # BLD: 0.1 K/UL (ref 0–0.04)
IMM GRANULOCYTES NFR BLD AUTO: 1 % (ref 0–0.5)
LYMPHOCYTES # BLD: 0.3 K/UL (ref 0.8–3.5)
LYMPHOCYTES NFR BLD: 4 % (ref 12–49)
MAGNESIUM SERPL-MCNC: 2.2 MG/DL (ref 1.6–2.4)
MCH RBC QN AUTO: 28.6 PG (ref 26–34)
MCHC RBC AUTO-ENTMCNC: 33.9 G/DL (ref 30–36.5)
MCV RBC AUTO: 84.6 FL (ref 80–99)
MONOCYTES # BLD: 0.2 K/UL (ref 0–1)
MONOCYTES NFR BLD: 2 % (ref 5–13)
NEUTS SEG # BLD: 7.8 K/UL (ref 1.8–8)
NEUTS SEG NFR BLD: 93 % (ref 32–75)
NRBC # BLD: 0 K/UL (ref 0–0.01)
NRBC BLD-RTO: 0 PER 100 WBC
PHOSPHATE SERPL-MCNC: 3.4 MG/DL (ref 2.6–4.7)
PLATELET # BLD AUTO: 280 K/UL (ref 150–400)
PMV BLD AUTO: 9.6 FL (ref 8.9–12.9)
POTASSIUM SERPL-SCNC: 4.6 MMOL/L (ref 3.5–5.1)
PROT SERPL-MCNC: 5.8 G/DL (ref 6.4–8.2)
RBC # BLD AUTO: 3.7 M/UL (ref 4.1–5.7)
RBC MORPH BLD: ABNORMAL
SERVICE CMNT-IMP: NORMAL
SODIUM SERPL-SCNC: 132 MMOL/L (ref 136–145)
WBC # BLD AUTO: 8.4 K/UL (ref 4.1–11.1)

## 2018-04-24 PROCEDURE — 85025 COMPLETE CBC W/AUTO DIFF WBC: CPT | Performed by: INTERNAL MEDICINE

## 2018-04-24 PROCEDURE — 97116 GAIT TRAINING THERAPY: CPT

## 2018-04-24 PROCEDURE — 74011250637 HC RX REV CODE- 250/637: Performed by: INTERNAL MEDICINE

## 2018-04-24 PROCEDURE — 74011000250 HC RX REV CODE- 250: Performed by: INTERNAL MEDICINE

## 2018-04-24 PROCEDURE — 83735 ASSAY OF MAGNESIUM: CPT | Performed by: INTERNAL MEDICINE

## 2018-04-24 PROCEDURE — 65610000006 HC RM INTENSIVE CARE

## 2018-04-24 PROCEDURE — 71045 X-RAY EXAM CHEST 1 VIEW: CPT

## 2018-04-24 PROCEDURE — 97530 THERAPEUTIC ACTIVITIES: CPT

## 2018-04-24 PROCEDURE — 97165 OT EVAL LOW COMPLEX 30 MIN: CPT

## 2018-04-24 PROCEDURE — 97161 PT EVAL LOW COMPLEX 20 MIN: CPT

## 2018-04-24 PROCEDURE — 80053 COMPREHEN METABOLIC PANEL: CPT | Performed by: INTERNAL MEDICINE

## 2018-04-24 PROCEDURE — 74011250636 HC RX REV CODE- 250/636: Performed by: INTERNAL MEDICINE

## 2018-04-24 PROCEDURE — 84100 ASSAY OF PHOSPHORUS: CPT | Performed by: INTERNAL MEDICINE

## 2018-04-24 PROCEDURE — G8979 MOBILITY GOAL STATUS: HCPCS

## 2018-04-24 PROCEDURE — G8978 MOBILITY CURRENT STATUS: HCPCS

## 2018-04-24 PROCEDURE — 36415 COLL VENOUS BLD VENIPUNCTURE: CPT | Performed by: INTERNAL MEDICINE

## 2018-04-24 PROCEDURE — 77030029684 HC NEB SM VOL KT MONA -A

## 2018-04-24 RX ORDER — ALBUTEROL SULFATE 0.83 MG/ML
2.5 SOLUTION RESPIRATORY (INHALATION)
Status: DISCONTINUED | OUTPATIENT
Start: 2018-04-24 | End: 2018-04-24

## 2018-04-24 RX ADMIN — COLCHICINE 0.6 MG: 0.6 TABLET, FILM COATED ORAL at 17:39

## 2018-04-24 RX ADMIN — LEVOTHYROXINE SODIUM 75 MCG: 25 TABLET ORAL at 08:53

## 2018-04-24 RX ADMIN — Medication 10 ML: at 06:39

## 2018-04-24 RX ADMIN — METHYLPREDNISOLONE SODIUM SUCCINATE 40 MG: 40 INJECTION, POWDER, FOR SOLUTION INTRAMUSCULAR; INTRAVENOUS at 12:21

## 2018-04-24 RX ADMIN — TAMSULOSIN HYDROCHLORIDE 0.8 MG: 0.4 CAPSULE ORAL at 10:16

## 2018-04-24 RX ADMIN — TIMOLOL MALEATE 1 DROP: 5 SOLUTION/ DROPS OPHTHALMIC at 10:17

## 2018-04-24 RX ADMIN — DEXTROSE MONOHYDRATE 7.5 MG/HR: 50 INJECTION, SOLUTION INTRAVENOUS at 11:42

## 2018-04-24 RX ADMIN — METOPROLOL SUCCINATE 25 MG: 25 TABLET, EXTENDED RELEASE ORAL at 10:15

## 2018-04-24 RX ADMIN — COLCHICINE 0.6 MG: 0.6 TABLET, FILM COATED ORAL at 10:13

## 2018-04-24 RX ADMIN — Medication 10 ML: at 16:18

## 2018-04-24 RX ADMIN — MUPIROCIN: 20 OINTMENT TOPICAL at 17:57

## 2018-04-24 RX ADMIN — LATANOPROST 1 DROP: 50 SOLUTION OPHTHALMIC at 22:17

## 2018-04-24 RX ADMIN — MULTIPLE VITAMINS W/ MINERALS TAB 1 TABLET: TAB at 10:15

## 2018-04-24 RX ADMIN — METOROPROLOL TARTRATE 5 MG: 5 INJECTION, SOLUTION INTRAVENOUS at 17:56

## 2018-04-24 RX ADMIN — Medication 10 ML: at 22:14

## 2018-04-24 RX ADMIN — LEVOFLOXACIN 750 MG: 750 TABLET, FILM COATED ORAL at 10:16

## 2018-04-24 RX ADMIN — MUPIROCIN: 20 OINTMENT TOPICAL at 10:17

## 2018-04-24 NOTE — PROGRESS NOTES
1900 Report received from [facility] JOHN SHEPPARD.    2000 Pericardial drain flushed with 10ml normal saline. No clots noted. Minimal output from drain-unmeasurable at this time. 2110 Spoke with Dr. Katelynn Atkins regarding pt going into afib with rvr, -190s. EKG performed. Pt denies CP but states he can feel the fluttering in his chest. Order received for 5mg IV metoprolol now and to schedule for 5mg IV metoprolol q6h.     2330 Report given to Darren Bowers RN.

## 2018-04-24 NOTE — PROGRESS NOTES
Problem: Mobility Impaired (Adult and Pediatric)  Goal: *Acute Goals and Plan of Care (Insert Text)  Physical Therapy Goals  Initiated 4/24/2018  1. Patient will move from supine to sit and sit to supine , scoot up and down and roll side to side in bed with independence within 7 day(s). 2.  Patient will transfer from bed to chair and chair to bed with independence using the least restrictive device within 7 day(s). 3.  Patient will perform sit to stand with independence within 7 day(s). 4.  Patient will ambulate with independence for 200 feet with the least restrictive device within 7 day(s). 5.  Patient will ascend/descend 16 stairs with 1 handrail(s) with modified independence within 7 day(s). physical Therapy EVALUATION  Patient: Malinda Ag (81 y.o. male)  Date: 4/24/2018  Primary Diagnosis: Pericardial Effusion  Syncope  Cardiac tamponade        Precautions:        ASSESSMENT :  Based on the objective data described below, the patient presents with generalized weakness, impaired balance, decreased endurance/activity tolerance, dizziness during mobility, and overall impaired functional mobility. Pt received supine in bed, agreeable to participation with therapy. Pt assumed seated position EOB at supervision level w/ intact sitting balance. Remaining functional mobility occurred with CGA including sit>>stand transfer and ambulation trial of 15ft. Pt with decreased gait speed in addition to mild increase in trunk sway. Overall, gait pattern very cautious as pt c/o increased dizziness during mobility. Vital signs assessed throughout therapy session with pt negative for orthostatic hypotension and all vital signs remaining stable on RW. At this time, pt is functioning well below his baseline independent level and would benefit from additional skilled therapy to address the above mentioned deficits.  Recommend continued x1 person assist during all OOB mobility/ambulation as pt is a falls risk given mobility deficits. Anticipate that pt will be appropriate to discharge home w/ [facility] and assist of wife. Patient will benefit from skilled intervention to address the above impairments. Patients rehabilitation potential is considered to be Good  Factors which may influence rehabilitation potential include:   []         None noted  []         Mental ability/status  [x]         Medical condition  []         Home/family situation and support systems  []         Safety awareness  []         Pain tolerance/management  []         Other:      PLAN :  Recommendations and Planned Interventions:  [x]           Bed Mobility Training             []    Neuromuscular Re-Education  [x]           Transfer Training                   []    Orthotic/Prosthetic Training  [x]           Gait Training                         []    Modalities  [x]           Therapeutic Exercises           []    Edema Management/Control  [x]           Therapeutic Activities            [x]    Patient and Family Training/Education  [x]           Other (comment): stair climbing    Frequency/Duration: Patient will be followed by physical therapy  4 times a week to address goals. Discharge Recommendations: [facility]  Further Equipment Recommendations for Discharge: None     SUBJECTIVE:   Patient stated I left here 12 days ago.     OBJECTIVE DATA SUMMARY:   HISTORY:    Past Medical History:   Diagnosis Date    Atrial fibrillation ([facility] [address]     CAD (coronary artery disease)     Headache     Hypertension     Hypothyroidism     Stroke [facility])      Past Surgical History:   Procedure Laterality Date    CARDIAC SURG PROCEDURE UNLIST  04/09/2018    cardiac ablation, implant permanent cardiac monitor-[employer]    CATH PERICARDIOCENTESIS  4/22/2018         HX APPENDECTOMY  1947    HX CATARACT REMOVAL  2009 x 2    HX THYROIDECTOMY  1950 Partial,    2003 Full     Prior Level of Function/Home Situation: Pt lives at home w/ wife. Reports independence w/ ambulation and ADLs. History of 2 syncopal episodes just prior to admission however denies other falls. Denies home O2 use. Recently discharged home from [facility] 12 days ago with no further therapy services. Was previously working in an "[employer] with his wife. Personal factors and/or comorbidities impacting plan of care:     Home Situation  Home Environment: Private residence  # Steps to Enter: 3  Rails to Enter: No  One/Two Story Residence: Two story  # of Interior Steps: 16  Height of Each Step (in):  (unknown)  Interior Rails: Left  Lift Chair Available: No  Living Alone: No  Support Systems: Spouse/Significant Other/Partner  Patient Expects to be Discharged to[de-identified] Private residence  Current DME Used/Available at Home: None    EXAMINATION/PRESENTATION/DECISION MAKING:   Critical Behavior:  Neurologic State: Alert  Orientation Level: Oriented X4  Cognition: Appropriate decision making, Follows commands  Safety/Judgement: Awareness of environment  Hearing: Auditory  Auditory Impairment: Hard of hearing, bilateral  Skin:  Intact  Edema:  None noted   Range Of Motion:  AROM: Within functional limits                       Strength:    Strength: Generally decreased, functional                    Tone & Sensation:                  Sensation: Intact               Coordination:  Coordination: Within functional limits  Vision:   Tracking: Able to track stimulus in all quadrants w/o difficulty  Acuity: Within Defined Limits  Corrective Lenses: Reading glasses  Functional Mobility:  Bed Mobility:     Supine to Sit: Supervision        Transfers:  Sit to Stand: Contact guard assistance; Additional time  Stand to Sit: Contact guard assistance        Bed to Chair: Contact guard assistance              Balance:   Sitting: Intact  Standing: Impaired; Without support  Standing - Static: Good  Standing - Dynamic : Poor  Ambulation/Gait Training:  Distance (ft): 15 Feet (ft)  Assistive Device: Gait belt  Ambulation - Level of Assistance: Contact guard assistance;Assist x1        Gait Abnormalities: Shuffling gait;Trunk sway increased        Base of Support: Narrowed     Speed/Susannah: Shuffled  Step Length: Left shortened;Right shortened                  Functional Measure:  Barthel Index:    Bathin  Bladder: 10  Bowels: 10  Groomin  Dressin  Feeding: 10  Mobility: 10  Stairs: 5  Toilet Use: 5  Transfer (Bed to Chair and Back): 10  Total: 70       Barthel and G-code impairment scale:  Percentage of impairment CH  0% CI  1-19% CJ  20-39% CK  40-59% CL  60-79% CM  80-99% CN  100%   Barthel Score 0-100 100 99-80 79-60 59-40 20-39 1-19   0   Barthel Score 0-20 20 17-19 13-16 9-12 5-8 1-4 0      The Barthel ADL Index: Guidelines  1. The index should be used as a record of what a patient does, not as a record of what a patient could do. 2. The main aim is to establish degree of independence from any help, physical or verbal, however minor and for whatever reason. 3. The need for supervision renders the patient not independent. 4. A patient's performance should be established using the best available evidence. Asking the patient, friends/relatives and nurses are the usual sources, but direct observation and common sense are also important. However direct testing is not needed. 5. Usually the patient's performance over the preceding 24-48 hours is important, but occasionally longer periods will be relevant. 6. Middle categories imply that the patient supplies over 50 per cent of the effort. 7. Use of aids to be independent is allowed. Evelyne Kwong., Barthel, D.W. ([id number]). Functional evaluation: the Barthel Index. [address] [facility] (14)2. Malcolm Marcano evelio NGUYỄN Courtney, Natalio Delgado., Sandra Clemente.[employer], [address] [place] [address] (). Measuring the change indisability after inpatient rehabilitation; comparison of the responsiveness of the Barthel Index and Functional [place] Measure. Journal of Neurology, Neurosurgery, and Psychiatry, 66(4), 240-145.   Malcolm Marcano Exel, N.J.A, Elfego Bell M.A. (2004.) Assessment of post-stroke quality of life in cost-effectiveness studies: The usefulness of the Barthel Index and the EuroQoL-5D. Quality of Life Research, 13, 190-15       G codes: In compliance with CMSs Claims Based Outcome Reporting, the following G-code set was chosen for this patient based on their primary functional limitation being treated: The outcome measure chosen to determine the severity of the functional limitation was the Barthel Index with a score of 70/100 which was correlated with the impairment scale. ? Mobility - Walking and Moving Around:     - CURRENT STATUS: CJ - 20%-39% impaired, limited or restricted    - GOAL STATUS: CI - 1%-19% impaired, limited or restricted    - D/C STATUS:  ---------------To be determined---------------      Physical Therapy Evaluation Charge Determination   History Examination Presentation Decision-Making   MEDIUM  Complexity : 1-2 comorbidities / personal factors will impact the outcome/ POC  MEDIUM Complexity : 3 Standardized tests and measures addressing body structure, function, activity limitation and / or participation in recreation  MEDIUM Complexity : Evolving with changing characteristics  LOW Complexity : FOTO score of       Based on the above components, the patient evaluation is determined to be of the following complexity level: MEDIUM    Pain:  Pain Scale 1: Numeric (0 - 10)  Pain Intensity 1: 0              Activity Tolerance:   VSS throughout on RA  Please refer to the flowsheet for vital signs taken during this treatment.   After treatment:   [x]         Patient left in no apparent distress sitting up in chair  []         Patient left in no apparent distress in bed  [x]         Call bell left within reach  [x]         Nursing notified  []         Caregiver present  [x]         Bed alarm activated    COMMUNICATION/EDUCATION:   The patients plan of care was discussed with: Occupational Therapist and Registered Nurse. [x]         Fall prevention education was provided and the patient/caregiver indicated understanding. [x]         Patient/family have participated as able in goal setting and plan of care. [x]         Patient/family agree to work toward stated goals and plan of care. []         Patient understands intent and goals of therapy, but is neutral about his/her participation. []         Patient is unable to participate in goal setting and plan of care.     Thank you for this referral.  Flor Servin, PT, DPT   Time Calculation: 21 mins

## 2018-04-24 NOTE — PROGRESS NOTES
Anthony - Bedside and verbal report received from [facility] and [facility]. Patient is lying in bed, appears to be asleep and resting comfortably. Episode of Afib with RVR overnight noted. Currently on Cardizem gtt at 7.5. Currently NSR. VS stable. 4546 - Flushed pericardial drain catheter with 10cc NS. Catheter flushed easily. Aspirated catheter for scant return of serosanguinous drainage. Curos cap placed on stopcock, stopcock positioned off to open port. 1014 - Patient OOB in chair after session with PT/OT. New sanguinous drainage noted in pericardial catheter drainage bag.       1250 - 150cc sanguinous drainage emptied from pericardial catheter drainage bag. Catheter flushed with 10cc NS. Aspirated for no return of drainage. Curos cap placed on stopcock, stopcock positioned off to open port. 2100 - Pericardial catheter flushed with 2cc NS. Aspirated for no return of drainage. Curos cap placed on stopcock, stopcock positioned off to open port. 2300 - Bedside and verbal report given to Erica Robison RN.

## 2018-04-24 NOTE — PROGRESS NOTES
PULMONARY/CRITICAL CARE/SLEEP MEDICINE    Physician Consultation Note    Name: Rehan Stevenson   : 1940   MRN: [id number]   Date: 2018        Assessment:     Due to pt having Pericardial drain needs to stay in the ICU  Cardiac tamponade and with syncope on presentation, status post therapeutic pericardiocentesis  Pt has persistent pericardial drainage nearly 700-1000 cc per day. This may be due to pericarditis? ? Discussed with Mr. Robert Nj and Dr. Jasmina Herrera is on Colchicine, Solumedrol. Leukocytosis, possible stress induced versus infection  Abnormal chest x-ray with interstitial edema and small left effusion  History of atrial fibrillation and chronic anticoagulation, he did have recurrent Atrial fib last pm.   Other medical problems per chart  Feels weak. Remains acutely ill, moderate risk of decompensation. Recommendations:   Cardiac management per cardiology  IV fluids  Abx- empiric Levaquin  Follow sputum culture  Incentive spirometer  SCDs  Pain control, he requests Norco  Monitor WBC count and CXR  D/W RN                    Subjective:   Consult Note: 2018   Requesting Physician: Dr. Eze James  Reason for consult: Pneumonia    Last 24 hrs: Pt has mild chest pain at site of pericardial drain. NO coughing, no back pain. No abdominal. He feels that his breathing is pretty comfortable today. NO headaches. Tolerating po intake. NO leg pain. No issues with constipation or diarrhea. Did not have any dizziness. Rest of ROS is negative of 10 systems except as above. Medical records and data reviewed. Patient is a 78 y.o. male who was brought to the hospital with syncope. Patient underwent ablation and implantation of a loop monitor on  which was complicated by pericardial tamponade managed with pericardiocentesis. After discharge she developed 2 episodes of syncope, ongoing fatigue and increased abdominal girth and was brought back to the emergency room.  CT of the chest revealed large pericardial effusion. He was transferred back to [facility] where clinical examination was compatible with suspected tamponade at. He underwent emergent pericardiocentesis and is being observed in the Intensive Care Unit postprocedure. He has elevated white count but no fever. He has had cough productive of scant amount of discolored expectoration. He reports chest discomfort which is not pleuritic in nature. Chest x-ray shows better pulmonary vascular congestion, mild cardiomegaly and a small left effusion. He is on Levaquin and sputum culture is pending. Review of Systems:     A comprehensive 12 system review of systems was negative except for as documented in HPI      Active Problem List:     Problem List  Date Reviewed: 4/19/2018          Codes Class    S/P ablation of atrial fibrillation ICD-10-CM: Z98.890, Z86.79  ICD-9-CM: V45.89         Cardiac tamponade ICD-10-CM: I31.4  ICD-9-CM: 423.3         A-fib (HCC) ICD-10-CM: I48.91  ICD-9-CM: 427.31         * (Principal)Pericardial tamponade ICD-10-CM: I31.4  ICD-9-CM: 423.3         TIA (transient ischemic attack) ICD-10-CM: G45.9  ICD-9-CM: 435.9         Broca's aphasia ICD-10-CM: R47.01  ICD-9-CM: 849. 3         Paroxysmal atrial fibrillation (HCC) (Chronic) ICD-10-CM: I48.0  ICD-9-CM: 427.31         Chronic anticoagulation (Chronic) ICD-10-CM: Z79.01  ICD-9-CM: J08.80         Systolic CHF, chronic (HCC) (Chronic) ICD-10-CM: I50.22  ICD-9-CM: 428.22, 428.0         Acquired hypothyroidism (Chronic) ICD-10-CM: E03.9  ICD-9-CM: 244.9         Essential hypertension (Chronic) ICD-10-CM: I10  ICD-9-CM: 401.9               Past Medical History:      has a past medical history of Atrial fibrillation ([facility] [address] CAD (coronary artery disease); Headache; Hypertension; Hypothyroidism; and Stroke [facility]).     Past Surgical History:      has a past surgical history that includes hx appendectomy (1947); hx thyroidectomy (1950 Partial,    2003 Full); hx cataract removal (2009 x 2); pr cardiac surg procedure unlist (04/09/2018); and cath pericardiocentesis (4/22/2018). Home Medications:     Prior to Admission medications    Medication Sig Start Date End Date Taking? Authorizing Provider   cephALEXin (KEFLEX) 250 mg capsule Take 1 Cap by mouth three (3) times daily. 4/21/18  Yes Costella Blocker, MD   benzonatate (TESSALON) 100 mg capsule Take 1 Cap by mouth three (3) times daily as needed for Cough for up to 7 days. 4/19/18 4/26/18 Yes Augusto Camp MD   VIT C/E/ZN/COPPR/LUTEIN/ZEAXAN (PRESERVISION AREDS 2 PO) Take  by mouth two (2) times a day. Yes Historical Provider   latanoprost (XALATAN) 0.005 % ophthalmic solution Administer 1 Drop to both eyes nightly. 2/22/18  Yes Historical Provider   aspirin 81 mg chewable tablet Take 1 Tab by mouth daily. 2/2/18  Yes Rocio Sanchez MD   levothyroxine (SYNTHROID) 75 mcg tablet Take 1 Tab by mouth Daily (before breakfast). 2/2/18  Yes Rocio Sanchez MD   rivaroxaban (XARELTO) 20 mg tab tablet Take 1 Tab by mouth daily (with dinner). Indications: PREVENT THROMBOEMBOLISM IN CHRONIC ATRIAL FIBRILLATION 2/1/18  Yes Rocio Sanchez MD   losartan (COZAAR) 100 mg tablet Take 100 mg by mouth daily. Yes Pat Mar MD   metoprolol succinate (TOPROL-XL) 25 mg XL tablet Take 25 mg by mouth daily. Yes Pat Mar MD   tamsulosin (FLOMAX) 0.4 mg capsule Take 0.8 mg by mouth daily. Yes Pat Mar MD   timolol (TIMOPTIC) 0.5 % ophthalmic solution Administer 1 Drop to both eyes daily. Yes Pat Mar MD       Allergies/Social/Family History: Allergies   Allergen Reactions    Chocolate Flavor Other (comments)      Social History   Substance Use Topics    Smoking status: Never Smoker    Smokeless tobacco: Never Used    Alcohol use No      No family history on file.          Objective:   Vital Signs:  Visit Vitals    /41    Pulse 96    Temp 98.6 °F (37 °C)    Resp 20    Ht 6' 3\" (1.905 m)    Wt 102.5 kg (225 lb 14.4 oz)    SpO2 97%    BMI 28.24 kg/m2    O2 Flow Rate (L/min): 2 l/min O2 Device: Room air Temp (24hrs), Av.1 °F (36.7 °C), Min:97.5 °F (36.4 °C), Max:98.7 °F (37.1 °C)           Intake/Output:     Intake/Output Summary (Last 24 hours) at 18 1146  Last data filed at 18 1027   Gross per 24 hour   Intake          1613.67 ml   Output             4725 ml   Net         -3111.33 ml       Physical Exam:   General:  Alert, cooperative, no distress, appears stated age. Head:  Normocephalic, without obvious abnormality, atraumatic. Eyes:  Conjunctivae/corneas clear. PERRL   Neck: Supple, symmetrical, no adenopathy, no carotid bruit and no JVD. Lungs:   Decreased BS at bases but clear and breathing comfortably. Chest wall:  No tenderness or deformity. Has pericardial drain in place. Heart:  Regular rate and rhythm, S1-S2 normal, no murmur, no click, rub or gallop. Abdomen:   Soft, non-tender. Bowel sounds present. No masses,  No organomegaly. Extremities: Atraumatic, no cyanosis or edema. Pulses: Palpable   Skin: No rashes or lesions   Neurologic: Grossly nonfocal, motor strength intact. Psych: intact, no overt anxiety or depression. LABS AND  DATA: Personally reviewed  Recent Labs      18   0433  18   1543  18   0418   WBC  8.4   --   8.7   HGB  10.6*  10.1*  8.9*   HCT  31.3*  30.9*  26.9*   PLT  280   --   249     Recent Labs      18   0433  18   0418   NA  132*  136   K  4.6  4.5   CL  99  106   CO2  26  27   BUN  14  16   CREA  0.75  0.69*   GLU  138*  96   CA  8.1*  7.3*   MG  2.2  2.1   PHOS  3.4  2.1*     Recent Labs      18   0433  18   0850   SGOT  21  62*   AP  87  93   TP  5.8*  5.8*   ALB  2.6*  2.7*   GLOB  3.2  3.1     Recent Labs      18   INR  2.1*   PTP  24.1*      No results for input(s): PHI, PCO2I, PO2I, FIO2I in the last 72 hours.   Recent Labs      18   CPK  82   CKMB  <1.0   TROIQ <0.04       MEDS: Reviewed    Chest Imaging: personally reviewed and report checked    Tele- reviewed    Medical decision making:   I have reviewed the flowsheet and previous day's notes  Acute or chronic illness that poses a threat to life or bodily function  Review and order of Clinical lab tests  Review and Order of Radiology tests  Independent visualization of Image  Reviewed high risk medications/sedatives      Thank you for allowing me to participate in this patient's care.     Tae Yip MD

## 2018-04-24 NOTE — PROGRESS NOTES
Spiritual Care Assessment/Progress Note  [facility]      NAME: Shelby Plasencia      MRN: [id number]  AGE: 78 y.o. SEX: male  Episcopal Affiliation: Pentecostal   Language: English     4/24/2018     Total Time (in minutes): 22     Spiritual Assessment begun in [facility] CRITICAL CARE 2 through conversation with:         [x]Patient        [] Family    [] Friend(s)        Reason for Consult: Request by patient     Spiritual beliefs: (Please include comment if needed)     [x] Involved in a ten tradition/spiritual practice:     [x] Supported by a ten community:      [] Claims no spiritual orientation:      [] Seeking spiritual identity:           [] Adheres to an individual form of spirituality:      [] Not able to assess:                     Identified resources for coping:      [x] Prayer                  [] Devotional reading               [] Music                  [] Guided Imagery     [x] Family/friends                 [] Pet visits     [x] Other:  Sacraments       Interventions offered during this visit: (See comments for more details)    Patient Interventions: Affirmation of ten, Affirmation of emotions/emotional suffering, Iconic (affirming the presence of God/Higher Power), Communion (Pentecostal), Prayer (actual), Episcopal beliefs/image of God discussed     Family/Friend(s):  Affirmation of ten, Communion (Pentecostal), Iconic (affirming the presence of God/Higher Power), Prayer (actual), Episcopal beliefs/image of God discussed     Plan of Care:     [x] Discuss Spiritual/Cultural needs    [] Support AMD and/or advance care planning process      [] Support grieving process   [x] Coordinate Rites/Rituals    [x] Coordination with community clergy   [] No spiritual needs identified at this time   [] Detailed Plan of Care below (See Comments)  [] Make referral to Music Therapy  [] Make referral to Pet Therapy     [] Make referral to Addiction services  [] Make referral to [facility]  [] Make referral to Spiritual Care Partner  [] No future visits requested             Comments:   Received a request from a patient wanting to see a .  Patient was listed as Non-Gnosticist.  Visited with patient, verified he is Voodoo and had the demographic data changed to reflect his ten tradition. Patient shared he was anointed two weeks ago, but would like to receive Communion. Explained that as a  who is Voodoo, I am able to provide that as long as he is in CCU. A volunteer will bring Communion when he moves to another unit. Patient was happy to be able to receive today. Shared prayer aloud and then he and his wife received Communion. He is still interested in a visit on Thursday when the [facility]  will be here. Plan to visit again tomorrow for Communion.     SHANE Dominique, [facility], [address] [place] [address] Avenue    [address] Paging Service  [phone])

## 2018-04-24 NOTE — PROGRESS NOTES
Cardiac Electrophysiology Progress Note            [address] [address]  [phone]    4/24/2018 1:10 PM    Admit Date: 4/21/2018    Admit Diagnosis: Pericardial Effusion  Syncope  Cardiac tamponade    Subjective:     Ajith Patterson   denies chest pain, chest pressure/discomfort, dyspnea, lower extremity edema. His pain with inspiration has resolved.      Visit Vitals    /61 (BP 1 Location: Left arm, BP Patient Position: At rest)    Pulse 95    Temp 97.5 °F (36.4 °C)    Resp 20    Ht 6' 3\" (1.905 m)    Wt 225 lb 14.4 oz (102.5 kg)    SpO2 95%    BMI 28.24 kg/m2     Current Facility-Administered Medications   Medication Dose Route Frequency    albuterol (PROVENTIL VENTOLIN) nebulizer solution 2.5 mg  2.5 mg Nebulization Q6H PRN    levoFLOXacin (LEVAQUIN) tablet 750 mg  750 mg Oral Q24H    [START ON 4/25/2018] methylPREDNISolone (PF) (SOLU-MEDROL) injection 30 mg  30 mg IntraVENous ONCE    colchicine tablet 0.6 mg  0.6 mg Oral BID    metoprolol (LOPRESSOR) injection 5 mg  5 mg IntraVENous Q6H    mupirocin (BACTROBAN) 2 % ointment   Both Nostrils BID    benzonatate (TESSALON) capsule 100 mg  100 mg Oral TID PRN    latanoprost (XALATAN) 0.005 % ophthalmic solution 1 Drop  1 Drop Both Eyes QHS    levothyroxine (SYNTHROID) tablet 75 mcg  75 mcg Oral ACB    metoprolol succinate (TOPROL-XL) XL tablet 25 mg  25 mg Oral DAILY    tamsulosin (FLOMAX) capsule 0.8 mg  0.8 mg Oral DAILY    timolol (TIMOPTIC) 0.5 % ophthalmic solution 1 Drop  1 Drop Both Eyes DAILY    multivitamin, tx-iron-ca-min (THERA-M w/ IRON) tablet 1 Tab  1 Tab Oral DAILY    sodium chloride (NS) flush 5-10 mL  5-10 mL IntraVENous Q8H    sodium chloride (NS) flush 5-10 mL  5-10 mL IntraVENous PRN    acetaminophen (TYLENOL) solution 650 mg  650 mg Oral Q4H PRN    chlorpheniramine-HYDROcodone (TUSSIONEX) oral suspension 5 mL  5 mL Oral Q12H PRN    HYDROcodone-acetaminophen (NORCO)  mg tablet 1 Tab 1 Tab Oral Q4H PRN         Objective:      Visit Vitals    /61 (BP 1 Location: Left arm, BP Patient Position: At rest)    Pulse 95    Temp 97.5 °F (36.4 °C)    Resp 20    Ht 6' 3\" (1.905 m)    Wt 225 lb 14.4 oz (102.5 kg)    SpO2 95%    BMI 28.24 kg/m2       Physical Exam:  Abdomen: soft, non-tender  Extremities: extremities normal  Heart: regular rate and rhythm  Lungs: clear to auscultation bilaterally  Pulses: 2+ and symmetric    Data Review:   Labs:    Recent Labs      04/24/18   0433  04/23/18   1543  04/23/18   0418  04/22/18 2026   WBC  8.4   --   8.7  12.4*   HGB  10.6*  10.1*  8.9*  9.6*   HCT  31.3*  30.9*  26.9*  28.5*   PLT  280   --   249  258     Recent Labs      04/24/18   0433  04/23/18   0418  04/22/18   0850  04/21/18 2015   NA  132*  136  131*  127*   K  4.6  4.5  4.8  4.6   CL  99  106  99  91*   CO2  26  27  26  22   GLU  138*  96  110*  171*   BUN  14  16  22*  24*   CREA  0.75  0.69*  0.94  1.24   CA  8.1*  7.3*  8.1*  8.5   MG  2.2  2.1   --   2.2   PHOS  3.4  2.1*   --    --    ALB  2.6*   --   2.7*  3.3*   TBILI  0.4   --   0.6  0.7   SGOT  21   --   62*  77*   ALT  103*   --   157*  195*   INR   --    --    --   2.1*       Recent Labs      04/21/18 2015   TROIQ  <0.04   CPK  82   CKMB  <1.0         Intake/Output Summary (Last 24 hours) at 04/24/18 1310  Last data filed at 04/24/18 1249   Gross per 24 hour   Intake           913.67 ml   Output             4655 ml   Net         -3741.33 ml        Telemetry: sinus rhythm ventricular rate 80.      Assessment:     Principal Problem:    Pericardial tamponade (4/9/2018)    Active Problems:    Paroxysmal atrial fibrillation ([address] (1/31/2018)      Chronic anticoagulation (1/31/2018)      Essential hypertension (1/31/2018)      S/P ablation of atrial fibrillation (4/22/2018)      Cardiac tamponade (4/22/2018)        Plan:     Cynthia Wong is S/P Cardiac tamponade and with syncope on presentation, S/P therapeutic pericardiocentesis. Noted to have afib RVR last night, started on cardizem. Has remained in sinus today. Bps stable. Will D/C cardizem. Continue PO beta blocker. H/H have improved. Continues to have some serosanguinous drainage. Will leave drain in at this time. On steroid and colchicine. Consult Cardiac surgery - appreciate their input.       CURTIS Michelle MD, [facility]  4/24/2018  1:10 PM

## 2018-04-24 NOTE — PROGRESS NOTES
2215. Metoprolol 5mg IVP given. HR decrease to 120's and return to 150's - 160's. Will continue to monitor. 2305 IV cardizem 10mg IVP given HR decrease to 120's - 130's. IV Cardizem drip started at 5mg/hr  0000 Cardizem maintained at 5mg/hr. HR slowly coming down. Pigtail drain aspirated and irrigated, Will continue to monitor. 0200 Pigtail drain aspirated and irrigated,  0400 labs sent, repositioned for comfort, afib in 130's-150's. On dilt drip. Pigtail aspirated and irrigated.   0600 converts to NSR

## 2018-04-24 NOTE — PROGRESS NOTES
Occupational Therapy Goals  Initiated 4/24/2018  1. Patient will perform standing ADLs x 5 minutes without seated rest break with independence within 7 day(s). 2.  Patient will perform upper body dressing and lower body dressing with independence within 7 day(s). 3.  Patient will gather materials for ADL task with independence within 7 day(s). 4.  Patient will perform toilet transfers in bathroom with independence within 7 day(s). 5.  Patient will perform all aspects of toileting with independence within 7 day(s). Occupational Therapy EVALUATION  Patient: Arnulfo Lynch (71 y.o. male)  Date: 4/24/2018  Primary Diagnosis: Pericardial Effusion  Syncope  Cardiac tamponade        Precautions: fall       ASSESSMENT :  Based on the objective data described below, the patient presents with generalized weakness, decreased activity tolerance, but likely close to ADL baseline s/p pericardial effusion and cardiac tamponade. Pt with pericardial drain in place. Pt recently admitted to [facility] two weeks ago for cardiac tamponade and was discharged home with family, independently completing ADLs. This date, pt was supervision for bed mobility, CGA for sit<> stand and standing toileting. HR and BP stable on RA during activity. He is needing CGA to min A for ADLs but will likely need 1-2 more OT sessions as pt is generally weak. Recommend HHPT at discharge. Patient will benefit from skilled intervention to address the above impairments.   Patients rehabilitation potential is considered to be Good  Factors which may influence rehabilitation potential include:   [x]             None noted  []             Mental ability/status  []             Medical condition  []             Home/family situation and support systems  []             Safety awareness  []             Pain tolerance/management  []             Other:      PLAN :  Recommendations and Planned Interventions:  [x]               Self Care Training                  [x] Therapeutic Activities  [x]               Functional Mobility Training    []        Cognitive Retraining  [x]               Therapeutic Exercises           [x]        Endurance Activities  [x]               Balance Training                   []        Neuromuscular Re-Education  []               Visual/Perceptual Training     [x]   Home Safety Training  [x]               Patient Education                 [x]        Family Training/Education  []               Other (comment):    Frequency/Duration: Patient will be followed by occupational therapy 3 times a week to address goals. Discharge Recommendations: Home Health PT  Further Equipment Recommendations for Discharge: None     SUBJECTIVE:   Patient stated I was home and passed out twice.     OBJECTIVE DATA SUMMARY:   HISTORY:   Past Medical History:   Diagnosis Date    Atrial fibrillation ([address]     CAD (coronary artery disease)     Headache     Hypertension     Hypothyroidism     Stroke [facility])      Past Surgical History:   Procedure Laterality Date    CARDIAC SURG PROCEDURE UNLIST  04/09/2018    cardiac ablation, implant permanent cardiac monitor-[employer]tr[employer]    CATH PERICARDIOCENTESIS  4/22/2018         HX APPENDECTOMY  1947    HX CATARACT REMOVAL  2009 x 2    HX THYROIDECTOMY  1950 Partial,    2003 Full       Prior Level of Function/Environment/Context: [facility]  Home, independent with ADLs and functional mobility without AD  Occupations in which the patient is/was successful, what are the barriers preventing that success:   Performance Patterns (routines, roles, habits, and rituals):   Personal Interests and/or values:   Expanded or extensive additional review of patient history:     Home Situation  Home Environment: Private residence  # Steps to Enter: 3  Rails to Enter: No  One/Two Story Residence: Two story  # of Interior Steps: 16  Height of Each Step (in):  (unknown)  Interior Rails: Left  Lift Chair Available: No  Living Alone: No  Support Systems: Spouse/Significant Other/Partner  Patient Expects to be Discharged to[de-identified] Private residence  Current DME Used/Available at Home: None  [x]  Right hand dominant   []  Left hand dominant    EXAMINATION OF PERFORMANCE DEFICITS:  Cognitive/Behavioral Status:  Neurologic State: Alert  Orientation Level: Oriented X4  Cognition: Appropriate decision making; Follows commands  Perception: Appears intact  Perseveration: No perseveration noted  Safety/Judgement: Awareness of environment    Skin: intact    Edema: none noted    Hearing: Auditory  Auditory Impairment: Hard of hearing, bilateral    Vision/Perceptual:    Tracking: Able to track stimulus in all quadrants w/o difficulty                      Acuity: Within Defined Limits    Corrective Lenses: Reading glasses    Range of Motion:    AROM: Within functional limits                         Strength:    Strength: Generally decreased, functional                Coordination:  Coordination: Within functional limits  Fine Motor Skills-Upper: Right Intact; Left Intact    Gross Motor Skills-Upper: Left Intact; Right Intact    Tone & Sensation:       Sensation: Intact                      Balance:  Sitting: Intact  Standing: Impaired; Without support  Standing - Static: Good  Standing - Dynamic : Poor    Functional Mobility and Transfers for ADLs:  Bed Mobility:  Supine to Sit: Supervision    Transfers:  Sit to Stand: Contact guard assistance; Additional time  Stand to Sit: Contact guard assistance  Bed to Chair: Contact guard assistance  Toilet Transfer : Contact guard assistance (standing to urinate)    ADL Assessment:  Feeding: Independent    Oral Facial Hygiene/Grooming: Contact guard assistance    Bathing: Minimum assistance    Upper Body Dressing: Supervision    Lower Body Dressing: Minimum assistance    Toileting: Contact guard assistance                ADL Intervention and task modifications:         Pt educated on role of OT and required verbal cues for safety and proper hand placement during ADLs/functional transfers. Cognitive Retraining  Safety/Judgement: Awareness of environment      Functional Measure:  Barthel Index:    Bathin  Bladder: 10  Bowels: 10  Groomin  Dressin  Feeding: 10  Mobility: 10  Stairs: 5  Toilet Use: 5  Transfer (Bed to Chair and Back): 10  Total: 70       Barthel and G-code impairment scale:  Percentage of impairment CH  0% CI  1-19% CJ  20-39% CK  40-59% CL  60-79% CM  80-99% CN  100%   Barthel Score 0-100 100 99-80 79-60 59-40 20-39 1-19   0   Barthel Score 0-20 20 17-19 13-16 9-12 5-8 1-4 0      The Barthel ADL Index: Guidelines  1. The index should be used as a record of what a patient does, not as a record of what a patient could do. 2. The main aim is to establish degree of independence from any help, physical or verbal, however minor and for whatever reason. 3. The need for supervision renders the patient not independent. 4. A patient's performance should be established using the best available evidence. Asking the patient, friends/relatives and nurses are the usual sources, but direct observation and common sense are also important. However direct testing is not needed. 5. Usually the patient's performance over the preceding 24-48 hours is important, but occasionally longer periods will be relevant. 6. Middle categories imply that the patient supplies over 50 per cent of the effort. 7. Use of aids to be independent is allowed. Emilie Sullivan., Barthel, D.W. (3285). Functional evaluation: the Barthel Index. [address] [facility] (14)2. [facility] Press evelio StacyThe University of [facility] Campusole, NGUYỄN, Navid Ramires., [facility], [address] [facility] (). Measuring the change indisability after inpatient rehabilitation; comparison of the responsiveness of the Barthel Index and Functional Spencer Measure. Journal of Neurology, Neurosurgery, and Psychiatry, 66(4), 612-375.   ALFREDO Berry Scholte catarino Beck M.A. (2004.) Assessment of post-stroke quality of life in cost-effectiveness studies: The usefulness of the Barthel Index and the EuroQoL-5D. Quality of Life Research, 13, 518-93         G codes: In compliance with CMSs Claims Based Outcome Reporting, the following G-code set was chosen for this patient based on their primary functional limitation being treated: The outcome measure chosen to determine the severity of the functional limitation was the Barthel with a score of 70/100 which was correlated with the impairment scale. ? Self Care:     - CURRENT STATUS: CJ - 20%-39% impaired, limited or restricted    - GOAL STATUS: CI - 1%-19% impaired, limited or restricted    - D/C STATUS:  ---------------To be determined---------------     Occupational Therapy Evaluation Charge Determination   History Examination Decision-Making   LOW Complexity : Brief history review  LOW Complexity : 1-3 performance deficits relating to physical, cognitive , or psychosocial skils that result in activity limitations and / or participation restrictions  LOW Complexity : No comorbidities that affect functional and no verbal or physical assistance needed to complete eval tasks       Based on the above components, the patient evaluation is determined to be of the following complexity level: LOW   Pain:  Pain Scale 1: Numeric (0 - 10)  Pain Intensity 1: 0              Activity Tolerance:   VSS  Please refer to the flowsheet for vital signs taken during this treatment.   After treatment:   [x] Patient left in no apparent distress sitting up in chair  [] Patient left in no apparent distress in bed  [x] Call bell left within reach  [x] Nursing notified  [] Caregiver present  [] Bed alarm activated    COMMUNICATION/EDUCATION:   The patients plan of care was discussed with: Physical Therapist and Registered Nurse.  [] Home safety education was provided and the patient/caregiver indicated understanding. [x] Patient/family have participated as able in goal setting and plan of care. [x] Patient/family agree to work toward stated goals and plan of care. [] Patient understands intent and goals of therapy, but is neutral about his/her participation. [] Patient is unable to participate in goal setting and plan of care. This patients plan of care is appropriate for delegation to LEBRON.     Thank you for this referral.  Michele Vallecillo OT  Time Calculation: 22 mins

## 2018-04-24 NOTE — CONSULTS
Cardiothoracic Surgery Consult      Subjective:      Chief Complaint: persistent pericardial drainage    Adriana Soares is a 78 y.o. hypertensive, non diabetic, non smoking, PAFIB cau male who was referred for opinion and advise regarding recurrent  pericardial effusion post pericardiocentesis with indwelling chest tube by Dr. Syed Soto  / Eyal Colorado MD.     Patient returns to [facility] having undergone ablation with loop monitor placement and complicated by pericarial tamponade managed with pericardiocentesis. Patient had two syncope episodes at home associated with fatigue and was readmitted after  CT revealed cardiac tamponade with large fluid collection. He underwent  Echo-guided apical pericardiocentesis in the cath lab with fluoroscopic guidance 4/22/18.  yesterday was 790/ 24 hrs serosanguinous. Today 150 / 6 hrs. HH has remained stable and is improving  10.6/ 31.3    Cardiac Testing:     Echocardiogram   Left ventricle: Systolic function was normal. Ejection fraction was  estimated in the range of 55 % to 60 %. No obvious wall motion  abnormalities identified in the views obtained. Wall thickness was mildly  increased. Pericardium: A trivial pericardial effusion was identified. INDICATIONS: Follow up pericardial effusion. PROCEDURE: This was a routine study. The study included limited 2D  imaging, M-mode, limited spectral Doppler, and color Doppler. Systolic  blood pressure was 105 mmHg, at the start of the study. Diastolic blood  pressure was 55 mmHg, at the start of the study. Image quality was  adequate. LEFT VENTRICLE: Size was normal. Systolic function was normal. Ejection  fraction was estimated in the range of 55 % to 60 %. No obvious wall  motion abnormalities identified in the views obtained. Wall thickness was  mildly increased.     RIGHT VENTRICLE: The size was normal. Systolic function was normal. Wall  thickness was normal.    LEFT ATRIUM: Size was at the upper limits of normal.    RIGHT ATRIUM: Size was normal.    MITRAL VALVE: Not well visualized. AORTIC VALVE: Not well visualized. TRICUSPID VALVE: Not well visualized. PULMONIC VALVE: Not well visualized. AORTA: The root exhibited normal size. PERICARDIUM: A trivial pericardial effusion was identified. Past Medical History:   Diagnosis Date    Atrial fibrillation ([address]     CAD (coronary artery disease)     Headache     Hypertension     Hypothyroidism     [facility]roke [facility])      Past Surgical History:   Procedure Laterality Date    CARDIAC SURG PROCEDURE UNLIST  04/09/2018    cardiac ablation, implant permanent cardiac monitor-[employer]tr[employer]    CATH PERICARDIOCENTESIS  4/22/2018         HX APPENDECTOMY  1947    HX CATARACT REMOVAL  2009 x 2    HX THYROIDECTOMY  1950 Partial,    2003 Full      Social History   Substance Use Topics    Smoking status: Never Smoker    Smokeless tobacco: Never Used    Alcohol use No      No family history on file. Prior to Admission medications    Medication Sig Start Date End Date Taking? Authorizing Provider   cephALEXin (KEFLEX) 250 mg capsule Take 1 Cap by mouth three (3) times daily. 4/21/18  Yes Ector Bailey MD   benzonatate (TESSALON) 100 mg capsule Take 1 Cap by mouth three (3) times daily as needed for Cough for up to 7 days. 4/19/18 4/26/18 Yes Augusot Choudhary MD   VIT C/E/ZN/COPPR/LUTEIN/ZEAXAN (PRESERVISION AREDS 2 PO) Take  by mouth two (2) times a day. Yes Historical Provider   latanoprost (XALATAN) 0.005 % ophthalmic solution Administer 1 Drop to both eyes nightly. 2/22/18  Yes Historical Provider   aspirin 81 mg chewable tablet Take 1 Tab by mouth daily. 2/2/18  Yes Cadence Whyte MD   levothyroxine (SYNTHROID) 75 mcg tablet Take 1 Tab by mouth Daily (before breakfast). 2/2/18  Yes Cadence Whyte MD   rivaroxaban (XARELTO) 20 mg tab tablet Take 1 Tab by mouth daily (with dinner).  Indications: PREVENT THROMBOEMBOLISM IN CHRONIC ATRIAL FIBRILLATION 2/1/18 Yes Vivian Burnham MD   losartan (COZAAR) 100 mg tablet Take 100 mg by mouth daily. Yes Pat Mar MD   metoprolol succinate (TOPROL-XL) 25 mg XL tablet Take 25 mg by mouth daily. Yes Pat Mar MD   tamsulosin (FLOMAX) 0.4 mg capsule Take 0.8 mg by mouth daily. Yes Pat Mar MD   timolol (TIMOPTIC) 0.5 % ophthalmic solution Administer 1 Drop to both eyes daily. Yes Pat Mar MD       Allergies   Allergen Reactions    Chocolate Flavor Other (comments)       Recreational drug use:NO    Jaciel status or cause of death in parents and siblings if  Heart problems, stroke, or vascular disease in family members : NO    HISTORY OF NON COMPLIANCE WITH MEDICINES:NO    Prior to Admission medications    Medication Sig Start Date End Date Taking? Authorizing Provider   cephALEXin (KEFLEX) 250 mg capsule Take 1 Cap by mouth three (3) times daily. 4/21/18  Yes Carmelina Manzanares MD   benzonatate (TESSALON) 100 mg capsule Take 1 Cap by mouth three (3) times daily as needed for Cough for up to 7 days. 4/19/18 4/26/18 Yes Augusto Whipple MD   VIT C/E/ZN/COPPR/LUTEIN/ZEAXAN (PRESERVISION AREDS 2 PO) Take  by mouth two (2) times a day. Yes Historical Provider   latanoprost (XALATAN) 0.005 % ophthalmic solution Administer 1 Drop to both eyes nightly. 2/22/18  Yes Historical Provider   aspirin 81 mg chewable tablet Take 1 Tab by mouth daily. 2/2/18  Yes Vivian Burnham MD   levothyroxine (SYNTHROID) 75 mcg tablet Take 1 Tab by mouth Daily (before breakfast). 2/2/18  Yes Vivian Burnham MD   rivaroxaban (XARELTO) 20 mg tab tablet Take 1 Tab by mouth daily (with dinner). Indications: PREVENT THROMBOEMBOLISM IN CHRONIC ATRIAL FIBRILLATION 2/1/18  Yes Vivian Burnham MD   losartan (COZAAR) 100 mg tablet Take 100 mg by mouth daily. Yes Pat Mar MD   metoprolol succinate (TOPROL-XL) 25 mg XL tablet Take 25 mg by mouth daily. Yes Pat Mar MD   tamsulosin (FLOMAX) 0.4 mg capsule Take 0.8 mg by mouth daily.    Yes Pat Mar MD   timolol (TIMOPTIC) 0.5 % ophthalmic solution Administer 1 Drop to both eyes daily. Yes Phys Other, MD       REVIEW OF SYSTEMS:     [] Unable to obtain  ROS due to  []mental status change  []sedated   []intubated   [x]Total of 13 systems reviewed as follows:     Review of Systems:   Consititutional: Denies fever or chills. Eyes:  Denies use of glasses or vision problems(cataracts). ENT:  Denies hearing or swallowing difficulty. CV: Denies CP, claudication, HTN. Resp: Denies dyspnea, productive cough. : Denies dialysis or kidney problems. GI: Denies ulcers, esophageal strictures, liver problems. M/S: Denies joint or bone problems, or implanted artificial hardware. Skin: Denies varicose veins, edema. Neuro: Denies strokes, or TIAs. Psych: Denies anxiety or depression. Endocrine: Denies thyroid problems or diabetes. Heme/Lymphatic: Denies easy bruising or lymphedema. Objective:     Visit Vitals    /61 (BP 1 Location: Left arm, BP Patient Position: At rest)    Pulse 95    Temp 97.5 °F (36.4 °C)    Resp 20    Ht 6' 3\" (1.905 m)    Wt 225 lb 14.4 oz (102.5 kg)    SpO2 95%    BMI 28.24 kg/m2       EXAM:  General:  Alert, cooperative, no distress   Mouth/Throat: Teeth and gums normal.   Neck: Supple, symmetrical, trachea midline, no adenopathy, thyroid: no enlargement/tenderness/nodules, no carotid bruit and no JVD. Lungs:   Clear to auscultation bilaterally. Heart:  Regular rate and rhythm, S1, S2 normal, no murmur, click, rub or gallop. Abdomen:   Soft, non-tender. Bowel sounds normal. No masses,  No organomegaly. Extremities: Extremities normal, atraumatic, no cyanosis or edema. Pulses: 2+ and symmetric all extremities. Skin:  No rashes or lesions       Neurologic:  Gross motor and sensory apparatus intact.      Labs:   Recent Labs      04/24/18   0433   04/21/18 2015   WBC  8.4   < >  13.3*   HGB  10.6*   < >  11.2*   HCT  31.3*   < >  33.1*   PLT  280   < >  265   NA 132*   < >  127*   K  4.6   < >  4.6   BUN  14   < >  24*   CREA  0.75   < >  1.24   GLU  138*   < >  171*   INR   --    --   2.1*    < > = values in this interval not displayed. Assessment:     Principal Problem:    Pericardial tamponade (4/9/2018)    Active Problems:    Paroxysmal atrial fibrillation (HCC) (1/31/2018)      Chronic anticoagulation (1/31/2018)      Essential hypertension (1/31/2018)      S/P ablation of atrial fibrillation (4/22/2018)      Cardiac tamponade (4/22/2018)        Plan:     Treat for presumed Pericarditis: steroids/ anti-inflammatories  Monitor HH  Monitor CT drainage for amount and character. Will follow with you.  Thanks    Signed By: DONAL Valdovinos     April 24, 2018       Pt seen and examined  Discussed with Dr Cammy Vidales to aspirate pericardial drain to prevent accumulation  Add steroids   Hemodynamics stable

## 2018-04-25 ENCOUNTER — APPOINTMENT (OUTPATIENT)
Dept: GENERAL RADIOLOGY | Age: 78
DRG: 315 | End: 2018-04-25
Attending: INTERNAL MEDICINE
Payer: MEDICARE

## 2018-04-25 LAB
ALBUMIN SERPL-MCNC: 2.6 G/DL (ref 3.5–5)
ALBUMIN/GLOB SERPL: 0.9 {RATIO} (ref 1.1–2.2)
ALP SERPL-CCNC: 83 U/L (ref 45–117)
ALT SERPL-CCNC: 81 U/L (ref 12–78)
ANION GAP SERPL CALC-SCNC: 5 MMOL/L (ref 5–15)
AST SERPL-CCNC: 15 U/L (ref 15–37)
ATRIAL RATE: 118 BPM
ATRIAL RATE: 87 BPM
BASOPHILS # BLD: 0 K/UL (ref 0–0.1)
BASOPHILS NFR BLD: 0 % (ref 0–1)
BILIRUB SERPL-MCNC: 0.4 MG/DL (ref 0.2–1)
BUN SERPL-MCNC: 15 MG/DL (ref 6–20)
BUN/CREAT SERPL: 21 (ref 12–20)
CALCIUM SERPL-MCNC: 8.2 MG/DL (ref 8.5–10.1)
CALCULATED P AXIS, ECG09: 47 DEGREES
CALCULATED R AXIS, ECG10: 13 DEGREES
CALCULATED R AXIS, ECG10: 29 DEGREES
CALCULATED T AXIS, ECG11: -162 DEGREES
CALCULATED T AXIS, ECG11: 51 DEGREES
CHLORIDE SERPL-SCNC: 102 MMOL/L (ref 97–108)
CO2 SERPL-SCNC: 27 MMOL/L (ref 21–32)
CREAT SERPL-MCNC: 0.73 MG/DL (ref 0.7–1.3)
DIAGNOSIS, 93000: NORMAL
DIAGNOSIS, 93000: NORMAL
DIFFERENTIAL METHOD BLD: ABNORMAL
EOSINOPHIL # BLD: 0 K/UL (ref 0–0.4)
EOSINOPHIL NFR BLD: 0 % (ref 0–7)
ERYTHROCYTE [DISTWIDTH] IN BLOOD BY AUTOMATED COUNT: 13.5 % (ref 11.5–14.5)
GLOBULIN SER CALC-MCNC: 3 G/DL (ref 2–4)
GLUCOSE SERPL-MCNC: 147 MG/DL (ref 65–100)
HCT VFR BLD AUTO: 29.3 % (ref 36.6–50.3)
HGB BLD-MCNC: 9.9 G/DL (ref 12.1–17)
IMM GRANULOCYTES # BLD: 0.1 K/UL (ref 0–0.04)
IMM GRANULOCYTES NFR BLD AUTO: 0 % (ref 0–0.5)
LYMPHOCYTES # BLD: 0.5 K/UL (ref 0.8–3.5)
LYMPHOCYTES NFR BLD: 4 % (ref 12–49)
MAGNESIUM SERPL-MCNC: 2.2 MG/DL (ref 1.6–2.4)
MCH RBC QN AUTO: 28.4 PG (ref 26–34)
MCHC RBC AUTO-ENTMCNC: 33.8 G/DL (ref 30–36.5)
MCV RBC AUTO: 84 FL (ref 80–99)
MONOCYTES # BLD: 0.7 K/UL (ref 0–1)
MONOCYTES NFR BLD: 6 % (ref 5–13)
NEUTS SEG # BLD: 10.9 K/UL (ref 1.8–8)
NEUTS SEG NFR BLD: 90 % (ref 32–75)
NRBC # BLD: 0 K/UL (ref 0–0.01)
NRBC BLD-RTO: 0 PER 100 WBC
P-R INTERVAL, ECG05: 172 MS
PHOSPHATE SERPL-MCNC: 2.9 MG/DL (ref 2.6–4.7)
PLATELET # BLD AUTO: 305 K/UL (ref 150–400)
PMV BLD AUTO: 9.7 FL (ref 8.9–12.9)
POTASSIUM SERPL-SCNC: 4.3 MMOL/L (ref 3.5–5.1)
PROT SERPL-MCNC: 5.6 G/DL (ref 6.4–8.2)
Q-T INTERVAL, ECG07: 260 MS
Q-T INTERVAL, ECG07: 386 MS
QRS DURATION, ECG06: 100 MS
QRS DURATION, ECG06: 100 MS
QTC CALCULATION (BEZET), ECG08: 443 MS
QTC CALCULATION (BEZET), ECG08: 464 MS
RBC # BLD AUTO: 3.49 M/UL (ref 4.1–5.7)
SODIUM SERPL-SCNC: 134 MMOL/L (ref 136–145)
VENTRICULAR RATE, ECG03: 175 BPM
VENTRICULAR RATE, ECG03: 87 BPM
WBC # BLD AUTO: 12.2 K/UL (ref 4.1–11.1)

## 2018-04-25 PROCEDURE — 97530 THERAPEUTIC ACTIVITIES: CPT

## 2018-04-25 PROCEDURE — 74011250636 HC RX REV CODE- 250/636: Performed by: INTERNAL MEDICINE

## 2018-04-25 PROCEDURE — 97535 SELF CARE MNGMENT TRAINING: CPT | Performed by: OCCUPATIONAL THERAPIST

## 2018-04-25 PROCEDURE — 74011250637 HC RX REV CODE- 250/637: Performed by: INTERNAL MEDICINE

## 2018-04-25 PROCEDURE — 65610000006 HC RM INTENSIVE CARE

## 2018-04-25 PROCEDURE — 80053 COMPREHEN METABOLIC PANEL: CPT | Performed by: INTERNAL MEDICINE

## 2018-04-25 PROCEDURE — 97116 GAIT TRAINING THERAPY: CPT

## 2018-04-25 PROCEDURE — 85025 COMPLETE CBC W/AUTO DIFF WBC: CPT | Performed by: INTERNAL MEDICINE

## 2018-04-25 PROCEDURE — 97110 THERAPEUTIC EXERCISES: CPT | Performed by: OCCUPATIONAL THERAPIST

## 2018-04-25 PROCEDURE — 36415 COLL VENOUS BLD VENIPUNCTURE: CPT | Performed by: INTERNAL MEDICINE

## 2018-04-25 PROCEDURE — 83735 ASSAY OF MAGNESIUM: CPT | Performed by: INTERNAL MEDICINE

## 2018-04-25 PROCEDURE — 84100 ASSAY OF PHOSPHORUS: CPT | Performed by: INTERNAL MEDICINE

## 2018-04-25 PROCEDURE — 71045 X-RAY EXAM CHEST 1 VIEW: CPT

## 2018-04-25 PROCEDURE — 74011000250 HC RX REV CODE- 250: Performed by: INTERNAL MEDICINE

## 2018-04-25 RX ORDER — METOPROLOL TARTRATE 5 MG/5ML
5 INJECTION INTRAVENOUS
Status: DISCONTINUED | OUTPATIENT
Start: 2018-04-25 | End: 2018-04-27

## 2018-04-25 RX ADMIN — LATANOPROST 1 DROP: 50 SOLUTION OPHTHALMIC at 21:32

## 2018-04-25 RX ADMIN — METOPROLOL SUCCINATE 25 MG: 25 TABLET, EXTENDED RELEASE ORAL at 09:11

## 2018-04-25 RX ADMIN — Medication 10 ML: at 13:14

## 2018-04-25 RX ADMIN — MULTIPLE VITAMINS W/ MINERALS TAB 1 TABLET: TAB at 09:11

## 2018-04-25 RX ADMIN — Medication 10 ML: at 21:36

## 2018-04-25 RX ADMIN — TIMOLOL MALEATE 1 DROP: 5 SOLUTION/ DROPS OPHTHALMIC at 09:12

## 2018-04-25 RX ADMIN — BENZONATATE 100 MG: 100 CAPSULE ORAL at 13:13

## 2018-04-25 RX ADMIN — TAMSULOSIN HYDROCHLORIDE 0.8 MG: 0.4 CAPSULE ORAL at 09:11

## 2018-04-25 RX ADMIN — COLCHICINE 0.6 MG: 0.6 TABLET, FILM COATED ORAL at 11:09

## 2018-04-25 RX ADMIN — METOROPROLOL TARTRATE 5 MG: 5 INJECTION, SOLUTION INTRAVENOUS at 06:03

## 2018-04-25 RX ADMIN — LEVOTHYROXINE SODIUM 75 MCG: 25 TABLET ORAL at 08:17

## 2018-04-25 RX ADMIN — COLCHICINE 0.6 MG: 0.6 TABLET, FILM COATED ORAL at 18:45

## 2018-04-25 RX ADMIN — MUPIROCIN: 20 OINTMENT TOPICAL at 18:45

## 2018-04-25 RX ADMIN — Medication 10 ML: at 06:04

## 2018-04-25 RX ADMIN — BENZONATATE 100 MG: 100 CAPSULE ORAL at 21:36

## 2018-04-25 RX ADMIN — MUPIROCIN: 20 OINTMENT TOPICAL at 09:15

## 2018-04-25 RX ADMIN — METOROPROLOL TARTRATE 5 MG: 5 INJECTION, SOLUTION INTRAVENOUS at 00:07

## 2018-04-25 RX ADMIN — METHYLPREDNISOLONE SODIUM SUCCINATE 30 MG: 40 INJECTION, POWDER, FOR SOLUTION INTRAMUSCULAR; INTRAVENOUS at 08:23

## 2018-04-25 RX ADMIN — LEVOFLOXACIN 750 MG: 750 TABLET, FILM COATED ORAL at 09:11

## 2018-04-25 NOTE — PROGRESS NOTES
Cardiac Surgery     Admit Date: 2018      Plan/Recommendations/Medical Decision Making:     Diminished  usual for laying flat overnight  Continue monitoring   Steroid wean  No reason to keep in CCU  Reviewed thoughts with Abbi Chin/ Smita/Trace NP  Patient seen and reviewed with Dr. Connor Hawkins MD        Assessment:     Principal Problem:    Pericardial tamponade (2018)    Active Problems:    Paroxysmal atrial fibrillation ([address] (2018)      Chronic anticoagulation (2018)      Essential hypertension (2018)      S/P ablation of atrial fibrillation (2018)      Cardiac tamponade (2018)              Summary:     Stable hemodynamics in sinus rhythm without ectopy on no support. No chest pain or shortness of breath.  20/12 hrs overnight. 270/ 24 hrs. Objective:     Visit Vitals    /72 (BP 1 Location: Left arm, BP Patient Position: At rest)    Pulse 81    Temp 97.9 °F (36.6 °C)    Resp 13    Ht 6' 3\" (1.905 m)    Wt 225 lb 14.4 oz (102.5 kg)    SpO2 93%    BMI 28.24 kg/m2       Temp (24hrs), Av °F (36.7 °C), Min:97.5 °F (36.4 °C), Max:98.3 °F (36.8 °C)      Last 3 Recorded Weights in this Encounter    18 0115 18 0400 18 0415   Weight: 228 lb 1.6 oz (103.5 kg) 231 lb (104.8 kg) 225 lb 14.4 oz (102.5 kg)       Lab Data Reviewed: Recent Labs      18   0404   WBC  12.2*   HGB  9.9*   HCT  29.3*   PLT  305   CREA  0.73       CXR Results  (Last 48 hours)               18 0558  XR CHEST PORT Final result    Impression:  IMPRESSION: No significant change. Narrative:  INDICATION:  Follow-up pericardial effusion, with pericardial drain placed. Interval changes. EXAM: CXR Portable. FINDINGS: Portable chest shows stable appearance including pericardial drain   since yesterday. There is no apparent pneumothorax. Lungs show unchanged left   base airspace disease.  Heart size is top normal. There is no overt pulmonary edema.           04/24/18 0532  XR CHEST PORT Final result    Impression:   impression: No change. Narrative:  Clinical indication: Dyspnea. Portable AP erect view of the chest is obtained, comparison April 23. Left chest   tube and pleural reaction unchanged. Last 24hr Input/Output:    Intake/Output Summary (Last 24 hours) at 04/25/18 0909  Last data filed at 04/25/18 0842   Gross per 24 hour   Intake             1730 ml   Output             2720 ml   Net             -990 ml        Admission Weight: Last Weight   Weight: 228 lb 1.6 oz (103.5 kg) Weight: 225 lb 14.4 oz (102.5 kg)       EXAM:      Lungs:   Clear to auscultation bilaterally. Heart:  Regular rate and rhythm, S1, S2 normal, no murmur, no click, no rub      Abdomen:   Soft, non-tender. Bowel sounds present. No masses,  No organomegaly. Extremities:  No edema     Neurologic:  Gross motor and sensory apparatus intact.          Signed By: DONAL Anton

## 2018-04-25 NOTE — PROGRESS NOTES
0000 Pericardial drain irrigated as ordered with 5 ml NS. Tolerated well. 0200 Pericardial drain irrigated as ordered with 5 ml NS. Tolerated well.  0400 Pericardial drain irrigated as ordered with 5 ml NS. Tolerated well.  0600 Pericardial drain irrigated as ordered with 5 ml NS. Tolerated well. 4470 total pericardial drain output for last 12 hrs 20 mls.

## 2018-04-25 NOTE — PROGRESS NOTES
Pt continues to recover in CCU. OT and PT following. Awaiting for PT and OT recommendations.            Ric Woods, [facility], [address] [place] [address]  [phone]

## 2018-04-25 NOTE — PROGRESS NOTES
Problem: Self Care Deficits Care Plan (Adult)  Goal: *Acute Goals and Plan of Care (Insert Text)  Occupational Therapy Goals  Initiated 4/24/2018  1. Patient will perform standing ADLs x 5 minutes without seated rest break with independence within 7 day(s). 2.  Patient will perform upper body dressing and lower body dressing with independence within 7 day(s). 3.  Patient will gather materials for ADL task with independence within 7 day(s). 4.  Patient will perform toilet transfers in bathroom with independence within 7 day(s). 5.  Patient will perform all aspects of toileting with independence within 7 day(s). Occupational Therapy TREATMENT  Patient: Willie Kaba (52 y.o. male)  Date: 4/25/2018  Diagnosis: Pericardial Effusion  Syncope  Cardiac tamponade Pericardial tamponade       Precautions:  fall  Chart, occupational therapy assessment, plan of care, and goals were reviewed. ASSESSMENT:  Pt is making steady progress towards goals. He remains limited by decreased endurance and safety. Educated pt on energy conservation techniques and UE exer program and he demonstrated good understanding. Recommend HH therapy at discharge. Progression toward goals:  [x]       Improving appropriately and progressing toward goals  []       Improving slowly and progressing toward goals  []       Not making progress toward goals and plan of care will be adjusted     PLAN:  Patient continues to benefit from skilled intervention to address the above impairments. Continue treatment per established plan of care. Discharge Recommendations:  Home Health  Further Equipment Recommendations for Discharge:  TBD     SUBJECTIVE:   Patient stated I am just weak.     OBJECTIVE DATA SUMMARY:   Cognitive/Behavioral Status:  Neurologic State: Alert; Appropriate for age  Orientation Level: Oriented X4  Cognition: Appropriate for age attention/concentration; Appropriate safety awareness; Follows commands  Perception: Appears intact  Perseveration: No perseveration noted  Safety/Judgement: Awareness of environment; Fall prevention; Insight into deficits    Functional Mobility and Transfers for ADLs:  Bed Mobility:  Scooting: Supervision    Transfers:  Sit to Stand: Contact guard assistance; Additional time;Assist x1 (A for forward wt shift)     Bed to Chair: Contact guard assistance; Additional time;Assist x1 (HHA)    Balance:  Sitting: Intact  Standing: Impaired  Standing - Static: Fair;Constant support  Standing - Dynamic : Fair    ADL Intervention:  Patient instructed and indicated understanding energy conservation techniques to increase independence and safety during all ADLs for end goal of returning back home. Provided instruction body is like a jar of marbles, marbles represent energy, at end of day need as many marbles as possible to obtain a good night sleep, REM sleep is when the body repairs itself. This ensures a full jar of marbles, full of energy when wake up to start a new day. Use energy conservation techniques during ADLs so can increase participation in life activities patient prefers, to ensure more frequent good days. If having a bad day, evaluate tasks completed day before and re-plan how to save energy to complete same tasks, for example if going grocery shopping do not complete full bathing/dressing/grooming. Patient indicated understanding by stating tasks already completing to save energy ie sitting to don all clothing. Lower Body Dressing Assistance  Socks: Minimum assistance (A to thread socks onto toes)  Leg Crossed Method Used: Yes  Position Performed: Seated edge of bed  Cues: Don;Physical assistance;Verbal cues provided;Visual cues provided    Cognitive Retraining  Safety/Judgement: Awareness of environment; Fall prevention; Insight into deficits    Therapeutic Exercises:   Educated pt on benefits of exer to increase his strength and endurance for all functional tasks.   Pt then performed 2 sets 5 reps of chair push-up with rest break after each set. Breakfast came, so pt verbally and visually educated on BUE AROM exer to perform daily. Pain:  Pain Scale 1: Numeric (0 - 10)  Pain Intensity 1: 0              Activity Tolerance:   Fair  Please refer to the flowsheet for vital signs taken during this treatment.   After treatment:   [x] Patient left in no apparent distress sitting up in chair  [] Patient left in no apparent distress in bed  [x] Call bell left within reach  [x] Nursing notified  [] Caregiver present  [] Bed alarm activated    COMMUNICATION/COLLABORATION:   The patients plan of care was discussed with: Physical Therapist and Registered Nurse    Freddy Genao OT  Time Calculation: 23 mins

## 2018-04-25 NOTE — PROGRESS NOTES
Problem: Mobility Impaired (Adult and Pediatric)  Goal: *Acute Goals and Plan of Care (Insert Text)  Physical Therapy Goals  Initiated 4/24/2018  1. Patient will move from supine to sit and sit to supine , scoot up and down and roll side to side in bed with independence within 7 day(s). 2.  Patient will transfer from bed to chair and chair to bed with independence using the least restrictive device within 7 day(s). 3.  Patient will perform sit to stand with independence within 7 day(s). 4.  Patient will ambulate with independence for 200 feet with the least restrictive device within 7 day(s). 5.  Patient will ascend/descend 16 stairs with 1 handrail(s) with modified independence within 7 day(s). physical Therapy TREATMENT  Patient: Atfab Portillo (9 y.o. male)  Date: 4/25/2018  Diagnosis: Pericardial Effusion  Syncope  Cardiac tamponade Pericardial tamponade       Precautions:    Chart, physical therapy assessment, plan of care and goals were reviewed. ASSESSMENT:  Patient making progress towards goals with improved gait tolerance and stable vitals during gait today. He presented with mild anxiety regarding mobility and preferred to ambulate with a RW vs without. Mild to moderate fatigue when returned to bedside chair after 120'. Discharge recommendations TBD but anticipate continued progress towards discharge home with HHPT vs without services. Progression toward goals:  [x]    Improving appropriately and progressing toward goals  []    Improving slowly and progressing toward goals  []    Not making progress toward goals and plan of care will be adjusted     PLAN:  Patient continues to benefit from skilled intervention to address the above impairments. Continue treatment per established plan of care. Discharge Recommendations: To Be Determined  Further Equipment Recommendations for Discharge:  to be determined       SUBJECTIVE:   Patient stated We just got to the chair yesterday.     OBJECTIVE DATA SUMMARY:   Critical Behavior:  Neurologic State: Alert, Appropriate for age  Orientation Level: Oriented X4  Cognition: Appropriate for age attention/concentration, Appropriate safety awareness, Follows commands  Safety/Judgement: Awareness of environment, Fall prevention, Insight into deficits  Functional Mobility Training:  Bed Mobility:     Supine to Sit: Supervision     Scooting: Supervision        Transfers:  Sit to Stand: Contact guard assistance           Bed to Chair: Contact guard assistance                    Balance:  Sitting: Intact  Standing: Impaired  Standing - Static: Fair;Constant support  Standing - Dynamic : Fair  Ambulation/Gait Training:  Distance (ft): 120 Feet (ft)  Assistive Device: Gait belt;Walker, rolling  Ambulation - Level of Assistance: Contact guard assistance        Gait Abnormalities: Decreased step clearance (flexed posture)        Base of Support: Narrowed     Speed/Susannah: Shuffled  Neuro Re-Education:    Therapeutic Exercises:     Pain:  Pain Scale 1: Numeric (0 - 10)  Pain Intensity 1: 0              Activity Tolerance:     Please refer to the flowsheet for vital signs taken during this treatment.   After treatment:   [x]    Patient left in no apparent distress sitting up in chair  []    Patient left in no apparent distress in bed  [x]    Call bell left within reach  [x]    Nursing notified  []    Caregiver present  []    Bed alarm activated    COMMUNICATION/COLLABORATION:   The patients plan of care was discussed with: Registered Nurse    Emelia Kenny PT, DPT   Time Calculation: 24 mins

## 2018-04-25 NOTE — PROGRESS NOTES
Bedside shift change report given to Rachel Baez (oncoming nurse) by Blessing Calvert RN (offgoing nurse). Report included the following information SBAR.     0800: AM assessment complete. 4021: Dr. Alexander Carlson and DONAL Brantley in to see pt.  0900: OT in room. Pt up to chair with one assist.   1040: Pt back in bed per his request.   1145: PT in working with pt. Pt up walking in the hallway. Placed up in chair. 1215: Reassessment complete. No changes at this time. 1315: Pt back in bed per his request.  1431: Pt up in chair;  1600: Re assessment complete. Pt returned to bed. 1845: Pt to chair for dinner. 1900: Bedside shift change report given to Julius Pizano RN (oncoming nurse) by  Aletha Arias RN (offgoing nurse). Report included the following information SBAR.

## 2018-04-25 NOTE — PROGRESS NOTES
PULMONARY/CRITICAL CARE/SLEEP MEDICINE    Physician Consultation Note    Name: Patrick Garber   : 1940   MRN: [id number]   Date: 2018        Assessment:     Due to pt having Pericardial drain needs to stay in the ICU, this is evidently hospital policy. Cardiac tamponade and with syncope on presentation, status post therapeutic pericardiocentesis   Pericardial drainage nearly 700-1000 cc per day. Thankfully over the last 24 hrs the drainage seems to be decreasing. This may be due to pericarditis? ? Discussed with Mr. Brittani Diaz and Dr. Mary Hernadez is on Colchicine, Solumedrol. Hgb has been stable. Hemodynamics are stable. Leukocytosis, possible stress induced versus infection  Abnormal chest x-ray with interstitial edema and small left effusion  History of atrial fibrillation and chronic anticoagulation  Other medical problems per chart  Feels weak. Remains acutely ill  Discussed with  Brittani Emily, nurse and pt this am.      Recommendations:   Cardiac management per cardiology and CT surgery  IV fluids  Abx- empiric Levaquin  Incentive spirometer  SCDs  Pain control, he requests Norco  Monitor WBC count and CXR      Subjective:   Consult Note: 2018   Requesting Physician: Dr. Shelby Gomez  Reason for consult: Pneumonia    Last 24 hrs: Pt has mild chest pain at site of pericardial drain. NO coughing, no back pain. No abdominal. He feels that his breathing is pretty comfortable today. NO headaches. Tolerating po intake. NO leg pain. No issues with constipation or diarrhea. Did not have any dizziness. Rest of ROS is negative of 10 systems except as above. Medical records and data reviewed. Patient is a 78 y.o. male who was brought to the hospital with syncope. Patient underwent ablation and implantation of a loop monitor on  which was complicated by pericardial tamponade managed with pericardiocentesis.  After discharge she developed 2 episodes of syncope, ongoing fatigue and increased abdominal girth and was brought back to the emergency room. CT of the chest revealed large pericardial effusion. He was [facility] where clinical examination was compatible with suspected tamponade at. He underwent emergent pericardiocentesis and is being observed in the Intensive Care Unit postprocedure. He has elevated white count but no fever. He has had cough productive of scant amount of discolored expectoration. He reports chest discomfort which is not pleuritic in nature. Chest x-ray shows better pulmonary vascular congestion, mild cardiomegaly and a small left effusion. He is on Levaquin and sputum culture is pending. Review of Systems:     A comprehensive 12 system review of systems was negative except for as documented in HPI      Active Problem List:     Problem List  Date Reviewed: 4/19/2018          Codes Class    S/P ablation of atrial fibrillation ICD-10-CM: Z98.890, Z86.79  ICD-9-CM: V45.89         Cardiac tamponade ICD-10-CM: I31.4  ICD-9-CM: 423.3         A-fib (HCC) ICD-10-CM: I48.91  ICD-9-CM: 427.31         * (Principal)Pericardial tamponade ICD-10-CM: I31.4  ICD-9-CM: 423.3         TIA (transient ischemic attack) ICD-10-CM: G45.9  ICD-9-CM: 435.9         Broca's aphasia ICD-10-CM: R47.01  ICD-9-CM: 750. 3         Paroxysmal atrial fibrillation (HCC) (Chronic) ICD-10-CM: I48.0  ICD-9-CM: 427.31         Chronic anticoagulation (Chronic) ICD-10-CM: Z79.01  ICD-9-CM: M45.90         Systolic CHF, chronic (HCC) (Chronic) ICD-10-CM: I50.22  ICD-9-CM: 428.22, 428.0         Acquired hypothyroidism (Chronic) ICD-10-CM: E03.9  ICD-9-CM: 244.9         Essential hypertension (Chronic) ICD-10-CM: I10  ICD-9-CM: 401.9               Past Medical History:      has a past medical history of Atrial fibrillation ([address] CAD (coronary artery disease); Headache; Hypertension; Hypothyroidism; and Stroke [facility]).     Past Surgical History:      has a past surgical history that includes hx appendectomy (1947); hx thyroidectomy (1950 Partial,    2003 Full); hx cataract removal (2009 x 2); pr cardiac surg procedure unlist (04/09/2018); and cath pericardiocentesis (4/22/2018). Home Medications:     Prior to Admission medications    Medication Sig Start Date End Date Taking? Authorizing Provider   cephALEXin (KEFLEX) 250 mg capsule Take 1 Cap by mouth three (3) times daily. 4/21/18  Yes Obdulia Noyola MD   benzonatate (TESSALON) 100 mg capsule Take 1 Cap by mouth three (3) times daily as needed for Cough for up to 7 days. 4/19/18 4/26/18 Yes Augusto Macias MD   VIT C/E/ZN/COPPR/LUTEIN/ZEAXAN (PRESERVISION AREDS 2 PO) Take  by mouth two (2) times a day. Yes Historical Provider   latanoprost (XALATAN) 0.005 % ophthalmic solution Administer 1 Drop to both eyes nightly. 2/22/18  Yes Historical Provider   aspirin 81 mg chewable tablet Take 1 Tab by mouth daily. 2/2/18  Yes uYe Garcia MD   levothyroxine (SYNTHROID) 75 mcg tablet Take 1 Tab by mouth Daily (before breakfast). 2/2/18  Yes Yue Garcia MD   rivaroxaban (XARELTO) 20 mg tab tablet Take 1 Tab by mouth daily (with dinner). Indications: PREVENT THROMBOEMBOLISM IN CHRONIC ATRIAL FIBRILLATION 2/1/18  Yes Yue Garcia MD   losartan (COZAAR) 100 mg tablet Take 100 mg by mouth daily. Yes Pat aMr MD   metoprolol succinate (TOPROL-XL) 25 mg XL tablet Take 25 mg by mouth daily. Yes Pat Mar MD   tamsulosin (FLOMAX) 0.4 mg capsule Take 0.8 mg by mouth daily. Yes Pat Mar MD   timolol (TIMOPTIC) 0.5 % ophthalmic solution Administer 1 Drop to both eyes daily. Yes Pat Mar MD       Allergies/Social/Family History: Allergies   Allergen Reactions    Chocolate Flavor Other (comments)      Social History   Substance Use Topics    Smoking status: Never Smoker    Smokeless tobacco: Never Used    Alcohol use No      No family history on file.          Objective:   Vital Signs:  Visit Vitals    /72 (BP 1 Location: Left arm, BP Patient Position: At rest)    Pulse 81    Temp 97.9 °F (36.6 °C)    Resp 13    Ht 6' 3\" (1.905 m)    Wt 102.5 kg (225 lb 14.4 oz)    SpO2 93%    BMI 28.24 kg/m2    O2 Flow Rate (L/min): 2 l/min O2 Device: Room air Temp (24hrs), Av °F (36.7 °C), Min:97.5 °F (36.4 °C), Max:98.3 °F (36.8 °C)           Intake/Output:     Intake/Output Summary (Last 24 hours) at 18 1148  Last data filed at 18 1046   Gross per 24 hour   Intake             1490 ml   Output             2695 ml   Net            -1205 ml       Physical Exam:   General:  Alert, cooperative, no distress, appears stated age. Head:  Normocephalic, without obvious abnormality, atraumatic. Eyes:  Conjunctivae/corneas clear. PERRL   Neck: Supple, symmetrical, no adenopathy, no carotid bruit and no JVD. Lungs:   Decreased BS at bases but clear and breathing comfortably. Chest wall:  No tenderness or deformity. Has pericardial drain in place. Heart:  Regular rate and rhythm, S1-S2 normal, no murmur, no click, rub or gallop. Abdomen:   Soft, non-tender. Bowel sounds present. No masses,  No organomegaly. Extremities: Atraumatic, no cyanosis or edema. Pulses: Palpable   Skin: No rashes or lesions   Neurologic: Grossly nonfocal, motor strength intact. Psych: intact, no overt anxiety or depression. LABS AND  DATA: Personally reviewed  Recent Labs      18   0404  18   WBC  12.2*  8.4   HGB  9.9*  10.6*   HCT  29.3*  31.3*   PLT  305  280     Recent Labs      184  18   NA  134*  132*   K  4.3  4.6   CL  102  99   CO2  27  26   BUN  15  14   CREA  0.73  0.75   GLU  147*  138*   CA  8.2*  8.1*   MG  2.2  2.2   PHOS  2.9  3.4     Recent Labs      18   0404  18   SGOT  15  21   AP  83  87   TP  5.6*  5.8*   ALB  2.6*  2.6*   GLOB  3.0  3.2     No results for input(s): INR, PTP, APTT in the last 72 hours.     No lab exists for component: INREXT, INREXT   No results for input(s): PHI, PCO2I, PO2I, FIO2I in the last 72 hours. No results for input(s): CPK, CKMB, TROIQ, BNPP in the last 72 hours. MEDS: Reviewed    Chest Imaging: personally reviewed images and report checked  4-25-18: EXAM: CXR Portable.     FINDINGS: Portable chest shows stable appearance including pericardial drain  since yesterday. There is no apparent pneumothorax. Lungs show unchanged left  base airspace disease. Heart size is top normal. There is no overt pulmonary  edema.     IMPRESSION: No significant change. Tele- reviewed    Medical decision making:   I have reviewed the flowsheet and previous day's notes  Acute or chronic illness that poses a threat to life or bodily function  Review and order of Clinical lab tests  Review and Order of Radiology tests  Independent visualization of Image  Reviewed high risk medications/sedatives      Thank you for allowing me to participate in this patient's care.     Vaibhav Call MD

## 2018-04-25 NOTE — PROGRESS NOTES
Cardiac Electrophysiology Progress Note            Aditya Lugo, [address] [facility]  [phone]    4/25/2018 8:38 AM    Admit Date: 4/21/2018    Admit Diagnosis: Pericardial Effusion  Syncope  Cardiac tamponade    Subjective:     Abhijit Samayoa   reports chest pain, dyspnea, palpitations, syncope, lower extremity edema. Notes left lateral chest wall discomfort with deep inspiration.      Visit Vitals    /72 (BP 1 Location: Left arm, BP Patient Position: At rest)    Pulse 81    Temp 97.9 °F (36.6 °C)    Resp 13    Ht 6' 3\" (1.905 m)    Wt 225 lb 14.4 oz (102.5 kg)    SpO2 93%    BMI 28.24 kg/m2     Current Facility-Administered Medications   Medication Dose Route Frequency    levoFLOXacin (LEVAQUIN) tablet 750 mg  750 mg Oral Q24H    colchicine tablet 0.6 mg  0.6 mg Oral BID    metoprolol (LOPRESSOR) injection 5 mg  5 mg IntraVENous Q6H    mupirocin (BACTROBAN) 2 % ointment   Both Nostrils BID    benzonatate (TESSALON) capsule 100 mg  100 mg Oral TID PRN    latanoprost (XALATAN) 0.005 % ophthalmic solution 1 Drop  1 Drop Both Eyes QHS    levothyroxine (SYNTHROID) tablet 75 mcg  75 mcg Oral ACB    metoprolol succinate (TOPROL-XL) XL tablet 25 mg  25 mg Oral DAILY    tamsulosin (FLOMAX) capsule 0.8 mg  0.8 mg Oral DAILY    timolol (TIMOPTIC) 0.5 % ophthalmic solution 1 Drop  1 Drop Both Eyes DAILY    multivitamin, tx-iron-ca-min (THERA-M w/ IRON) tablet 1 Tab  1 Tab Oral DAILY    sodium chloride (NS) flush 5-10 mL  5-10 mL IntraVENous Q8H    sodium chloride (NS) flush 5-10 mL  5-10 mL IntraVENous PRN    acetaminophen (TYLENOL) solution 650 mg  650 mg Oral Q4H PRN    chlorpheniramine-HYDROcodone (TUSSIONEX) oral suspension 5 mL  5 mL Oral Q12H PRN    HYDROcodone-acetaminophen (NORCO)  mg tablet 1 Tab  1 Tab Oral Q4H PRN         Objective:      Visit Vitals    /72 (BP 1 Location: Left arm, BP Patient Position: At rest)    Pulse 81    Temp 97.9 °F (36.6 °C)    Resp 13    Ht 6' 3\" (1.905 m)    Wt 225 lb 14.4 oz (102.5 kg)    SpO2 93%    BMI 28.24 kg/m2       Physical Exam:  Abdomen: soft, non-tender  Extremities: extremities normal  Heart: regular rate and rhythm  Lungs: clear to auscultation bilaterally  Pulses: 2+ and symmetric    Data Review:   Labs:    Recent Labs      04/25/18   0404  04/24/18   0433  04/23/18   1543  04/23/18   0418   WBC  12.2*  8.4   --   8.7   HGB  9.9*  10.6*  10.1*  8.9*   HCT  29.3*  31.3*  30.9*  26.9*   PLT  305  280   --   249     Recent Labs      04/25/18   0404  04/24/18   0433  04/23/18   0418  04/22/18   0850   NA  134*  132*  136  131*   K  4.3  4.6  4.5  4.8   CL  102  99  106  99   CO2  27  26  27  26   GLU  147*  138*  96  110*   BUN  15  14  16  22*   CREA  0.73  0.75  0.69*  0.94   CA  8.2*  8.1*  7.3*  8.1*   MG  2.2  2.2  2.1   --    PHOS  2.9  3.4  2.1*   --    ALB  2.6*  2.6*   --   2.7*   TBILI  0.4  0.4   --   0.6   SGOT  15  21   --   62*   ALT  81*  103*   --   157*       No results for input(s): TROIQ, CPK, CKMB in the last 72 hours. Intake/Output Summary (Last 24 hours) at 04/25/18 0838  Last data filed at 04/25/18 0700   Gross per 24 hour   Intake             1730 ml   Output             2770 ml   Net            -1040 ml        Telemetry: Sinus rhythm ventricular rate 88. Assessment:     Principal Problem:    Pericardial tamponade (4/9/2018)    Active Problems:    Paroxysmal atrial fibrillation ([address] (1/31/2018)      Chronic anticoagulation (1/31/2018)      Essential hypertension (1/31/2018)      S/P ablation of atrial fibrillation (4/22/2018)      Cardiac tamponade (4/22/2018)        Plan:     Burke Siemens is S/P Cardiac tamponade and with syncope on presentation, S/P therapeutic pericardiocentesis. Has remained in sinus off cardizem drip. H/H stable. His pericardial drain with 270cc in last 24 hours; Per CS goal is < 120 cc in 24 hours. Bps stable. May transfer to [facility] today.  Will continue to follow.         CURTIS Schmitz MD, [facility]  4/25/2018  8:38 AM

## 2018-04-25 NOTE — PROGRESS NOTES
Visited [employer] patient in CCU to provide [employer]. His wife received also. Shared prayer aloud. Will plan to return tomorrow.   SHANE Roberts, [facility], [facility]    [address] Paging Service  [phone]

## 2018-04-26 PROCEDURE — 65610000006 HC RM INTENSIVE CARE

## 2018-04-26 PROCEDURE — 74011250637 HC RX REV CODE- 250/637: Performed by: INTERNAL MEDICINE

## 2018-04-26 PROCEDURE — 97116 GAIT TRAINING THERAPY: CPT

## 2018-04-26 PROCEDURE — 74011250636 HC RX REV CODE- 250/636: Performed by: INTERNAL MEDICINE

## 2018-04-26 RX ADMIN — LATANOPROST 1 DROP: 50 SOLUTION OPHTHALMIC at 21:01

## 2018-04-26 RX ADMIN — LEVOTHYROXINE SODIUM 75 MCG: 25 TABLET ORAL at 07:47

## 2018-04-26 RX ADMIN — HYDROCODONE POLISTIREX AND CHLORPHENIRAMINE POLISTIREX 5 ML: 10; 8 SUSPENSION, EXTENDED RELEASE ORAL at 16:49

## 2018-04-26 RX ADMIN — BENZONATATE 100 MG: 100 CAPSULE ORAL at 23:25

## 2018-04-26 RX ADMIN — COLCHICINE 0.6 MG: 0.6 TABLET, FILM COATED ORAL at 19:22

## 2018-04-26 RX ADMIN — TAMSULOSIN HYDROCHLORIDE 0.8 MG: 0.4 CAPSULE ORAL at 08:51

## 2018-04-26 RX ADMIN — METOPROLOL SUCCINATE 25 MG: 25 TABLET, EXTENDED RELEASE ORAL at 08:52

## 2018-04-26 RX ADMIN — MUPIROCIN: 20 OINTMENT TOPICAL at 08:52

## 2018-04-26 RX ADMIN — METHYLPREDNISOLONE SODIUM SUCCINATE 20 MG: 40 INJECTION, POWDER, FOR SOLUTION INTRAMUSCULAR; INTRAVENOUS at 10:59

## 2018-04-26 RX ADMIN — Medication 10 ML: at 14:55

## 2018-04-26 RX ADMIN — Medication 10 ML: at 21:01

## 2018-04-26 RX ADMIN — Medication 10 ML: at 06:15

## 2018-04-26 RX ADMIN — COLCHICINE 0.6 MG: 0.6 TABLET, FILM COATED ORAL at 08:52

## 2018-04-26 RX ADMIN — BENZONATATE 100 MG: 100 CAPSULE ORAL at 08:51

## 2018-04-26 RX ADMIN — MULTIPLE VITAMINS W/ MINERALS TAB 1 TABLET: TAB at 08:51

## 2018-04-26 RX ADMIN — TIMOLOL MALEATE 1 DROP: 5 SOLUTION/ DROPS OPHTHALMIC at 08:53

## 2018-04-26 RX ADMIN — LEVOFLOXACIN 750 MG: 750 TABLET, FILM COATED ORAL at 08:51

## 2018-04-26 NOTE — PROGRESS NOTES
Cardiac Surgery     Admit Date: 2018      Plan/Recommendations/Medical Decision Making:     Diminished    Continue monitoring , may be ready for DC tomorrow  No reason to keep in CCU  Reviewed thoughts with Abbi Chin/ Smita/Trace NP  Patient seen and reviewed with Dr. Harry Tapia MD        Assessment:     Principal Problem:    Pericardial tamponade (2018)    Active Problems:    Paroxysmal atrial fibrillation ([address] (2018)      Chronic anticoagulation (2018)      Essential hypertension (2018)      S/P ablation of atrial fibrillation (2018)      Cardiac tamponade (2018)              Summary:     Stable hemodynamics in sinus rhythm without ectopy on no support. No chest pain or shortness of breath.  55/12 hrs overnight. 215/ 24 hrs. No nausea or diarrhea with colchicine    Objective:     Visit Vitals    /79    Pulse 85    Temp 97.6 °F (36.4 °C)    Resp 14    Ht 6' 3\" (1.905 m)    Wt 228 lb 8 oz (103.6 kg)    SpO2 95%    BMI 28.56 kg/m2       Temp (24hrs), Av.1 °F (36.7 °C), Min:97.6 °F (36.4 °C), Max:98.5 °F (36.9 °C)      Last 3 Recorded Weights in this Encounter    18 0400 18 0415 18 1141   Weight: 231 lb (104.8 kg) 225 lb 14.4 oz (102.5 kg) 228 lb 8 oz (103.6 kg)       Lab Data Reviewed:   Recent Labs      18   0404   WBC  12.2*   HGB  9.9*   HCT  29.3*   PLT  305   CREA  0.73       CXR Results  (Last 48 hours)               18 0558  XR CHEST PORT Final result    Impression:  IMPRESSION: No significant change. Narrative:  INDICATION:  Follow-up pericardial effusion, with pericardial drain placed. Interval changes. EXAM: CXR Portable. FINDINGS: Portable chest shows stable appearance including pericardial drain   since yesterday. There is no apparent pneumothorax. Lungs show unchanged left   base airspace disease. Heart size is top normal. There is no overt pulmonary   edema.                  Last 24hr Input/Output:    Intake/Output Summary (Last 24 hours) at 04/26/18 0755  Last data filed at 04/26/18 0532   Gross per 24 hour   Intake             1920 ml   Output             3065 ml   Net            -1145 ml        Admission Weight: Last Weight   Weight: 228 lb 1.6 oz (103.5 kg) Weight: 228 lb 8 oz (103.6 kg)       EXAM:      Lungs:   Clear to auscultation bilaterally. Heart:  Regular rate and rhythm, S1, S2 normal, no murmur, no click, no rub      Abdomen:   Soft, non-tender. Bowel sounds present. No masses,  No organomegaly. Extremities:  No edema     Neurologic:  Gross motor and sensory apparatus intact.          Signed By: DONAL Salgado

## 2018-04-26 NOTE — PROGRESS NOTES
Cardiac Electrophysiology Progress Note            [address] [address]  [phone]    4/26/2018 8:43 AM    Admit Date: 4/21/2018    Admit Diagnosis: Pericardial Effusion  Syncope  Cardiac tamponade    Subjective:     Benton Loaiza   denies chest pain, dyspnea, palpitations, orthopnea, lower extremity edema. Admits to some chest pressure around drain site with deep inspiration.  Up in chair eating breakfast.     Visit Vitals    /79    Pulse 85    Temp 97.6 °F (36.4 °C)    Resp 14    Ht 6' 3\" (1.905 m)    Wt 228 lb 8 oz (103.6 kg)    SpO2 95%    BMI 28.56 kg/m2     Current Facility-Administered Medications   Medication Dose Route Frequency    metoprolol (LOPRESSOR) injection 5 mg  5 mg IntraVENous Q6H PRN    levoFLOXacin (LEVAQUIN) tablet 750 mg  750 mg Oral Q24H    colchicine tablet 0.6 mg  0.6 mg Oral BID    mupirocin (BACTROBAN) 2 % ointment   Both Nostrils BID    benzonatate (TESSALON) capsule 100 mg  100 mg Oral TID PRN    latanoprost (XALATAN) 0.005 % ophthalmic solution 1 Drop  1 Drop Both Eyes QHS    levothyroxine (SYNTHROID) tablet 75 mcg  75 mcg Oral ACB    metoprolol succinate (TOPROL-XL) XL tablet 25 mg  25 mg Oral DAILY    tamsulosin (FLOMAX) capsule 0.8 mg  0.8 mg Oral DAILY    timolol (TIMOPTIC) 0.5 % ophthalmic solution 1 Drop  1 Drop Both Eyes DAILY    multivitamin, tx-iron-ca-min (THERA-M w/ IRON) tablet 1 Tab  1 Tab Oral DAILY    sodium chloride (NS) flush 5-10 mL  5-10 mL IntraVENous Q8H    sodium chloride (NS) flush 5-10 mL  5-10 mL IntraVENous PRN    acetaminophen (TYLENOL) solution 650 mg  650 mg Oral Q4H PRN    chlorpheniramine-HYDROcodone (TUSSIONEX) oral suspension 5 mL  5 mL Oral Q12H PRN    HYDROcodone-acetaminophen (NORCO)  mg tablet 1 Tab  1 Tab Oral Q4H PRN         Objective:      Visit Vitals    /79    Pulse 85    Temp 97.6 °F (36.4 °C)    Resp 14    Ht 6' 3\" (1.905 m)    Wt 228 lb 8 oz (103.6 kg)    SpO2 95%  BMI 28.56 kg/m2       Physical Exam:  Abdomen: soft, non-tender  Extremities: extremities normal  Heart: regular rate and rhythm  Lungs: clear to auscultation bilaterally  Pulses: 2+ and symmetric    Data Review:   Labs:    Recent Labs      04/25/18   0404  04/24/18   0433  04/23/18   1543   WBC  12.2*  8.4   --    HGB  9.9*  10.6*  10.1*   HCT  29.3*  31.3*  30.9*   PLT  305  280   --      Recent Labs      04/25/18   0404  04/24/18   0433   NA  134*  132*   K  4.3  4.6   CL  102  99   CO2  27  26   GLU  147*  138*   BUN  15  14   CREA  0.73  0.75   CA  8.2*  8.1*   MG  2.2  2.2   PHOS  2.9  3.4   ALB  2.6*  2.6*   TBILI  0.4  0.4   SGOT  15  21   ALT  81*  103*       No results for input(s): TROIQ, CPK, CKMB in the last 72 hours. Intake/Output Summary (Last 24 hours) at 04/26/18 0843  Last data filed at 04/26/18 0532   Gross per 24 hour   Intake             1420 ml   Output             2915 ml   Net            -1495 ml        Telemetry: sinus rhythm, ventricular raet 96    Assessment:     Principal Problem:    Pericardial tamponade (4/9/2018)    Active Problems:    Paroxysmal atrial fibrillation ([address] (1/31/2018)      Chronic anticoagulation (1/31/2018)      Essential hypertension (1/31/2018)      S/P ablation of atrial fibrillation (4/22/2018)      Cardiac tamponade (4/22/2018)        Plan:     Linda Celeste is S/P Cardiac tamponade and with syncope on presentation, S/P therapeutic pericardiocentesis. Has remained in sinus off cardizem drip. BPs stable. His pericardial drain with 215cc out in last 24 hours. Will plan on pulling pericardial drain tomorrow if he continues to do well.      CURTIS Johnston MD, [facility]  4/26/2018  8:43 AM

## 2018-04-26 NOTE — PROGRESS NOTES
PULMONARY/CRITICAL CARE/SLEEP MEDICINE    Physician Consultation Note    Name: Darryl Bonilla   : 1940   MRN: [id number]   Date: 2018        Assessment:     Due to pt having Pericardial drain needs to stay in the ICU (hospital policy on this was noted), this is evidently hospital policy. Cardiac tamponade and with syncope on presentation, status post therapeutic pericardiocentesis   Pericarditis? ? Discussed with  Héctormorelia Jackson and Dr. Nat Duarte is on Colchicine, Solumedrol. Hgb has been stable. Hemodynamics are stable. Has been decreasing over last 2 days. Leukocytosis, possible stress induced versus infection  Abnormal chest x-ray with interstitial edema and small left effusion  History of atrial fibrillation and chronic anticoagulation  Other medical problems per chart  Feels weak. Remains acutely ill  Discussed with Mr. Héctor Jackson, nurse and pt this am.      Recommendations:   Cardiac management per cardiology and CT surgery  IV fluids  Abx- empiric Levaquin  Has been working with PT and OT. Incentive spirometer  SCDs  Pain control, he requests Norco  Monitor WBC count and CXR  Needs ICU while pericardial drain in place. Subjective:   Consult Note: 2018   Requesting Physician: Dr. Za White  Reason for consult: Pneumonia    Last 24 hrs: Pt has mild chest pain at site of pericardial drain. Has been coughing, no back pain. No abdominal. He feels that his breathing is pretty comfortable today. NO headaches. Tolerating po intake. NO leg pain. No issues with constipation or diarrhea. NO issues with fevers, chills. Did not have any dizziness. Rest of ROS is negative of 10 systems except as above. Medical records and data reviewed. Patient is a 78 y.o. male who was brought to the hospital with syncope. Patient underwent ablation and implantation of a loop monitor on  which was complicated by pericardial tamponade managed with pericardiocentesis.  After discharge she developed 2 episodes of syncope, ongoing fatigue and increased abdominal girth and was brought back to the emergency room. CT of the chest revealed large pericardial effusion. He was transferred back to [facility] where clinical examination was compatible with suspected tamponade at. He underwent emergent pericardiocentesis and is being observed in the Intensive Care Unit postprocedure. He has elevated white count but no fever. He has had cough productive of scant amount of discolored expectoration. He reports chest discomfort which is not pleuritic in nature. Chest x-ray shows better pulmonary vascular congestion, mild cardiomegaly and a small left effusion. He is on Levaquin and sputum culture is pending. Review of Systems:     A comprehensive 12 system review of systems was negative except for as documented in HPI      Active Problem List:     Problem List  Date Reviewed: 4/19/2018          Codes Class    S/P ablation of atrial fibrillation ICD-10-CM: Z98.890, Z86.79  ICD-9-CM: V45.89         Cardiac tamponade ICD-10-CM: I31.4  ICD-9-CM: 423.3         A-fib (HCC) ICD-10-CM: I48.91  ICD-9-CM: 427.31         * (Principal)Pericardial tamponade ICD-10-CM: I31.4  ICD-9-CM: 423.3         TIA (transient ischemic attack) ICD-10-CM: G45.9  ICD-9-CM: 435.9         Broca's aphasia ICD-10-CM: R47.01  ICD-9-CM: 047. 3         Paroxysmal atrial fibrillation (HCC) (Chronic) ICD-10-CM: I48.0  ICD-9-CM: 427.31         Chronic anticoagulation (Chronic) ICD-10-CM: Z79.01  ICD-9-CM: W09.87         Systolic CHF, chronic (HCC) (Chronic) ICD-10-CM: I50.22  ICD-9-CM: 428.22, 428.0         Acquired hypothyroidism (Chronic) ICD-10-CM: E03.9  ICD-9-CM: 244.9         Essential hypertension (Chronic) ICD-10-CM: I10  ICD-9-CM: 401.9               Past Medical History:      has a past medical history of Atrial fibrillation ([address] CAD (coronary artery disease); Headache; Hypertension; Hypothyroidism; and Stroke [facility]).     Past Surgical History:      has a past surgical history that includes hx appendectomy (1947); hx thyroidectomy (1950 Partial,    2003 Full); hx cataract removal (2009 x 2); pr cardiac surg procedure unlist (04/09/2018); and cath pericardiocentesis (4/22/2018). Home Medications:     Prior to Admission medications    Medication Sig Start Date End Date Taking? Authorizing Provider   cephALEXin (KEFLEX) 250 mg capsule Take 1 Cap by mouth three (3) times daily. 4/21/18  Yes Sarath Villalta MD   benzonatate (TESSALON) 100 mg capsule Take 1 Cap by mouth three (3) times daily as needed for Cough for up to 7 days. 4/19/18 4/26/18 Yes Augusto Ro MD   VIT C/E/ZN/COPPR/LUTEIN/ZEAXAN (PRESERVISION AREDS 2 PO) Take  by mouth two (2) times a day. Yes Historical Provider   latanoprost (XALATAN) 0.005 % ophthalmic solution Administer 1 Drop to both eyes nightly. 2/22/18  Yes Historical Provider   aspirin 81 mg chewable tablet Take 1 Tab by mouth daily. 2/2/18  Yes Erlinda Deras MD   levothyroxine (SYNTHROID) 75 mcg tablet Take 1 Tab by mouth Daily (before breakfast). 2/2/18  Yes Erlinda Deras MD   rivaroxaban (XARELTO) 20 mg tab tablet Take 1 Tab by mouth daily (with dinner). Indications: PREVENT THROMBOEMBOLISM IN CHRONIC ATRIAL FIBRILLATION 2/1/18  Yes Erlinda Deras MD   losartan (COZAAR) 100 mg tablet Take 100 mg by mouth daily. Yes Pat Mar MD   metoprolol succinate (TOPROL-XL) 25 mg XL tablet Take 25 mg by mouth daily. Yes Pat Mar MD   tamsulosin (FLOMAX) 0.4 mg capsule Take 0.8 mg by mouth daily. Yes Pat Mar MD   timolol (TIMOPTIC) 0.5 % ophthalmic solution Administer 1 Drop to both eyes daily. Yes Pat Mar MD       Allergies/Social/Family History: Allergies   Allergen Reactions    Chocolate Flavor Other (comments)      Social History   Substance Use Topics    Smoking status: Never Smoker    Smokeless tobacco: Never Used    Alcohol use No      No family history on file. Objective:   Vital Signs:  Visit Vitals    /75    Pulse 97    Temp 97.5 °F (36.4 °C)    Resp 19    Ht 6' 3\" (1.905 m)    Wt 103.6 kg (228 lb 8 oz)    SpO2 97%    BMI 28.56 kg/m2    O2 Flow Rate (L/min): 2 l/min O2 Device: Room air Temp (24hrs), Av.9 °F (36.6 °C), Min:97.5 °F (36.4 °C), Max:98.3 °F (36.8 °C)           Intake/Output:     Intake/Output Summary (Last 24 hours) at 18 1608  Last data filed at 18 1524   Gross per 24 hour   Intake             1280 ml   Output             2360 ml   Net            -1080 ml       Physical Exam:   General:  Alert, cooperative, no distress, appears stated age. Head:  Normocephalic, without obvious abnormality, atraumatic. Eyes:  Conjunctivae/corneas clear. PERRL   Neck: Supple, symmetrical, no adenopathy, no carotid bruit and no JVD. Lungs:   Decreased BS at bases but clear and breathing comfortably. Chest wall:  No tenderness or deformity. Has pericardial drain in place. Heart:  Regular rate and rhythm, S1-S2 normal, no murmur, no click, rub or gallop. Abdomen:   Soft, non-tender. Bowel sounds present. No masses,  No organomegaly. Extremities: Atraumatic, no cyanosis or edema. Pulses: Palpable   Skin: No rashes or lesions   Neurologic: Grossly nonfocal, motor strength intact. Was up walking with PT and OT. Psych: intact, no overt anxiety or depression.           LABS AND  DATA: Personally reviewed  Recent Labs      18   WBC  12.2*  8.4   HGB  9.9*  10.6*   HCT  29.3*  31.3*   PLT  305  280     Recent Labs      18   NA  134*  132*   K  4.3  4.6   CL  102  99   CO2  27  26   BUN  15  14   CREA  0.73  0.75   GLU  147*  138*   CA  8.2*  8.1*   MG  2.2  2.2   PHOS  2.9  3.4     Recent Labs      18   0404  18   0433   SGOT  15  21   AP  83  87   TP  5.6*  5.8*   ALB  2.6*  2.6*   GLOB  3.0  3.2     No results for input(s): INR, PTP, APTT in the last 72 hours.    No lab exists for component: INREXT, INREXT   No results for input(s): PHI, PCO2I, PO2I, FIO2I in the last 72 hours. No results for input(s): CPK, CKMB, TROIQ, BNPP in the last 72 hours. MEDS: Reviewed    Chest Imaging: personally reviewed images and report checked  4-25-18: EXAM: CXR Portable.     FINDINGS: Portable chest shows stable appearance including pericardial drain  since yesterday. There is no apparent pneumothorax. Lungs show unchanged left  base airspace disease. Heart size is top normal. There is no overt pulmonary  edema.     IMPRESSION: No significant change. Tele- reviewed    Medical decision making:   I have reviewed the flowsheet and previous day's notes  Acute or chronic illness that poses a threat to life or bodily function  Review and order of Clinical lab tests  Review and Order of Radiology tests  Independent visualization of Image  Reviewed high risk medications/sedatives      Thank you for allowing me to participate in this patient's care.     Radha Costello MD

## 2018-04-26 NOTE — PROGRESS NOTES
Problem: Mobility Impaired (Adult and Pediatric)  Goal: *Acute Goals and Plan of Care (Insert Text)  Physical Therapy Goals  Initiated 4/24/2018  1. Patient will move from supine to sit and sit to supine , scoot up and down and roll side to side in bed with independence within 7 day(s). 2.  Patient will transfer from bed to chair and chair to bed with independence using the least restrictive device within 7 day(s). 3.  Patient will perform sit to stand with independence within 7 day(s). 4.  Patient will ambulate with independence for 200 feet with the least restrictive device within 7 day(s). 5.  Patient will ascend/descend 16 stairs with 1 handrail(s) with modified independence within 7 day(s). physical Therapy TREATMENT  Patient: Patrick Garber (33 y.o. male)  Date: 4/26/2018  Diagnosis: Pericardial Effusion  Syncope  Cardiac tamponade Pericardial tamponade       Precautions:    Chart, physical therapy assessment, plan of care and goals were reviewed. ASSESSMENT:  Pt received seated in bedside chair, agreeable to participation with therapy. Pt continues to make steady gains towards therapy goals and overall functional mobility however is limited by anxiety and impaired endurance. Pt progressed ambulation trial to a distance of 200ft w/ cardiac cart and CG/standby assist this date before fatigue. Gait speed decreased however steady and stable overall with support of cardiac cart. Pt remains reluctant to attempt ambulation trial without support of cardiac cart/assistive device despite previous independence. [facility] and assist of wife. Pt may benefit from rolling walker at discharge however will continue to re-assess.     Progression toward goals:  [x]    Improving appropriately and progressing toward goals  []    Improving slowly and progressing toward goals  []    Not making progress toward goals and plan of care will be adjusted     PLAN:  Patient continues to benefit from skilled intervention to address the above impairments. Continue treatment per established plan of care. Discharge Recommendations:  Home Health  Further Equipment Recommendations for Discharge:  TBD, may benefit from RW     SUBJECTIVE:   Patient stated I've been in and out of the hospital 5 times since February. I want to stay home this time.     OBJECTIVE DATA SUMMARY:   Critical Behavior:  Neurologic State: Alert, Appropriate for age  Orientation Level: Oriented X4  Cognition: Appropriate for age attention/concentration  Safety/Judgement: Awareness of environment, Fall prevention, Insight into deficits  Functional Mobility Training:  Bed Mobility:                    Transfers:  Sit to Stand: Supervision  Stand to Sit: Supervision                             Balance:  Sitting: Intact  Standing: Impaired; With support (cardiac cart)  Standing - Static: Good;Constant support  Standing - Dynamic : Good  Ambulation/Gait Training:  Distance (ft): 180 Feet (ft)  Assistive Device: Gait belt (cardiac cart)  Ambulation - Level of Assistance: Contact guard assistance        Gait Abnormalities: Decreased step clearance        Base of Support: Narrowed     Speed/Susannah: Shuffled                   Pain:  Pain Scale 1: Numeric (0 - 10)  Pain Intensity 1: 0              Activity Tolerance:   VSS throughout on RA  Please refer to the flowsheet for vital signs taken during this treatment.   After treatment:   [x]    Patient left in no apparent distress sitting up in chair  []    Patient left in no apparent distress in bed  [x]    Call bell left within reach  [x]    Nursing notified  []    Caregiver present  []    Bed alarm activated    COMMUNICATION/COLLABORATION:   The patients plan of care was discussed with: Registered Nurse    Gwen Cr PT, DPT   Time Calculation: 18 mins

## 2018-04-26 NOTE — PROGRESS NOTES
Bedside shift change report given to Jamila Norris (oncoming nurse) by Martina Brewer RN (offgoing nurse). Report included the following information SBAR.       0930: Spoke with Dr. Errol Boss regarding solu-medrol orders. Pt has been titrating down everyday and I didn't see any orders for today. Orders received to give pt 20mg of IV solu-medrol. 1430: PT in working with pt. Pt up walking in the halls. Bedside shift change report given to JOHN Simmons (oncoming nurse) by Dahlia moyer RN (offgoing nurse). Report included the following information SBAR.

## 2018-04-26 NOTE — PROGRESS NOTES
Bedside and Verbal shift change report given to FRANKLIN Parrish RN (oncoming nurse) by Christopher Motta. Jennifer Yost RN (offgoing nurse). Report included the following information SBAR, Kardex, Intake/Output, MAR, Recent Results and Cardiac Rhythm NSR.   0300: Assessment completed. Patient easily awakened, oriented x 4, follows commands. VSS. Heart sounds clear. Pericardial drain intact, patent, draining scant serosanguinous fluid. site clean and dry with chlorhexidine patch at site. Lungs clear, diminished at bases. Abdomen soft, non-tender, active bowel sounds. Voids per urinal clear yellow urine. Extremities warm, peripheral pulses palpable. Afebrile.   8348: Bedside and Verbal shift change report given to NITESH Fu RN (oncoming nurse) by FRANKLIN Parrish RN (offgoing nurse). Report included the following information SBAR, Kardex, Intake/Output, MAR, Recent Results and Cardiac Rhythm NSR.

## 2018-04-26 NOTE — PROGRESS NOTES
Visited Jewish patient in CCU to offer [address]. Shared prayer with patient and shared Communion. Patient prefers to be addressed as \"Shabbir\". Provided listening presence as Mishel Nieto shared about his affiliation with the [address], [facility] [place] [place] [address] [facility] group whose mission includes caring for the sick and the poor. Will visit tomorrow for Communion.   SHANE Leung, [address] [facility], [address] [place] [address] Paging Service  [phone]

## 2018-04-26 NOTE — PROGRESS NOTES
Interdisciplinary team rounds were held 4/26/2018  with the following team members:Care Management, Diabetes Treatment Specialist, Nursing, Nutrition, Pharmacy, Physical Therapy, Physician, Respiratory Therapy and Clinical Coordinator. Plan of care discussed. Goal: See MD orders and progress notes for further  interventions and desired outcomes.

## 2018-04-26 NOTE — PROGRESS NOTES
Patient A/Ox4, walking with one assist. Lungs clear. Pericardial drain left side of chest, irrigating every 2 hours and monitoring output. BS active, ate 100% of dinner. BM this evening. Voiding. Pulses palpable. Skin intact. Coughs frequently, using PRNs; told RN he has had this cough ever since he had PNA after a trip to [facility]. VSS. Afebrile. Denies pain. 2330  No change in assessment, patient resting well. 0240  Bedside and Verbal shift change report given to Iris LOPEZ (oncoming nurse) by Deniz Hunt RN (offgoing nurse). Report included the following information SBAR, Kardex, Intake/Output, MAR, Recent Results, Med Rec Status and Cardiac Rhythm NSR.

## 2018-04-27 LAB
ALBUMIN SERPL-MCNC: 2.7 G/DL (ref 3.5–5)
ALBUMIN/GLOB SERPL: 0.9 {RATIO} (ref 1.1–2.2)
ALP SERPL-CCNC: 85 U/L (ref 45–117)
ALT SERPL-CCNC: 95 U/L (ref 12–78)
ANION GAP SERPL CALC-SCNC: 4 MMOL/L (ref 5–15)
AST SERPL-CCNC: 29 U/L (ref 15–37)
BACTERIA SPEC CULT: NORMAL
BASOPHILS # BLD: 0 K/UL (ref 0–0.1)
BASOPHILS NFR BLD: 0 % (ref 0–1)
BILIRUB SERPL-MCNC: 0.4 MG/DL (ref 0.2–1)
BUN SERPL-MCNC: 16 MG/DL (ref 6–20)
BUN/CREAT SERPL: 23 (ref 12–20)
CALCIUM SERPL-MCNC: 8.2 MG/DL (ref 8.5–10.1)
CHLORIDE SERPL-SCNC: 104 MMOL/L (ref 97–108)
CO2 SERPL-SCNC: 30 MMOL/L (ref 21–32)
CREAT SERPL-MCNC: 0.7 MG/DL (ref 0.7–1.3)
DIFFERENTIAL METHOD BLD: ABNORMAL
EOSINOPHIL # BLD: 0 K/UL (ref 0–0.4)
EOSINOPHIL NFR BLD: 0 % (ref 0–7)
ERYTHROCYTE [DISTWIDTH] IN BLOOD BY AUTOMATED COUNT: 13.5 % (ref 11.5–14.5)
GLOBULIN SER CALC-MCNC: 2.9 G/DL (ref 2–4)
GLUCOSE SERPL-MCNC: 96 MG/DL (ref 65–100)
GRAM STN SPEC: NORMAL
GRAM STN SPEC: NORMAL
HCT VFR BLD AUTO: 33.3 % (ref 36.6–50.3)
HGB BLD-MCNC: 11.1 G/DL (ref 12.1–17)
IMM GRANULOCYTES # BLD: 0 K/UL (ref 0–0.04)
IMM GRANULOCYTES NFR BLD AUTO: 0 % (ref 0–0.5)
LYMPHOCYTES # BLD: 1.3 K/UL (ref 0.8–3.5)
LYMPHOCYTES NFR BLD: 13 % (ref 12–49)
MAGNESIUM SERPL-MCNC: 2.2 MG/DL (ref 1.6–2.4)
MCH RBC QN AUTO: 28 PG (ref 26–34)
MCHC RBC AUTO-ENTMCNC: 33.3 G/DL (ref 30–36.5)
MCV RBC AUTO: 84.1 FL (ref 80–99)
MONOCYTES # BLD: 0.9 K/UL (ref 0–1)
MONOCYTES NFR BLD: 9 % (ref 5–13)
NEUTS SEG # BLD: 7.8 K/UL (ref 1.8–8)
NEUTS SEG NFR BLD: 78 % (ref 32–75)
NRBC # BLD: 0 K/UL (ref 0–0.01)
NRBC BLD-RTO: 0 PER 100 WBC
PHOSPHATE SERPL-MCNC: 3.1 MG/DL (ref 2.6–4.7)
PLATELET # BLD AUTO: 297 K/UL (ref 150–400)
PMV BLD AUTO: 9 FL (ref 8.9–12.9)
POTASSIUM SERPL-SCNC: 4.2 MMOL/L (ref 3.5–5.1)
PROT SERPL-MCNC: 5.6 G/DL (ref 6.4–8.2)
RBC # BLD AUTO: 3.96 M/UL (ref 4.1–5.7)
SERVICE CMNT-IMP: NORMAL
SODIUM SERPL-SCNC: 138 MMOL/L (ref 136–145)
WBC # BLD AUTO: 10.1 K/UL (ref 4.1–11.1)

## 2018-04-27 PROCEDURE — 84100 ASSAY OF PHOSPHORUS: CPT | Performed by: INTERNAL MEDICINE

## 2018-04-27 PROCEDURE — 74011250637 HC RX REV CODE- 250/637: Performed by: INTERNAL MEDICINE

## 2018-04-27 PROCEDURE — 36415 COLL VENOUS BLD VENIPUNCTURE: CPT | Performed by: INTERNAL MEDICINE

## 2018-04-27 PROCEDURE — 65660000000 HC RM CCU STEPDOWN

## 2018-04-27 PROCEDURE — 83735 ASSAY OF MAGNESIUM: CPT | Performed by: INTERNAL MEDICINE

## 2018-04-27 PROCEDURE — 85025 COMPLETE CBC W/AUTO DIFF WBC: CPT | Performed by: INTERNAL MEDICINE

## 2018-04-27 PROCEDURE — 97116 GAIT TRAINING THERAPY: CPT

## 2018-04-27 PROCEDURE — 80053 COMPREHEN METABOLIC PANEL: CPT | Performed by: INTERNAL MEDICINE

## 2018-04-27 RX ADMIN — Medication 10 ML: at 06:16

## 2018-04-27 RX ADMIN — LATANOPROST 1 DROP: 50 SOLUTION OPHTHALMIC at 21:51

## 2018-04-27 RX ADMIN — TAMSULOSIN HYDROCHLORIDE 0.8 MG: 0.4 CAPSULE ORAL at 08:51

## 2018-04-27 RX ADMIN — LEVOTHYROXINE SODIUM 75 MCG: 25 TABLET ORAL at 08:20

## 2018-04-27 RX ADMIN — BENZONATATE 100 MG: 100 CAPSULE ORAL at 21:47

## 2018-04-27 RX ADMIN — COLCHICINE 0.6 MG: 0.6 TABLET, FILM COATED ORAL at 18:44

## 2018-04-27 RX ADMIN — LEVOFLOXACIN 750 MG: 750 TABLET, FILM COATED ORAL at 08:26

## 2018-04-27 RX ADMIN — Medication 10 ML: at 13:12

## 2018-04-27 RX ADMIN — Medication 10 ML: at 21:55

## 2018-04-27 RX ADMIN — TIMOLOL MALEATE 1 DROP: 5 SOLUTION/ DROPS OPHTHALMIC at 08:53

## 2018-04-27 RX ADMIN — MULTIPLE VITAMINS W/ MINERALS TAB 1 TABLET: TAB at 08:51

## 2018-04-27 RX ADMIN — COLCHICINE 0.6 MG: 0.6 TABLET, FILM COATED ORAL at 08:53

## 2018-04-27 RX ADMIN — METOPROLOL SUCCINATE 25 MG: 25 TABLET, EXTENDED RELEASE ORAL at 08:50

## 2018-04-27 NOTE — PROGRESS NOTES
Problem: Falls - Risk of  Goal: *Absence of Falls  Document Siva Fall Risk and appropriate interventions in the flowsheet.    Outcome: Progressing Towards Goal  Fall Risk Interventions:  Mobility Interventions: Patient to call before getting OOB         Medication Interventions: Patient to call before getting OOB    Elimination Interventions: Bed/chair exit alarm, Call light in reach, Patient to call for help with toileting needs, Toileting schedule/hourly rounds, Toilet paper/wipes in reach, Urinal in reach    History of Falls Interventions: Bed/chair exit alarm, Room close to nurse's station

## 2018-04-27 NOTE — PROGRESS NOTES
Patient anointed with the [address] by Rev. Migue Colon on April 26, 2018.   Documented on April 27, 2018 by:    SHANE Butler, [address] [facility], Staff [address] [place] [address] Road Paging Service  [phone]

## 2018-04-27 NOTE — PROGRESS NOTES
Problem: Mobility Impaired (Adult and Pediatric)  Goal: *Acute Goals and Plan of Care (Insert Text)  Physical Therapy Goals  Initiated 4/24/2018  1. Patient will move from supine to sit and sit to supine , scoot up and down and roll side to side in bed with independence within 7 day(s). 2.  Patient will transfer from bed to chair and chair to bed with independence using the least restrictive device within 7 day(s). 3.  Patient will perform sit to stand with independence within 7 day(s). 4.  Patient will ambulate with independence for 200 feet with the least restrictive device within 7 day(s). 5.  Patient will ascend/descend 16 stairs with 1 handrail(s) with modified independence within 7 day(s). physical Therapy TREATMENT  Patient: Joon Santo (17 y.o. male)  Date: 4/27/2018  Diagnosis: Pericardial Effusion  Syncope  Cardiac tamponade Pericardial tamponade       Precautions:    Chart, physical therapy assessment, plan of care and goals were reviewed. ASSESSMENT:  Pt received supine in bed, agreeable to participation with therapy. Pt is making steady gains towards therapy goals and overall functional mobility however continues to present with decreased endurance/activity tolerance, mild dynamic balance deficits, and general anxiety regarding discharging home. Pt progressed ambulation trial to a distance of 300ft w/ standby assist only. Gait speed remains decreased however steady and stable overall. Following ambulation trial, pt maintained standing balance at sink while independently performing oral hygiene and handwashing without difficulty. All VSS throughout. Pt remains appropriate to discharge home w/ assist of wife and HH PT vs none. No equipment needs at this time.     Progression toward goals:  [x]    Improving appropriately and progressing toward goals  []    Improving slowly and progressing toward goals  []    Not making progress toward goals and plan of care will be adjusted     PLAN:   Patient continues to benefit from skilled intervention to address the above impairments. Continue treatment per established plan of care. Discharge Recommendations:  Home Health vs. None   Further Equipment Recommendations for Discharge:  None     SUBJECTIVE:   Patient stated Zari Bustamante said I might go home today or tomorrow.     OBJECTIVE DATA SUMMARY:   Critical Behavior:  Neurologic State: Alert, Appropriate for age  Orientation Level: Oriented X4  Cognition: Appropriate decision making, Appropriate for age attention/concentration, Appropriate safety awareness, Follows commands  Safety/Judgement: Awareness of environment, Fall prevention, Insight into deficits  Functional Mobility Training:  Bed Mobility:  Rolling: Modified independent  Supine to Sit: Modified independent     Scooting: Modified independent        Transfers:  Sit to Stand: Supervision  Stand to Sit: Supervision        Bed to Chair: Stand-by assistance                    Balance:  Sitting: Intact  Standing: Intact  Standing - Static: Good  Standing - Dynamic : Fair  Ambulation/Gait Training:  Distance (ft): 300 Feet (ft)  Assistive Device: Gait belt  Ambulation - Level of Assistance: Stand-by assistance        Gait Abnormalities: Decreased step clearance        Base of Support: Narrowed     Speed/Susannah: Pace decreased (<100 feet/min)  Step Length: Left shortened;Right shortened                    Pain:  Pain Scale 1: Numeric (0 - 10)  Pain Intensity 1: 0              Activity Tolerance:   VSS throughout on RA  Please refer to the flowsheet for vital signs taken during this treatment.   After treatment:   [x]    Patient left in no apparent distress sitting up in chair  []    Patient left in no apparent distress in bed  [x]    Call bell left within reach  [x]    Nursing notified  []    Caregiver present  []    Bed alarm activated    COMMUNICATION/COLLABORATION:   The patients plan of care was discussed with: Registered Nurse    Margaux Joshi, PT, DPT Time Calculation: 20 mins

## 2018-04-27 NOTE — PROGRESS NOTES
Cardiac Electrophysiology Progress Note            [address] [address]  [phone]    4/27/2018 9:46 AM    Admit Date: 4/21/2018    Admit Diagnosis: Pericardial Effusion  Syncope  Cardiac tamponade    Subjective:     Adriana Soares   denies chest pain, chest pressure/discomfort, dyspnea, lower extremity edema. Is looking forward to discharge however is anxious about being 90 miles from [address] [place] [address].      Visit Vitals    /83    Pulse 87    Temp 98.4 °F (36.9 °C)    Resp 13    Ht 6' 3\" (1.905 m)    Wt 221 lb 12.8 oz (100.6 kg)    SpO2 94%    BMI 27.72 kg/m2     Current Facility-Administered Medications   Medication Dose Route Frequency    metoprolol (LOPRESSOR) injection 5 mg  5 mg IntraVENous Q6H PRN    levoFLOXacin (LEVAQUIN) tablet 750 mg  750 mg Oral Q24H    colchicine tablet 0.6 mg  0.6 mg Oral BID    benzonatate (TESSALON) capsule 100 mg  100 mg Oral TID PRN    latanoprost (XALATAN) 0.005 % ophthalmic solution 1 Drop  1 Drop Both Eyes QHS    levothyroxine (SYNTHROID) tablet 75 mcg  75 mcg Oral ACB    metoprolol succinate (TOPROL-XL) XL tablet 25 mg  25 mg Oral DAILY    tamsulosin (FLOMAX) capsule 0.8 mg  0.8 mg Oral DAILY    timolol (TIMOPTIC) 0.5 % ophthalmic solution 1 Drop  1 Drop Both Eyes DAILY    multivitamin, tx-iron-ca-min (THERA-M w/ IRON) tablet 1 Tab  1 Tab Oral DAILY    sodium chloride (NS) flush 5-10 mL  5-10 mL IntraVENous Q8H    sodium chloride (NS) flush 5-10 mL  5-10 mL IntraVENous PRN    acetaminophen (TYLENOL) solution 650 mg  650 mg Oral Q4H PRN    chlorpheniramine-HYDROcodone (TUSSIONEX) oral suspension 5 mL  5 mL Oral Q12H PRN    HYDROcodone-acetaminophen (NORCO)  mg tablet 1 Tab  1 Tab Oral Q4H PRN         Objective:      Visit Vitals    /83    Pulse 87    Temp 98.4 °F (36.9 °C)    Resp 13    Ht 6' 3\" (1.905 m)    Wt 221 lb 12.8 oz (100.6 kg)    SpO2 94%    BMI 27.72 kg/m2       Physical Exam:  Abdomen: soft, non-tender  Extremities: extremities normal  Heart: regular rate and rhythm  Lungs: clear to auscultation bilaterally  Pulses: 2+ and symmetric    Data Review:   Labs:    Recent Labs      04/27/18 0418  04/25/18   0404   WBC  10.1  12.2*   HGB  11.1*  9.9*   HCT  33.3*  29.3*   PLT  297  305     Recent Labs      04/27/18   0418  04/25/18   0404   NA  138  134*   K  4.2  4.3   CL  104  102   CO2  30  27   GLU  96  147*   BUN  16  15   CREA  0.70  0.73   CA  8.2*  8.2*   MG  2.2  2.2   PHOS  3.1  2.9   ALB  2.7*  2.6*   TBILI  0.4  0.4   SGOT  29  15   ALT  95*  81*       No results for input(s): TROIQ, CPK, CKMB in the last 72 hours. Intake/Output Summary (Last 24 hours) at 04/27/18 0946  Last data filed at 04/27/18 0941   Gross per 24 hour   Intake             1290 ml   Output             3855 ml   Net            -2565 ml        Telemetry:sinus rhythm, ventricular rate 88    Assessment:     Principal Problem:    Pericardial tamponade (4/9/2018)    Active Problems:    Paroxysmal atrial fibrillation ([address] (1/31/2018)      Chronic anticoagulation (1/31/2018)      Essential hypertension (1/31/2018)      S/P ablation of atrial fibrillation (4/22/2018)      Cardiac tamponade (4/22/2018)        Plan:     Adriana Soares is a pleasant 78year old male, S/P Cardiac tamponade and with syncope on presentation, S/P therapeutic pericardiocentesis. Has remained in sinus off cardizem drip. BPs stable. His pericardial drain with 45cc out in last 24 hours. His drain was pulled, tegaderm applied. Pt to remain supine until 10:10am.  Plan for transfer to floor today.     91 Esvin Ingram MD, [facility]  4/27/2018  9:46 AM

## 2018-04-27 NOTE — PROGRESS NOTES
PULMONARY/CRITICAL CARE/SLEEP MEDICINE    Physician Consultation Note    Name: Lisa Forrest   : 1940   MRN: [id number]   Date: 2018        Assessment:     Cardiac tamponade and with syncope on presentation, status post therapeutic pericardiocentesis   Pericarditis? ? Discussed with Paula Ksenia Dickinson and Dr. Jeffy Valdes is on Colchicine, Solumedrol. Hgb has been stable. Hemodynamics are stable. Has been decreasing over last 2 days. Abnormal chest x-ray with interstitial edema and small left effusion-stable. History of atrial fibrillation and chronic anticoagulation  Other medical problems per chart    Remains acutely ill  Discussed with Mr. Ksenia Dickinson, nurse and Dr. Melissa Wilson this am.     Recommendations: Had drain pulled this am.   Abx- empiric Levaquin last dose. Has been working with PT and OT. Incentive spirometer  SCDs  Pain control, he requests [address] [facility] orders to go out to Tele. ? Home tomorrow. Will see again as needed. Please call if any issues. Subjective:   Consult Note: 2018   Requesting Physician: Dr. Esperanza Meier  Reason for consult: Pneumonia    Last 24 hrs  18: Pt has been feeling better. Got up and was working with PT and OT. Tolerating po intake pretty well. No chest pain. Has mild coughing. No leg pain. BP and Oxygenation have been good. Had pretty minimal output from his pericardial drain. 18:  Pt has mild chest pain at site of pericardial drain. Has been coughing, no back pain. No abdominal. He feels that his breathing is pretty comfortable today. NO headaches. Tolerating po intake. NO leg pain. No issues with constipation or diarrhea. NO issues with fevers, chills. Did not have any dizziness. Rest of ROS is negative of 10 systems except as above. Medical records and data reviewed. Patient is a 78 y.o. male who was brought to the hospital with syncope.  Patient underwent ablation and implantation of a loop monitor on 2018 which was complicated by pericardial tamponade managed with pericardiocentesis. After discharge she developed 2 episodes of syncope, ongoing fatigue and increased abdominal girth and was brought back to the emergency room. CT of the chest revealed large pericardial effusion. He was transferred back to [facility] where clinical examination was compatible with suspected tamponade at. He underwent emergent pericardiocentesis and is being observed in the Intensive Care Unit postprocedure. He has elevated white count but no fever. He has had cough productive of scant amount of discolored expectoration. He reports chest discomfort which is not pleuritic in nature. Chest x-ray shows better pulmonary vascular congestion, mild cardiomegaly and a small left effusion. He is on Levaquin and sputum culture is pending. Review of Systems:     A comprehensive 12 system review of systems was negative except for as documented in HPI      Active Problem List:     Problem List  Date Reviewed: 4/19/2018          Codes Class    S/P ablation of atrial fibrillation ICD-10-CM: Z98.890, Z86.79  ICD-9-CM: V45.89         Cardiac tamponade ICD-10-CM: I31.4  ICD-9-CM: 423.3         A-fib (HCC) ICD-10-CM: I48.91  ICD-9-CM: 427.31         * (Principal)Pericardial tamponade ICD-10-CM: I31.4  ICD-9-CM: 423.3         TIA (transient ischemic attack) ICD-10-CM: G45.9  ICD-9-CM: 435.9         Broca's aphasia ICD-10-CM: R47.01  ICD-9-CM: 805. 3         Paroxysmal atrial fibrillation (HCC) (Chronic) ICD-10-CM: I48.0  ICD-9-CM: 427.31         Chronic anticoagulation (Chronic) ICD-10-CM: Z79.01  ICD-9-CM: W66.84         Systolic CHF, chronic (HCC) (Chronic) ICD-10-CM: I50.22  ICD-9-CM: 428.22, 428.0         Acquired hypothyroidism (Chronic) ICD-10-CM: E03.9  ICD-9-CM: 244.9         Essential hypertension (Chronic) ICD-10-CM: I10  ICD-9-CM: 401.9               Past Medical History:      has a past medical history of Atrial fibrillation ([facility] CAD (coronary artery disease); Headache; Hypertension; Hypothyroidism; and Stroke [facility]). Past Surgical History:      has a past surgical history that includes hx appendectomy (1947); hx thyroidectomy (1950 Partial,    2003 Full); hx cataract removal (2009 x 2); pr cardiac surg procedure unlist (04/09/2018); and cath pericardiocentesis (4/22/2018). Home Medications:     Prior to Admission medications    Medication Sig Start Date End Date Taking? Authorizing Provider   cephALEXin (KEFLEX) 250 mg capsule Take 1 Cap by mouth three (3) times daily. 4/21/18  Yes Tonie Lucia MD   benzonatate (TESSALON) 100 mg capsule Take 1 Cap by mouth three (3) times daily as needed for Cough for up to 7 days. 4/19/18 4/26/18 Yes Augusto Wheat MD   VIT C/E/ZN/COPPR/LUTEIN/ZEAXAN (PRESERVISION AREDS 2 PO) Take  by mouth two (2) times a day. Yes Historical Provider   latanoprost (XALATAN) 0.005 % ophthalmic solution Administer 1 Drop to both eyes nightly. 2/22/18  Yes Historical Provider   aspirin 81 mg chewable tablet Take 1 Tab by mouth daily. 2/2/18  Yes Theresa Eric MD   levothyroxine (SYNTHROID) 75 mcg tablet Take 1 Tab by mouth Daily (before breakfast). 2/2/18  Yes Theresa Eric MD   rivaroxaban (XARELTO) 20 mg tab tablet Take 1 Tab by mouth daily (with dinner). Indications: PREVENT THROMBOEMBOLISM IN CHRONIC ATRIAL FIBRILLATION 2/1/18  Yes Theresa Eric MD   losartan (COZAAR) 100 mg tablet Take 100 mg by mouth daily. Yes Pat Mar MD   metoprolol succinate (TOPROL-XL) 25 mg XL tablet Take 25 mg by mouth daily. Yes Pat Mar MD   tamsulosin (FLOMAX) 0.4 mg capsule Take 0.8 mg by mouth daily. Yes Pat Mar MD   timolol (TIMOPTIC) 0.5 % ophthalmic solution Administer 1 Drop to both eyes daily. Yes Pat Mar MD       Allergies/Social/Family History:      Allergies   Allergen Reactions    Chocolate Flavor Other (comments)      Social History   Substance Use Topics    Smoking status: Never Smoker    Smokeless tobacco: Never Used    Alcohol use No      No family history on file. Objective:   Vital Signs:  Visit Vitals    BP 94/50    Pulse (!) 104    Temp 97.4 °F (36.3 °C)    Resp 15    Ht 6' 3\" (1.905 m)    Wt 100.6 kg (221 lb 12.8 oz)    SpO2 96%    BMI 27.72 kg/m2    O2 Flow Rate (L/min): 2 l/min O2 Device: Room air Temp (24hrs), Av.8 °F (36.6 °C), Min:97.4 °F (36.3 °C), Max:98.4 °F (36.9 °C)           Intake/Output:     Intake/Output Summary (Last 24 hours) at 18 1413  Last data filed at 18 1220   Gross per 24 hour   Intake             1200 ml   Output             3480 ml   Net            -2280 ml       Physical Exam:   General:  Alert, cooperative, no distress, appears stated age. Very pleasant when seen this am.    Head:  Normocephalic, without obvious abnormality, atraumatic. Eyes:  Conjunctivae/corneas clear. PERRL   Neck: Supple, symmetrical, no adenopathy, no carotid bruit and no JVD. Lungs:   Decreased BS at bases but clear and breathing comfortably. Chest wall:  No tenderness or deformity. Has pericardial drain in place. This was removed about 10am.    Heart:  Regular rate and rhythm, S1-S2 normal, no murmur, no click, rub or gallop. Abdomen:   Soft, non-tender. Bowel sounds present. No masses,  No organomegaly. Extremities: Atraumatic, no cyanosis or edema. Pulses: Palpable   Skin: No rashes or lesions   Neurologic: Grossly nonfocal, motor strength intact. Psych: intact, no overt anxiety or depression.           LABS AND  DATA: Personally reviewed  Recent Labs      18   0404   WBC  10.1  12.2*   HGB  11.1*  9.9*   HCT  33.3*  29.3*   PLT  297  305     Recent Labs      18   0418  18   0404   NA  138  134*   K  4.2  4.3   CL  104  102   CO2  30  27   BUN  16  15   CREA  0.70  0.73   GLU  96  147*   CA  8.2*  8.2*   MG  2.2  2.2   PHOS  3.1  2.9     Recent Labs      18   0418  18   0404   SGOT  29  15   AP  85 83   TP  5.6*  5.6*   ALB  2.7*  2.6*   GLOB  2.9  3.0     No results for input(s): INR, PTP, APTT in the last 72 hours. No lab exists for component: INREXT, INREXT   No results for input(s): PHI, PCO2I, PO2I, FIO2I in the last 72 hours. No results for input(s): CPK, CKMB, TROIQ, BNPP in the last 72 hours. MEDS: Reviewed    Chest Imaging: personally reviewed images and report checked  4-25-18: EXAM: CXR Portable.     FINDINGS: Portable chest shows stable appearance including pericardial drain  since yesterday. There is no apparent pneumothorax. Lungs show unchanged left  base airspace disease. Heart size is top normal. There is no overt pulmonary  edema.     IMPRESSION: No significant change. Tele- reviewed    Medical decision making:   I have reviewed the flowsheet and previous day's notes  Acute or chronic illness that poses a threat to life or bodily function  Review and order of Clinical lab tests  Review and Order of Radiology tests  Independent visualization of Image  Reviewed high risk medications/sedatives      Thank you for allowing me to participate in this patient's care.     Roque Montano MD

## 2018-04-27 NOTE — PROGRESS NOTES
Bedside and Verbal shift change report given to FRANKLIN Neff RN (oncoming nurse) by Fadia Machuca. Aubrey Sullivan RN (offgoing nurse). Report included the following information SBAR, Kardex, Intake/Output, MAR, Recent Results and Cardiac Rhythm NSR.   2000: Patient sitting up in chair. No distress noted, alert and oriented x 4. Sinus tachycardia on monitor, 100-110's. BP stable. Lungs clear. L chest pericardial drain to gravity drainage with scant serous fluid drainage. Heart sounds clear, S1 S2. Abdomen soft, non-tender with active bowel sounds. BM x 4 today. Voids per urinal. LLE 1+ pitting ankle edema, RLE trace ankle edema. Peripheral pulses palpable. 2330: Assisted patinet up to [facility] for BM, then back to bed. Assessment unchanged. Irrigated pericardial drain per orders. Scant serosanguinous drainage noted. VSS.

## 2018-04-27 NOTE — PROGRESS NOTES
Report received outside of room, patient sleeping deeply at present. Lab values reviewed with Iris, will continue to monitor. Plan of care discussed. 0820  Assessment completed. Denies pain. To have drain removed later today. [address] removed by Dr. Errol Boss, dressing intact and without drainage. To sit up in 1 hour. 1015 Breakfast heated, to get OOB soon. 36  OOB with PT now, tolerating movement well. No bleeding or hematoma noted. [address] [place], assessment unchanged. OOB to toilet to have BM. Returned to chair without incident.

## 2018-04-27 NOTE — PROGRESS NOTES
Critical care interdisciplinary rounds held on [id number]. Following members present, Pharmacy, Diabetes Treatment, Case Management, Occupational Therapy, Physical Therapy, Pastoral Care  and Nutrition. Plan of care discussed. See clinical pathway fo plan of care and interventions and desired outcomes.

## 2018-04-28 LAB
ALBUMIN SERPL-MCNC: 2.7 G/DL (ref 3.5–5)
ALBUMIN/GLOB SERPL: 0.9 {RATIO} (ref 1.1–2.2)
ALP SERPL-CCNC: 92 U/L (ref 45–117)
ALT SERPL-CCNC: 106 U/L (ref 12–78)
ANION GAP SERPL CALC-SCNC: 5 MMOL/L (ref 5–15)
AST SERPL-CCNC: 32 U/L (ref 15–37)
BILIRUB SERPL-MCNC: 0.5 MG/DL (ref 0.2–1)
BUN SERPL-MCNC: 17 MG/DL (ref 6–20)
BUN/CREAT SERPL: 20 (ref 12–20)
CALCIUM SERPL-MCNC: 8.1 MG/DL (ref 8.5–10.1)
CHLORIDE SERPL-SCNC: 103 MMOL/L (ref 97–108)
CO2 SERPL-SCNC: 28 MMOL/L (ref 21–32)
CREAT SERPL-MCNC: 0.84 MG/DL (ref 0.7–1.3)
ERYTHROCYTE [DISTWIDTH] IN BLOOD BY AUTOMATED COUNT: 13.5 % (ref 11.5–14.5)
GLOBULIN SER CALC-MCNC: 3 G/DL (ref 2–4)
GLUCOSE SERPL-MCNC: 119 MG/DL (ref 65–100)
HCT VFR BLD AUTO: 34.6 % (ref 36.6–50.3)
HGB BLD-MCNC: 11.6 G/DL (ref 12.1–17)
MAGNESIUM SERPL-MCNC: 2.1 MG/DL (ref 1.6–2.4)
MCH RBC QN AUTO: 28.3 PG (ref 26–34)
MCHC RBC AUTO-ENTMCNC: 33.5 G/DL (ref 30–36.5)
MCV RBC AUTO: 84.4 FL (ref 80–99)
NRBC # BLD: 0 K/UL (ref 0–0.01)
NRBC BLD-RTO: 0 PER 100 WBC
PHOSPHATE SERPL-MCNC: 3.8 MG/DL (ref 2.6–4.7)
PLATELET # BLD AUTO: 293 K/UL (ref 150–400)
PMV BLD AUTO: 9.2 FL (ref 8.9–12.9)
POTASSIUM SERPL-SCNC: 4 MMOL/L (ref 3.5–5.1)
PROT SERPL-MCNC: 5.7 G/DL (ref 6.4–8.2)
RBC # BLD AUTO: 4.1 M/UL (ref 4.1–5.7)
SODIUM SERPL-SCNC: 136 MMOL/L (ref 136–145)
WBC # BLD AUTO: 7.9 K/UL (ref 4.1–11.1)

## 2018-04-28 PROCEDURE — 74011250637 HC RX REV CODE- 250/637: Performed by: INTERNAL MEDICINE

## 2018-04-28 PROCEDURE — 83735 ASSAY OF MAGNESIUM: CPT | Performed by: INTERNAL MEDICINE

## 2018-04-28 PROCEDURE — 65660000000 HC RM CCU STEPDOWN

## 2018-04-28 PROCEDURE — 36415 COLL VENOUS BLD VENIPUNCTURE: CPT | Performed by: INTERNAL MEDICINE

## 2018-04-28 PROCEDURE — 85027 COMPLETE CBC AUTOMATED: CPT | Performed by: INTERNAL MEDICINE

## 2018-04-28 PROCEDURE — 84100 ASSAY OF PHOSPHORUS: CPT | Performed by: INTERNAL MEDICINE

## 2018-04-28 PROCEDURE — 80053 COMPREHEN METABOLIC PANEL: CPT | Performed by: INTERNAL MEDICINE

## 2018-04-28 RX ADMIN — COLCHICINE 0.6 MG: 0.6 TABLET, FILM COATED ORAL at 09:23

## 2018-04-28 RX ADMIN — TAMSULOSIN HYDROCHLORIDE 0.8 MG: 0.4 CAPSULE ORAL at 09:19

## 2018-04-28 RX ADMIN — LEVOFLOXACIN 750 MG: 750 TABLET, FILM COATED ORAL at 07:30

## 2018-04-28 RX ADMIN — LEVOTHYROXINE SODIUM 75 MCG: 25 TABLET ORAL at 07:30

## 2018-04-28 RX ADMIN — TIMOLOL MALEATE 1 DROP: 5 SOLUTION/ DROPS OPHTHALMIC at 09:24

## 2018-04-28 RX ADMIN — METOPROLOL SUCCINATE 25 MG: 25 TABLET, EXTENDED RELEASE ORAL at 09:19

## 2018-04-28 RX ADMIN — MULTIPLE VITAMINS W/ MINERALS TAB 1 TABLET: TAB at 09:19

## 2018-04-28 RX ADMIN — COLCHICINE 0.6 MG: 0.6 TABLET, FILM COATED ORAL at 17:47

## 2018-04-28 RX ADMIN — Medication 10 ML: at 17:47

## 2018-04-28 NOTE — PROGRESS NOTES
Bedside shift change report given to JOHN Ulrich (oncoming nurse) by Aj Andrews (offgoing nurse). Report included the following information SBAR, Kardex, Intake/Output, MAR and Recent Results.

## 2018-04-28 NOTE — PROGRESS NOTES
Primary Nurse Monico Mello RN and Jamel Tran RN performed a dual skin assessment on this patient. Pt skin is intact. Patient has brusing to R and L groin areas.   Herrera score is 20

## 2018-04-28 NOTE — PROGRESS NOTES
Cardiology Progress Note      4/28/2018 12:48 PM    Admit Date: 4/21/2018    Admit Diagnosis: Pericardial Effusion;Syncope;Cardiac tamponade      Subjective:     Yennifer Vargas feels weak. debnies any chest pain, SOB. Sinus rhythm. Visit Vitals    /60 (BP 1 Location: Right arm, BP Patient Position: Sitting)    Pulse (!) 101    Temp 97.8 °F (36.6 °C)    Resp 18    Ht 6' 3\" (1.905 m)    Wt 218 lb 14.4 oz (99.3 kg)    SpO2 96%    BMI 27.36 kg/m2       Current Facility-Administered Medications   Medication Dose Route Frequency    colchicine tablet 0.6 mg  0.6 mg Oral BID    benzonatate (TESSALON) capsule 100 mg  100 mg Oral TID PRN    latanoprost (XALATAN) 0.005 % ophthalmic solution 1 Drop  1 Drop Both Eyes QHS    levothyroxine (SYNTHROID) tablet 75 mcg  75 mcg Oral ACB    metoprolol succinate (TOPROL-XL) XL tablet 25 mg  25 mg Oral DAILY    tamsulosin (FLOMAX) capsule 0.8 mg  0.8 mg Oral DAILY    timolol (TIMOPTIC) 0.5 % ophthalmic solution 1 Drop  1 Drop Both Eyes DAILY    multivitamin, tx-iron-ca-min (THERA-M w/ IRON) tablet 1 Tab  1 Tab Oral DAILY    sodium chloride (NS) flush 5-10 mL  5-10 mL IntraVENous Q8H    sodium chloride (NS) flush 5-10 mL  5-10 mL IntraVENous PRN    acetaminophen (TYLENOL) solution 650 mg  650 mg Oral Q4H PRN    chlorpheniramine-HYDROcodone (TUSSIONEX) oral suspension 5 mL  5 mL Oral Q12H PRN    HYDROcodone-acetaminophen (NORCO)  mg tablet 1 Tab  1 Tab Oral Q4H PRN         Objective:      Physical Exam:  Visit Vitals    /60 (BP 1 Location: Right arm, BP Patient Position: Sitting)    Pulse (!) 101    Temp 97.8 °F (36.6 °C)    Resp 18    Ht 6' 3\" (1.905 m)    Wt 218 lb 14.4 oz (99.3 kg)    SpO2 96%    BMI 27.36 kg/m2     General Appearance:  Well developed, well nourished,alert and oriented x 3, and individual in no acute distress. Ears/Nose/Mouth/Throat:   Hearing grossly normal.         Neck: Supple.    Chest:   Lungs clear to auscultation bilaterally. Cardiovascular:  Regular rate and rhythm, S1, S2 normal, no murmur. Abdomen:   Soft, non-tender, bowel sounds are active. Extremities: No edema bilaterally. Skin: Warm and dry. Data Review:   Labs:    Recent Results (from the past 24 hour(s))   CBC W/O DIFF    Collection Time: 04/28/18  1:58 AM   Result Value Ref Range    WBC 7.9 4.1 - 11.1 K/uL    RBC 4.10 4. 10 - 5.70 M/uL    HGB 11.6 (L) 12.1 - 17.0 g/dL    HCT 34.6 (L) 36.6 - 50.3 %    MCV 84.4 80.0 - 99.0 FL    MCH 28.3 26.0 - 34.0 PG    MCHC 33.5 30.0 - 36.5 g/dL    RDW 13.5 11.5 - 14.5 %    PLATELET 160 451 - 060 K/uL    MPV 9.2 8.9 - 12.9 FL    NRBC 0.0 0  WBC    ABSOLUTE NRBC 0.00 0.00 - 2.05 K/uL   METABOLIC PANEL, COMPREHENSIVE    Collection Time: 04/28/18  1:58 AM   Result Value Ref Range    Sodium 136 136 - 145 mmol/L    Potassium 4.0 3.5 - 5.1 mmol/L    Chloride 103 97 - 108 mmol/L    CO2 28 21 - 32 mmol/L    Anion gap 5 5 - 15 mmol/L    Glucose 119 (H) 65 - 100 mg/dL    BUN 17 6 - 20 MG/DL    Creatinine 0.84 0.70 - 1.30 MG/DL    BUN/Creatinine ratio 20 12 - 20      GFR est AA >60 >60 ml/min/1.73m2    GFR est non-AA >60 >60 ml/min/1.73m2    Calcium 8.1 (L) 8.5 - 10.1 MG/DL    Bilirubin, total 0.5 0.2 - 1.0 MG/DL    ALT (SGPT) 106 (H) 12 - 78 U/L    AST (SGOT) 32 15 - 37 U/L    Alk.  phosphatase 92 45 - 117 U/L    Protein, total 5.7 (L) 6.4 - 8.2 g/dL    Albumin 2.7 (L) 3.5 - 5.0 g/dL    Globulin 3.0 2.0 - 4.0 g/dL    A-G Ratio 0.9 (L) 1.1 - 2.2     MAGNESIUM    Collection Time: 04/28/18  1:58 AM   Result Value Ref Range    Magnesium 2.1 1.6 - 2.4 mg/dL   PHOSPHORUS    Collection Time: 04/28/18  1:58 AM   Result Value Ref Range    Phosphorus 3.8 2.6 - 4.7 MG/DL       Telemetry: normal sinus rhythm      Assessment:     Principal Problem:    Pericardial tamponade (4/9/2018)    Active Problems:    Paroxysmal atrial fibrillation (HCC) (1/31/2018)      Chronic anticoagulation (1/31/2018)      Essential hypertension (1/31/2018)      S/P ablation of atrial fibrillation (4/22/2018)      Cardiac tamponade (4/22/2018)        Plan:     Encourage ambulation. Continue current therapy.     Renetta Ramirez MD

## 2018-04-28 NOTE — PROGRESS NOTES
Problem: Falls - Risk of  Goal: *Absence of Falls  Document Siva Fall Risk and appropriate interventions in the flowsheet.    Outcome: Progressing Towards Goal  Fall Risk Interventions:  Mobility Interventions: Bed/chair exit alarm    Mentation Interventions: Adequate sleep, hydration, pain control    Medication Interventions: Patient to call before getting OOB    Elimination Interventions: Call light in reach    History of Falls Interventions: Bed/chair exit alarm

## 2018-04-28 NOTE — PROGRESS NOTES
Bedside and Verbal shift change report given by Krissy Vazquez. Report included the following information SBAR, Kardex, MAR and Recent Results. SITUATION:  Code Status: Full Code  Reason for Admission: Pericardial Effusion  Syncope  Cardiac tamponade  Hospital day: 7  Problem List:       Hospital Problems  Date Reviewed: 4/19/2018          Codes Class Noted POA    S/P ablation of atrial fibrillation ICD-10-CM: Z98.890, Z86.79  ICD-9-CM: V45.89  4/22/2018 Unknown        Cardiac tamponade ICD-10-CM: I31.4  ICD-9-CM: 423.3  4/22/2018 Unknown        * (Principal)Pericardial tamponade ICD-10-CM: I31.4  ICD-9-CM: 423.3  4/9/2018 Yes        Paroxysmal atrial fibrillation (HCC) (Chronic) ICD-10-CM: I48.0  ICD-9-CM: 427.31  1/31/2018 Yes        Chronic anticoagulation (Chronic) ICD-10-CM: Z79.01  ICD-9-CM: V58.61  1/31/2018 Yes        Essential hypertension (Chronic) ICD-10-CM: I10  ICD-9-CM: 401.9  1/31/2018 Yes              BACKGROUND:   Past Medical History:   Past Medical History:   Diagnosis Date    Atrial fibrillation ([facility] [address]     CAD (coronary artery disease)     Headache     Hypertension     Hypothyroidism     Stroke [facility])       Patient taking anticoagulants no    Patient has a defibrillator: no    SCDs in use. ASSESSMENT:  Changes in Assessment Throughout Shift: none  Mobility Issues: up with standby assistance only  IV patent and capped.     Last Vitals:  Vitals w/ MEWS Score (last day)     Date/Time MEWS Score Pulse Resp Temp BP Level of Consciousness SpO2    04/27/18 2340 1 92 17 97.3 °F (36.3 °C) 145/84 Alert 97 %    04/27/18 2000 -- -- -- -- -- Alert 97 %    04/27/18 1959 -- -- -- 98.1 °F (36.7 °C) -- -- --    04/27/18 1900 -- (!)  108 18 -- -- -- --    04/27/18 1620 -- 88 17 97.9 °F (36.6 °C) -- Alert 97 %    04/27/18 1600 -- 96 16 -- 130/77 -- 98 %    04/27/18 1400 -- (!)  104 15 -- 94/50 -- 96 %    04/27/18 1300 -- (!)  109 20 -- 99/51 -- --    04/27/18 1226 2 (!)  109 18 97.4 °F (36.3 °C) 102/61 Alert --    04/27/18 1225 -- (!)  109 17 -- -- -- --    04/27/18 1200 -- (!)  106 17 -- -- -- --    04/27/18 1100 -- 98 17 -- 142/72 -- 97 %    04/27/18 1000 -- 89 15 -- (!)  145/91 -- 96 %    04/27/18 0941 -- 87 13 -- 141/83 -- 94 %    04/27/18 0900 -- 92 13 -- (!)  160/91 -- 97 %    04/27/18 0820 -- 90 14 98.4 °F (36.9 °C) -- Alert 96 %    04/27/18 0800 -- 91 18 -- (!)  151/91 -- 97 %    04/27/18 0600 -- 83 11 -- 132/77 -- 96 %    04/27/18 0500 -- 84 12 -- 137/89 -- 95 %    04/27/18 0400 0 81 13 97.9 °F (36.6 °C) 135/83 Alert 96 %    04/27/18 0300 -- 81 11 -- 141/81 -- 95 %    04/27/18 0200 -- 81 12 -- 134/90 -- 96 %    04/27/18 0100 -- 83 11 -- (!)  140/91 -- 96 %    04/27/18 0000 -- 84 13 -- 141/85 -- 97 %            PAIN    Pain Assessment    Pain Intensity 1: 0 (04/27/18 2340)    Pain Location 1: Incisional    Pain Intervention(s) 1: Medication (see MAR)    Patient Stated Pain Goal: 0  I  Last 3 Weights:  Last 3 Recorded Weights in this Encounter    04/25/18 1141 04/26/18 2000 04/27/18 2145   Weight: 103.6 kg (228 lb 8 oz) 100.6 kg (221 lb 12.8 oz) 99.3 kg (218 lb 14.4 oz)   Weight change: -1.315 kg (-2 lb 14.4 oz)    INTAKE/OUPUT    Current Shift: 04/27 1901 - 04/28 0700  In: -   Out: [address] [place] [address] Road [Urine:675]    Last three shifts: 04/26 0701 - 04/27 1900  In: 1904 [P.O.:2300]  Out: 3025 [Urine:4540; Drains:45]    RECOMMENDATIONS AND DISCHARGE PLANNING      Discharge plan for patient: home  Discharge planning Needs or Barriers: none identified     \"HEALS\" SAFETY CHECK  A safety check occurred in the patient's room between off going nurse and oncoming nurse listed above. The safety check included the below items:    H  No high alert medications and no active infusions. E  Equipment Suction is set up for ALL patients (with yanker)  Red plugs utilized for all equipment (IV pumps, etc.)  WOWs wiped down at end of shift.   Room stocked with oxygen, suction, and other unit-specific supplies  A  Alarms Bed alarm is set for fall risk patients  Ensure chair alarm is in place and activated if patient is up in a chair  L  PIV is patent and capped. No lines or infusions at this time. S  Safety  Room is clean, patient is clean, and equipment is clean. Hallways are clear from equipment besides carts. Fall bracelet on for fall risk patients  Ensure room is clear and free of clutter  Suction is set up for ALL patients (with yanker)  Hallways are clear from equipment besides carts.    Isolation precautions followed, supplies available outside room, sign posted    Wesley Adair RN     0080 report called to telemetry nurse, transported via wheel chair with monitor, no issues

## 2018-04-28 NOTE — PROGRESS NOTES
2300   Patient received at 1900 hours; on room air; no complaint of pain; alert, oriented; moving all extremities; up in chair with minimal assist; from chair to bed with minimal assist; in sinus tach/sinus rhythm; one peripheral IV site capped; on po diet; no nausea, no emesis; voiding without difficulty clear yellow urine; clear op-site dressing intact left chest; no family at bedside this evening; report given to Krupa Acevedo RN.

## 2018-04-29 PROCEDURE — 74011250637 HC RX REV CODE- 250/637: Performed by: INTERNAL MEDICINE

## 2018-04-29 PROCEDURE — 74011250636 HC RX REV CODE- 250/636: Performed by: INTERNAL MEDICINE

## 2018-04-29 PROCEDURE — 65660000000 HC RM CCU STEPDOWN

## 2018-04-29 RX ORDER — ONDANSETRON 2 MG/ML
4 INJECTION INTRAMUSCULAR; INTRAVENOUS
Status: DISCONTINUED | OUTPATIENT
Start: 2018-04-29 | End: 2018-04-30 | Stop reason: HOSPADM

## 2018-04-29 RX ADMIN — Medication 10 ML: at 21:08

## 2018-04-29 RX ADMIN — LEVOTHYROXINE SODIUM 75 MCG: 25 TABLET ORAL at 08:37

## 2018-04-29 RX ADMIN — LATANOPROST 1 DROP: 50 SOLUTION OPHTHALMIC at 21:08

## 2018-04-29 RX ADMIN — TAMSULOSIN HYDROCHLORIDE 0.8 MG: 0.4 CAPSULE ORAL at 11:23

## 2018-04-29 RX ADMIN — TIMOLOL MALEATE 1 DROP: 5 SOLUTION/ DROPS OPHTHALMIC at 11:21

## 2018-04-29 RX ADMIN — METOPROLOL SUCCINATE 25 MG: 25 TABLET, EXTENDED RELEASE ORAL at 11:20

## 2018-04-29 RX ADMIN — Medication 10 ML: at 07:06

## 2018-04-29 RX ADMIN — COLCHICINE 0.6 MG: 0.6 TABLET, FILM COATED ORAL at 11:20

## 2018-04-29 RX ADMIN — Medication 10 ML: at 15:07

## 2018-04-29 RX ADMIN — ONDANSETRON 4 MG: 2 INJECTION INTRAMUSCULAR; INTRAVENOUS at 09:44

## 2018-04-29 RX ADMIN — COLCHICINE 0.6 MG: 0.6 TABLET, FILM COATED ORAL at 18:10

## 2018-04-29 NOTE — PROGRESS NOTES
Problem: Falls - Risk of  Goal: *Absence of Falls  Document Siva Fall Risk and appropriate interventions in the flowsheet.    Outcome: Progressing Towards Goal  Fall Risk Interventions:  Mobility Interventions: Bed/chair exit alarm    Mentation Interventions: Adequate sleep, hydration, pain control, Bed/chair exit alarm    Medication Interventions: Bed/chair exit alarm, Patient to call before getting OOB    Elimination Interventions: Call light in reach, Urinal in reach    History of Falls Interventions: Bed/chair exit alarm

## 2018-04-29 NOTE — PROGRESS NOTES
SHIFT SUMMARY:  0901: Patient c/o N/V; denied sob or any pain; no diaphoresis noted; bowel sounds active but distended; last BM this morning and regular; vital signs documented with HR elevated in the low 100's and regular; MD paged    0360: MD called nurse; verbal orders received for antiemetics     1030: Patient stated N/V eased off after antiemetic; patient is able to eat breakfast at this time; nurse will try giving AM schedule meds after breakfast.    1124: Patient was able to tolerate his AM medications after breakfast    1900: Bedside shift change report given to Sidney Ellsworth (oncoming nurse) by Landry Díaz (offgoing nurse). Report included the following information SBAR, Kardex, Intake/Output, MAR and Recent Results.

## 2018-04-29 NOTE — PROGRESS NOTES
Cardiology Progress Note      4/29/2018 12:55 PM    Admit Date: 4/21/2018    Admit Diagnosis: Pericardial Effusion;Syncope;Cardiac tamponade      Subjective:     Dante Seip had N/V this morning. Resolved with zofran. No chest pain, SOB. Visit Vitals    /69 (BP 1 Location: Right arm, BP Patient Position: At rest)    Pulse 100    Temp 98.5 °F (36.9 °C)    Resp 19    Ht 6' 3\" (1.905 m)    Wt 214 lb 4.6 oz (97.2 kg)    SpO2 97%    BMI 26.78 kg/m2       Current Facility-Administered Medications   Medication Dose Route Frequency    ondansetron (ZOFRAN) injection 4 mg  4 mg IntraVENous Q8H PRN    colchicine tablet 0.6 mg  0.6 mg Oral BID    benzonatate (TESSALON) capsule 100 mg  100 mg Oral TID PRN    latanoprost (XALATAN) 0.005 % ophthalmic solution 1 Drop  1 Drop Both Eyes QHS    levothyroxine (SYNTHROID) tablet 75 mcg  75 mcg Oral ACB    metoprolol succinate (TOPROL-XL) XL tablet 25 mg  25 mg Oral DAILY    tamsulosin (FLOMAX) capsule 0.8 mg  0.8 mg Oral DAILY    timolol (TIMOPTIC) 0.5 % ophthalmic solution 1 Drop  1 Drop Both Eyes DAILY    multivitamin, tx-iron-ca-min (THERA-M w/ IRON) tablet 1 Tab  1 Tab Oral DAILY    sodium chloride (NS) flush 5-10 mL  5-10 mL IntraVENous Q8H    sodium chloride (NS) flush 5-10 mL  5-10 mL IntraVENous PRN    acetaminophen (TYLENOL) solution 650 mg  650 mg Oral Q4H PRN    chlorpheniramine-HYDROcodone (TUSSIONEX) oral suspension 5 mL  5 mL Oral Q12H PRN    HYDROcodone-acetaminophen (NORCO)  mg tablet 1 Tab  1 Tab Oral Q4H PRN         Objective:      Physical Exam:  Visit Vitals    /69 (BP 1 Location: Right arm, BP Patient Position: At rest)    Pulse 100    Temp 98.5 °F (36.9 °C)    Resp 19    Ht 6' 3\" (1.905 m)    Wt 214 lb 4.6 oz (97.2 kg)    SpO2 97%    BMI 26.78 kg/m2     General Appearance:  Well developed, well nourished,alert and oriented x 3, and individual in no acute distress.    Ears/Nose/Mouth/Throat:   Hearing grossly normal.         Neck: Supple. Chest:   Lungs clear to auscultation bilaterally. Cardiovascular:  Regular rate and rhythm, S1, S2 normal, no murmur. Abdomen:   Soft, non-tender, bowel sounds are active. Extremities: No edema bilaterally. Skin: Warm and dry. Data Review:   Labs:  No results found for this or any previous visit (from the past 24 hour(s)). Telemetry: normal sinus rhythm      Assessment:     Principal Problem:    Pericardial tamponade (4/9/2018)    Active Problems:    Paroxysmal atrial fibrillation ([address] (1/31/2018)      Chronic anticoagulation (1/31/2018)      Essential hypertension (1/31/2018)      S/P ablation of atrial fibrillation (4/22/2018)      Cardiac tamponade (4/22/2018)        Plan:     Repeat limited echo before discharge. Continue current therapy.     Nick Short MD

## 2018-04-30 VITALS
SYSTOLIC BLOOD PRESSURE: 110 MMHG | BODY MASS INDEX: 26.15 KG/M2 | HEIGHT: 75 IN | OXYGEN SATURATION: 96 % | TEMPERATURE: 97.7 F | HEART RATE: 87 BPM | WEIGHT: 210.3 LBS | DIASTOLIC BLOOD PRESSURE: 69 MMHG | RESPIRATION RATE: 18 BRPM

## 2018-04-30 PROCEDURE — 93308 TTE F-UP OR LMTD: CPT

## 2018-04-30 PROCEDURE — 97535 SELF CARE MNGMENT TRAINING: CPT | Performed by: OCCUPATIONAL THERAPIST

## 2018-04-30 PROCEDURE — 74011250637 HC RX REV CODE- 250/637: Performed by: INTERNAL MEDICINE

## 2018-04-30 RX ORDER — COLCHICINE 0.6 MG/1
0.6 TABLET ORAL DAILY
Qty: 30 TAB | Refills: 2 | Status: SHIPPED | OUTPATIENT
Start: 2018-04-30 | End: 2018-05-22 | Stop reason: ALTCHOICE

## 2018-04-30 RX ORDER — ONDANSETRON 4 MG/1
4 TABLET, ORALLY DISINTEGRATING ORAL
Qty: 20 TAB | Refills: 0 | Status: SHIPPED | OUTPATIENT
Start: 2018-04-30 | End: 2018-05-22 | Stop reason: ALTCHOICE

## 2018-04-30 RX ADMIN — LEVOTHYROXINE SODIUM 75 MCG: 25 TABLET ORAL at 06:40

## 2018-04-30 RX ADMIN — METOPROLOL SUCCINATE 25 MG: 25 TABLET, EXTENDED RELEASE ORAL at 09:35

## 2018-04-30 RX ADMIN — Medication 10 ML: at 06:00

## 2018-04-30 RX ADMIN — TIMOLOL MALEATE 1 DROP: 5 SOLUTION/ DROPS OPHTHALMIC at 09:35

## 2018-04-30 RX ADMIN — COLCHICINE 0.6 MG: 0.6 TABLET, FILM COATED ORAL at 09:34

## 2018-04-30 RX ADMIN — TAMSULOSIN HYDROCHLORIDE 0.8 MG: 0.4 CAPSULE ORAL at 09:34

## 2018-04-30 RX ADMIN — MULTIPLE VITAMINS W/ MINERALS TAB 1 TABLET: TAB at 09:35

## 2018-04-30 NOTE — PROGRESS NOTES
Problem: Self Care Deficits Care Plan (Adult)  Goal: *Acute Goals and Plan of Care (Insert Text)  Occupational Therapy Goals  Initiated 4/24/2018  1. Patient will perform standing ADLs x 5 minutes without seated rest break with independence within 7 day(s). 2.  Patient will perform upper body dressing and lower body dressing with independence within 7 day(s). 3.  Patient will gather materials for ADL task with independence within 7 day(s). 4.  Patient will perform toilet transfers in bathroom with independence within 7 day(s). 5.  Patient will perform all aspects of toileting with independence within 7 day(s). Occupational Therapy TREATMENT  Patient: Jesse Lazo (64 y.o. male)  Date: 4/30/2018  Diagnosis: Pericardial Effusion  Syncope  Cardiac tamponade Pericardial tamponade       Precautions:    Chart, occupational therapy assessment, plan of care, and goals were reviewed. ASSESSMENT:  Patient is making nice progress in self-care, achieving an overall S to Mod I level. He demonstrates good safety/judgement during ADL. Recommended that he have someone stand by when stepping over the edge of the tub at home. Patient did voice concern over passing out when he wife is not at home. Discussed that they make emergency call buttons (like Life Alert) that have fall detection now. He plans to check into one when he gets home. Continue with plan of care here in the hospital until discharged, but no follow up OT is recommended. Progression toward goals:  [x]       Improving appropriately and progressing toward goals  []       Improving slowly and progressing toward goals  []       Not making progress toward goals and plan of care will be adjusted     PLAN:  Patient continues to benefit from skilled intervention to address the above impairments. Continue treatment per established plan of care.   Discharge Recommendations:  None  Further Equipment Recommendations for Discharge:  None     SUBJECTIVE: Patient stated I'm ready to go home.     OBJECTIVE DATA SUMMARY:   Cognitive/Behavioral Status:  Neurologic State: Alert  Orientation Level: Oriented X4  Cognition: Follows commands; Appropriate safety awareness; Appropriate for age attention/concentration  Perception: Appears intact  Perseveration: No perseveration noted  Safety/Judgement: Awareness of environment; Insight into deficits;Good awareness of safety precautions    Functional Mobility and Transfers for ADLs:  Bed Mobility:  Scooting: Modified independent    Transfers:  Sit to Stand: Modified independent  Functional Transfers  Toilet Transfer : Supervision  Tub Transfer: Stand-by assistance       Balance:  Standing:  Static: Good  Dynamic: Good    ADL Intervention:       Grooming  Washing Hands: Supervision/set-up  Brushing Teeth: Supervision/set-up  Brushing/Combing Hair: Stand-by assistance                   Lower Body Dressing Assistance  Socks: Supervision/set-up  Leg Crossed Method Used: Yes  Position Performed: Seated in chair  Cues: Verbal cues provided    Toileting  Bladder Hygiene: Modified independent  Clothing Management: Supervision/set-up    Cognitive Retraining  Safety/Judgement: Awareness of environment; Insight into deficits;Good awareness of safety precautions     Patient is concerned about passing out at home if his wife isn't at home. Discussed emergency call buttons with have fall detection. He was unaware this option was available. Pain:  Pain Scale 1: Numeric (0 - 10)  Pain Intensity 1: 0              Activity Tolerance:   Good  Please refer to the flowsheet for vital signs taken during this treatment.   After treatment:   [x] Patient left in no apparent distress sitting up in chair  [] Patient left in no apparent distress in bed  [x] Call bell left within reach  [x] Nursing notified  [] Caregiver present  [] Bed alarm activated    COMMUNICATION/COLLABORATION:   The patients plan of care was discussed with: Physical Therapist and Registered Nurse    Nickolas Tian OTR/L  Time Calculation: 24 mins

## 2018-04-30 NOTE — PROGRESS NOTES
[address] [address]  [phone]      4/30/2018 8:58 AM    Admit Date: 4/21/2018    Admit Diagnosis:   Pericardial Effusion  Syncope  Cardiac tamponade    Subjective:     Cynthia Wong denies chest pain, pressure, tightness, dyspnea or lower extremity edema. Cough/phegm resolved. Admits to some nausea this weekend. Visit Vitals    /73 (BP 1 Location: Right arm, BP Patient Position: At rest)    Pulse 91    Temp 97.9 °F (36.6 °C)    Resp 18    Ht 6' 3\" (1.905 m)    Wt 210 lb 4.8 oz (95.4 kg)    SpO2 94%    BMI 26.29 kg/m2       Current Facility-Administered Medications   Medication Dose Route Frequency    ondansetron (ZOFRAN) injection 4 mg  4 mg IntraVENous Q8H PRN    colchicine tablet 0.6 mg  0.6 mg Oral BID    benzonatate (TESSALON) capsule 100 mg  100 mg Oral TID PRN    latanoprost (XALATAN) 0.005 % ophthalmic solution 1 Drop  1 Drop Both Eyes QHS    levothyroxine (SYNTHROID) tablet 75 mcg  75 mcg Oral ACB    metoprolol succinate (TOPROL-XL) XL tablet 25 mg  25 mg Oral DAILY    tamsulosin (FLOMAX) capsule 0.8 mg  0.8 mg Oral DAILY    timolol (TIMOPTIC) 0.5 % ophthalmic solution 1 Drop  1 Drop Both Eyes DAILY    multivitamin, tx-iron-ca-min (THERA-M w/ IRON) tablet 1 Tab  1 Tab Oral DAILY    sodium chloride (NS) flush 5-10 mL  5-10 mL IntraVENous Q8H    sodium chloride (NS) flush 5-10 mL  5-10 mL IntraVENous PRN    acetaminophen (TYLENOL) solution 650 mg  650 mg Oral Q4H PRN    chlorpheniramine-HYDROcodone (TUSSIONEX) oral suspension 5 mL  5 mL Oral Q12H PRN    HYDROcodone-acetaminophen (NORCO)  mg tablet 1 Tab  1 Tab Oral Q4H PRN       Objective:      Physical Exam:  General Appearance:    Chest:   Clear  Cardiovascular:  Regular rate and rhythm, S1, S2 normal, no murmur. Abdomen:   Soft, non-tender, bowel sounds are active. Extremities: tr edema  Skin:  Warm and dry.      Data Review:   Recent Labs      04/28/18   0158   WBC  7.9   HGB 11.6*   HCT  34.6*   PLT  293     Recent Labs      04/28/18   0158   NA  136   K  4.0   CL  103   CO2  28   GLU  119*   BUN  17   CREA  0.84   CA  8.1*   MG  2.1   PHOS  3.8   ALB  2.7*   TBILI  0.5   SGOT  32   ALT  106*       No results for input(s): TROIQ, CPK, CKMB in the last 72 hours. Intake/Output Summary (Last 24 hours) at 04/30/18 0858  Last data filed at 04/30/18 0431   Gross per 24 hour   Intake              540 ml   Output             1600 ml   Net            -1060 ml        Telemetry: sinus rhythm, ventricular rate 90s. Assessment:     Principal Problem:    Pericardial tamponade (4/9/2018)    Active Problems:    Paroxysmal atrial fibrillation ([address] (1/31/2018)      Chronic anticoagulation (1/31/2018)      Essential hypertension (1/31/2018)      S/P ablation of atrial fibrillation (4/22/2018)      Cardiac tamponade (4/22/2018)        Plan:     Sal Cruz is a pleasant 78year old male, S/P Cardiac tamponade and with syncope on presentation, S/P therapeutic pericardiocentesis. Has remained in sinus off cardizem drip. BPs stable. His drain dressing is dry and intact. Aside from nausea he feels well. Awaiting limited echo and possible discharge.      CURTIS Salinas MD, [facility]  4/30/2018

## 2018-04-30 NOTE — PROGRESS NOTES
0700: Bedside report received from [facility]. Assumed care of patient. 1600: Discharge instructions reviewed with patient including follow up appointments, medications and side effects. Patient and family verbalized understanding of information. PIV and telemetry box removed. Patient stable for discharge at this time.

## 2018-04-30 NOTE — PROGRESS NOTES
Discharge note    Pt is discharging today with [address] with PT/OT and skilled nursing. Pt is to Discharge with wife in private vehicle to his home. No further CM needs at this time. Care Management Interventions  PCP Verified by CM:  Yes  Mode of Transport at Discharge:  (Wife in private vehicle)  Transition of [address] (CM Consult): [address] ([address] PT/OT skilled Nursing)  [address]: Yes  Physical Therapy Consult: Yes  Occupational Therapy Consult: Yes  Current Support Network:  (Lives with his wife in a two story home and independent with ADL's He drives and is active at baseline. )  Confirm Follow Up Transport: Family  Plan discussed with Pt/Family/Caregiver: Yes  Freedom of Choice Offered: Yes        Kelsey Jade, [facility],   [address]  [phone]

## 2018-04-30 NOTE — DISCHARGE SUMMARY
Cardiac Electrophysiology Discharge Summary             [address] [address]  [phone]    Patient ID:  Moody Ortega  [id number]  9 y.o.  1940    Admit Date: 4/21/2018    Discharge Date: 4/30/2018     Admitting Physician: Sweta Castellano MD     Discharge Physician: KACIE Watkins    Admission Diagnoses: Pericardial Effusion  Syncope  Cardiac tamponade    Discharge Diagnoses: Principal Problem:    Pericardial tamponade (4/9/2018)    Active Problems:    Paroxysmal atrial fibrillation ([address] (1/31/2018)      Chronic anticoagulation (1/31/2018)      Essential hypertension (1/31/2018)      S/P ablation of atrial fibrillation (4/22/2018)      Cardiac tamponade (4/22/2018)        Discharge Condition: Good    Cardiology Procedures this Admission:  Tamponade s/p pericardiocentesis. Hospital Course: Moody Ortega is a 79 yo recently underwent ablation and implantation of loop monitor at [facility] 7/9/09, this was complicated with pericardial tamponade managed with pericardiocentesis. Pt complained of fatigue, persistent dyspnea, persistent cough, with 10lb weight gain. On the 21st he states his abdomen had been distended but no pain. He was able to walk out of house after getting home from ED but on walking back into house and soon after sitting down, became nauseated, vomited and blacked out. This happened twice and thus brought back to ED by wife. Lives in the [facility] [place] [facility] [place]  Where BP was as low as 80's and as low as 19'E during helicopter transfer for suspected tamponade. Transferred here where echo here confirmed same with + pulsus paradoxicus on exam. Priyanka Leavitt  Had emergent pericardiocentesis and subsequent cardiac surgical consult, felt to be pericarditis. Pt was treated with steroids and colchicine while he continued to drain. Drain was pulled on the 27th. Repeat echo without any effusion, EF noted to be 45%.      Consults: Pulmonary/Intensive care, cardiac surgery    Discharge Exam:  Visit Vitals    /69 (BP 1 Location: Right arm, BP Patient Position: At rest)    Pulse 87    Temp 97.7 °F (36.5 °C)    Resp 18    Ht 6' 3\" (1.905 m)    Wt 210 lb 4.8 oz (95.4 kg)    SpO2 96%    BMI 26.29 kg/m2       Abdomen: soft, non-tender  Extremities: extremities normal  Heart: regular rate and rhythm  Lungs: clear to auscultation bilaterally  Neck: no JVD  Neurologic: Grossly normal  Pulses: 2+ and symmetric    Disposition: home    Patient Instructions:   Current Discharge Medication List      START taking these medications    Details   colchicine 0.6 mg tablet Take 1 Tab by mouth daily. Qty: 30 Tab, Refills: 2      ondansetron (ZOFRAN ODT) 4 mg disintegrating tablet Take 1 Tab by mouth every eight (8) hours as needed for Nausea. Qty: 20 Tab, Refills: 0         CONTINUE these medications which have NOT CHANGED    Details   VIT C/E/ZN/COPPR/LUTEIN/ZEAXAN (PRESERVISION AREDS 2 PO) Take  by mouth two (2) times a day. Associated Diagnoses: Essential hypertension      latanoprost (XALATAN) 0.005 % ophthalmic solution Administer 1 Drop to both eyes nightly. Associated Diagnoses: Essential hypertension      aspirin 81 mg chewable tablet Take 1 Tab by mouth daily. levothyroxine (SYNTHROID) 75 mcg tablet Take 1 Tab by mouth Daily (before breakfast). rivaroxaban (XARELTO) 20 mg tab tablet Take 1 Tab by mouth daily (with dinner). Indications: PREVENT THROMBOEMBOLISM IN CHRONIC ATRIAL FIBRILLATION      losartan (COZAAR) 100 mg tablet Take 100 mg by mouth daily. metoprolol succinate (TOPROL-XL) 25 mg XL tablet Take 25 mg by mouth daily. tamsulosin (FLOMAX) 0.4 mg capsule Take 0.8 mg by mouth daily. timolol (TIMOPTIC) 0.5 % ophthalmic solution Administer 1 Drop to both eyes daily.          STOP taking these medications       cephALEXin (KEFLEX) 250 mg capsule Comments:   Reason for Stopping:         benzonatate (TESSALON) 100 mg capsule Comments: Reason for Stopping:               Referenced discharge instructions provided by nursing for diet and activity. Follow-up with KACIE Frost/Augusto Wheat MD in 1 week.          Signed:    CURTIS Frost MD, [facility]  4/30/2018  2:43 PM

## 2018-04-30 NOTE — PROGRESS NOTES
[facility] [address] [place] [id number] referral sent via "[employer] Awaiting Response.     Omre Hyde, [address] [place] [address]  [phone]

## 2018-04-30 NOTE — ROUTINE PROCESS
PCP KARINE appt scheduled with Dr. Leonela Maria on May 3 at 1:00pm. Appt added to [address] CM Specialist

## 2018-04-30 NOTE — PROGRESS NOTES
FOC offered and placed on chart. CM notified by [facility] that [facility]. [facility] called  to inform CM that they can accept Pt however it would only be for PT and skilled nursing.  was also informed that PT would be on delay unitl 5/7/18.  called [facility] to determine if they would be able to service Pt's area.  was notified that they did not cover  area. CM contacted [address] [place] [address]. Pt was accepted.  was informed that Nursing services will be provided within 24 to 48 hours of discharge and  PT would be delayed until sometime next week.           Quentin Naylor, [facility], [employer]  [phone]

## 2018-05-01 ENCOUNTER — HOME HEALTH ADMISSION (OUTPATIENT)
Dept: HOME HEALTH SERVICES | Facility: HOME HEALTH | Age: 78
End: 2018-05-01
Payer: MEDICARE

## 2018-05-01 ENCOUNTER — PATIENT OUTREACH (OUTPATIENT)
Dept: CARDIOLOGY CLINIC | Age: 78
End: 2018-05-01

## 2018-05-04 ENCOUNTER — HOME CARE VISIT (OUTPATIENT)
Dept: SCHEDULING | Facility: HOME HEALTH | Age: 78
End: 2018-05-04
Payer: MEDICARE

## 2018-05-04 PROCEDURE — 400013 HH SOC

## 2018-05-04 PROCEDURE — 3331090001 HH PPS REVENUE CREDIT

## 2018-05-04 PROCEDURE — 3331090002 HH PPS REVENUE DEBIT

## 2018-05-04 PROCEDURE — G0299 HHS/HOSPICE OF RN EA 15 MIN: HCPCS

## 2018-05-05 ENCOUNTER — HOME CARE VISIT (OUTPATIENT)
Dept: SCHEDULING | Facility: HOME HEALTH | Age: 78
End: 2018-05-05
Payer: MEDICARE

## 2018-05-05 VITALS
OXYGEN SATURATION: 97 % | TEMPERATURE: 97.9 F | SYSTOLIC BLOOD PRESSURE: 122 MMHG | RESPIRATION RATE: 26 BRPM | DIASTOLIC BLOOD PRESSURE: 78 MMHG | BODY MASS INDEX: 25.12 KG/M2 | WEIGHT: 202 LBS | HEART RATE: 92 BPM | HEIGHT: 75 IN

## 2018-05-05 VITALS
HEART RATE: 88 BPM | DIASTOLIC BLOOD PRESSURE: 56 MMHG | WEIGHT: 200.4 LBS | RESPIRATION RATE: 24 BRPM | BODY MASS INDEX: 25.05 KG/M2 | TEMPERATURE: 97.5 F | SYSTOLIC BLOOD PRESSURE: 84 MMHG | OXYGEN SATURATION: 97 %

## 2018-05-05 PROCEDURE — 3331090002 HH PPS REVENUE DEBIT

## 2018-05-05 PROCEDURE — G0299 HHS/HOSPICE OF RN EA 15 MIN: HCPCS

## 2018-05-05 PROCEDURE — 3331090001 HH PPS REVENUE CREDIT

## 2018-05-06 ENCOUNTER — HOME CARE VISIT (OUTPATIENT)
Dept: SCHEDULING | Facility: HOME HEALTH | Age: 78
End: 2018-05-06
Payer: MEDICARE

## 2018-05-06 VITALS
WEIGHT: 200 LBS | OXYGEN SATURATION: 98 % | SYSTOLIC BLOOD PRESSURE: 80 MMHG | RESPIRATION RATE: 26 BRPM | TEMPERATURE: 97.5 F | BODY MASS INDEX: 25 KG/M2 | DIASTOLIC BLOOD PRESSURE: 60 MMHG | HEART RATE: 102 BPM

## 2018-05-06 PROCEDURE — 3331090001 HH PPS REVENUE CREDIT

## 2018-05-06 PROCEDURE — 3331090002 HH PPS REVENUE DEBIT

## 2018-05-06 PROCEDURE — G0299 HHS/HOSPICE OF RN EA 15 MIN: HCPCS

## 2018-05-07 ENCOUNTER — HOME CARE VISIT (OUTPATIENT)
Dept: SCHEDULING | Facility: HOME HEALTH | Age: 78
End: 2018-05-07
Payer: MEDICARE

## 2018-05-07 PROCEDURE — G0299 HHS/HOSPICE OF RN EA 15 MIN: HCPCS

## 2018-05-07 PROCEDURE — 3331090002 HH PPS REVENUE DEBIT

## 2018-05-07 PROCEDURE — 3331090001 HH PPS REVENUE CREDIT

## 2018-05-08 VITALS
DIASTOLIC BLOOD PRESSURE: 74 MMHG | BODY MASS INDEX: 25 KG/M2 | SYSTOLIC BLOOD PRESSURE: 126 MMHG | HEART RATE: 86 BPM | TEMPERATURE: 97.2 F | RESPIRATION RATE: 20 BRPM | WEIGHT: 200 LBS | OXYGEN SATURATION: 98 %

## 2018-05-08 PROCEDURE — 3331090001 HH PPS REVENUE CREDIT

## 2018-05-08 PROCEDURE — 3331090002 HH PPS REVENUE DEBIT

## 2018-05-09 ENCOUNTER — HOME CARE VISIT (OUTPATIENT)
Dept: SCHEDULING | Facility: HOME HEALTH | Age: 78
End: 2018-05-09
Payer: MEDICARE

## 2018-05-09 PROCEDURE — 3331090001 HH PPS REVENUE CREDIT

## 2018-05-09 PROCEDURE — G0299 HHS/HOSPICE OF RN EA 15 MIN: HCPCS

## 2018-05-09 PROCEDURE — 3331090002 HH PPS REVENUE DEBIT

## 2018-05-10 ENCOUNTER — HOME CARE VISIT (OUTPATIENT)
Dept: HOME HEALTH SERVICES | Facility: HOME HEALTH | Age: 78
End: 2018-05-10
Payer: MEDICARE

## 2018-05-10 ENCOUNTER — HOME CARE VISIT (OUTPATIENT)
Dept: SCHEDULING | Facility: HOME HEALTH | Age: 78
End: 2018-05-10
Payer: MEDICARE

## 2018-05-10 VITALS
RESPIRATION RATE: 18 BRPM | SYSTOLIC BLOOD PRESSURE: 136 MMHG | OXYGEN SATURATION: 98 % | DIASTOLIC BLOOD PRESSURE: 86 MMHG | HEART RATE: 89 BPM | TEMPERATURE: 98.5 F

## 2018-05-10 VITALS
TEMPERATURE: 97.1 F | WEIGHT: 200 LBS | DIASTOLIC BLOOD PRESSURE: 72 MMHG | SYSTOLIC BLOOD PRESSURE: 122 MMHG | BODY MASS INDEX: 25 KG/M2 | OXYGEN SATURATION: 99 % | HEART RATE: 86 BPM | RESPIRATION RATE: 20 BRPM

## 2018-05-10 VITALS
SYSTOLIC BLOOD PRESSURE: 112 MMHG | RESPIRATION RATE: 18 BRPM | HEART RATE: 87 BPM | OXYGEN SATURATION: 98 % | TEMPERATURE: 98.5 F | DIASTOLIC BLOOD PRESSURE: 72 MMHG

## 2018-05-10 PROCEDURE — G0152 HHCP-SERV OF OT,EA 15 MIN: HCPCS

## 2018-05-10 PROCEDURE — 3331090002 HH PPS REVENUE DEBIT

## 2018-05-10 PROCEDURE — 3331090001 HH PPS REVENUE CREDIT

## 2018-05-10 PROCEDURE — G0151 HHCP-SERV OF PT,EA 15 MIN: HCPCS

## 2018-05-11 ENCOUNTER — HOME CARE VISIT (OUTPATIENT)
Dept: SCHEDULING | Facility: HOME HEALTH | Age: 78
End: 2018-05-11
Payer: MEDICARE

## 2018-05-11 PROCEDURE — 3331090001 HH PPS REVENUE CREDIT

## 2018-05-11 PROCEDURE — 3331090002 HH PPS REVENUE DEBIT

## 2018-05-11 PROCEDURE — G0299 HHS/HOSPICE OF RN EA 15 MIN: HCPCS

## 2018-05-12 PROCEDURE — 3331090002 HH PPS REVENUE DEBIT

## 2018-05-12 PROCEDURE — 3331090001 HH PPS REVENUE CREDIT

## 2018-05-13 PROCEDURE — 3331090001 HH PPS REVENUE CREDIT

## 2018-05-13 PROCEDURE — 3331090002 HH PPS REVENUE DEBIT

## 2018-05-14 ENCOUNTER — HOME CARE VISIT (OUTPATIENT)
Dept: SCHEDULING | Facility: HOME HEALTH | Age: 78
End: 2018-05-14
Payer: MEDICARE

## 2018-05-14 VITALS
OXYGEN SATURATION: 98 % | DIASTOLIC BLOOD PRESSURE: 72 MMHG | WEIGHT: 200 LBS | TEMPERATURE: 97.4 F | SYSTOLIC BLOOD PRESSURE: 115 MMHG | RESPIRATION RATE: 20 BRPM | BODY MASS INDEX: 25 KG/M2 | HEART RATE: 88 BPM

## 2018-05-14 PROCEDURE — 3331090001 HH PPS REVENUE CREDIT

## 2018-05-14 PROCEDURE — 3331090002 HH PPS REVENUE DEBIT

## 2018-05-14 PROCEDURE — G0299 HHS/HOSPICE OF RN EA 15 MIN: HCPCS

## 2018-05-15 ENCOUNTER — HOME CARE VISIT (OUTPATIENT)
Dept: SCHEDULING | Facility: HOME HEALTH | Age: 78
End: 2018-05-15
Payer: MEDICARE

## 2018-05-15 VITALS
TEMPERATURE: 97.4 F | OXYGEN SATURATION: 98 % | BODY MASS INDEX: 25 KG/M2 | DIASTOLIC BLOOD PRESSURE: 70 MMHG | HEART RATE: 92 BPM | RESPIRATION RATE: 20 BRPM | SYSTOLIC BLOOD PRESSURE: 110 MMHG | WEIGHT: 200 LBS

## 2018-05-15 VITALS
RESPIRATION RATE: 18 BRPM | HEART RATE: 89 BPM | SYSTOLIC BLOOD PRESSURE: 119 MMHG | OXYGEN SATURATION: 98 % | DIASTOLIC BLOOD PRESSURE: 69 MMHG | TEMPERATURE: 97.7 F

## 2018-05-15 PROCEDURE — 3331090002 HH PPS REVENUE DEBIT

## 2018-05-15 PROCEDURE — G0157 HHC PT ASSISTANT EA 15: HCPCS

## 2018-05-15 PROCEDURE — 3331090001 HH PPS REVENUE CREDIT

## 2018-05-16 PROCEDURE — 3331090001 HH PPS REVENUE CREDIT

## 2018-05-16 PROCEDURE — 3331090002 HH PPS REVENUE DEBIT

## 2018-05-17 ENCOUNTER — HOME CARE VISIT (OUTPATIENT)
Dept: SCHEDULING | Facility: HOME HEALTH | Age: 78
End: 2018-05-17
Payer: MEDICARE

## 2018-05-17 ENCOUNTER — HOME CARE VISIT (OUTPATIENT)
Dept: HOME HEALTH SERVICES | Facility: HOME HEALTH | Age: 78
End: 2018-05-17
Payer: MEDICARE

## 2018-05-17 VITALS
DIASTOLIC BLOOD PRESSURE: 70 MMHG | OXYGEN SATURATION: 98 % | RESPIRATION RATE: 18 BRPM | HEART RATE: 92 BPM | TEMPERATURE: 98 F | SYSTOLIC BLOOD PRESSURE: 122 MMHG

## 2018-05-17 PROCEDURE — G0152 HHCP-SERV OF OT,EA 15 MIN: HCPCS

## 2018-05-17 PROCEDURE — 3331090001 HH PPS REVENUE CREDIT

## 2018-05-17 PROCEDURE — 3331090002 HH PPS REVENUE DEBIT

## 2018-05-17 PROCEDURE — G0299 HHS/HOSPICE OF RN EA 15 MIN: HCPCS

## 2018-05-18 ENCOUNTER — HOME CARE VISIT (OUTPATIENT)
Dept: SCHEDULING | Facility: HOME HEALTH | Age: 78
End: 2018-05-18
Payer: MEDICARE

## 2018-05-18 VITALS
OXYGEN SATURATION: 99 % | HEART RATE: 97 BPM | DIASTOLIC BLOOD PRESSURE: 71 MMHG | RESPIRATION RATE: 18 BRPM | SYSTOLIC BLOOD PRESSURE: 112 MMHG | TEMPERATURE: 97.1 F

## 2018-05-18 PROCEDURE — 3331090001 HH PPS REVENUE CREDIT

## 2018-05-18 PROCEDURE — 3331090002 HH PPS REVENUE DEBIT

## 2018-05-18 PROCEDURE — G0157 HHC PT ASSISTANT EA 15: HCPCS

## 2018-05-19 PROCEDURE — 3331090001 HH PPS REVENUE CREDIT

## 2018-05-19 PROCEDURE — 3331090002 HH PPS REVENUE DEBIT

## 2018-05-20 PROCEDURE — 3331090002 HH PPS REVENUE DEBIT

## 2018-05-20 PROCEDURE — 3331090001 HH PPS REVENUE CREDIT

## 2018-05-21 ENCOUNTER — HOME CARE VISIT (OUTPATIENT)
Dept: HOME HEALTH SERVICES | Facility: HOME HEALTH | Age: 78
End: 2018-05-21
Payer: MEDICARE

## 2018-05-21 ENCOUNTER — HOME CARE VISIT (OUTPATIENT)
Dept: SCHEDULING | Facility: HOME HEALTH | Age: 78
End: 2018-05-21
Payer: MEDICARE

## 2018-05-21 PROCEDURE — G0299 HHS/HOSPICE OF RN EA 15 MIN: HCPCS

## 2018-05-21 PROCEDURE — 3331090002 HH PPS REVENUE DEBIT

## 2018-05-21 PROCEDURE — 3331090001 HH PPS REVENUE CREDIT

## 2018-05-22 ENCOUNTER — OFFICE VISIT (OUTPATIENT)
Dept: CARDIOLOGY CLINIC | Age: 78
End: 2018-05-22

## 2018-05-22 VITALS
DIASTOLIC BLOOD PRESSURE: 80 MMHG | HEIGHT: 75 IN | RESPIRATION RATE: 16 BRPM | HEART RATE: 85 BPM | WEIGHT: 202.8 LBS | SYSTOLIC BLOOD PRESSURE: 118 MMHG | BODY MASS INDEX: 25.22 KG/M2 | OXYGEN SATURATION: 98 %

## 2018-05-22 VITALS
DIASTOLIC BLOOD PRESSURE: 64 MMHG | SYSTOLIC BLOOD PRESSURE: 108 MMHG | RESPIRATION RATE: 20 BRPM | OXYGEN SATURATION: 98 % | WEIGHT: 200 LBS | TEMPERATURE: 98.2 F | DIASTOLIC BLOOD PRESSURE: 72 MMHG | HEART RATE: 74 BPM | BODY MASS INDEX: 25 KG/M2 | TEMPERATURE: 98.1 F | RESPIRATION RATE: 20 BRPM | OXYGEN SATURATION: 98 % | SYSTOLIC BLOOD PRESSURE: 110 MMHG | HEART RATE: 78 BPM

## 2018-05-22 DIAGNOSIS — Z86.79 S/P ABLATION OF ATRIAL FIBRILLATION: Primary | ICD-10-CM

## 2018-05-22 DIAGNOSIS — I50.22 SYSTOLIC CHF, CHRONIC (HCC): Chronic | ICD-10-CM

## 2018-05-22 DIAGNOSIS — I48.0 PAROXYSMAL ATRIAL FIBRILLATION (HCC): Primary | Chronic | ICD-10-CM

## 2018-05-22 DIAGNOSIS — I31.4 CARDIAC TAMPONADE: ICD-10-CM

## 2018-05-22 DIAGNOSIS — I10 ESSENTIAL HYPERTENSION: Chronic | ICD-10-CM

## 2018-05-22 DIAGNOSIS — I48.91 ATRIAL FIBRILLATION, UNSPECIFIED TYPE (HCC): ICD-10-CM

## 2018-05-22 DIAGNOSIS — Z98.890 S/P ABLATION OF ATRIAL FIBRILLATION: Primary | ICD-10-CM

## 2018-05-22 DIAGNOSIS — Z79.01 CHRONIC ANTICOAGULATION: Chronic | ICD-10-CM

## 2018-05-22 PROCEDURE — 3331090001 HH PPS REVENUE CREDIT

## 2018-05-22 PROCEDURE — 3331090002 HH PPS REVENUE DEBIT

## 2018-05-22 NOTE — PROGRESS NOTES
Chief Complaint   Patient presents with   [facility] Follow Up     c/o tiredness     1. Have you been to the ER, urgent care clinic since your last visit? Hospitalized since your last visit? No    2. Have you seen or consulted any other health care providers outside of the [facility] since your last visit? Include any pap smears or colon screening.  Yes When: PCP hospital follow up

## 2018-05-22 NOTE — PROGRESS NOTES
Subjective:      Shelby Plasencia is a 78 y.o. male is here for EP consult. The patient denies chest pain/ shortness of breath, orthopnea, PND, LE edema, palpitations, syncope, presyncope. Continues to have some fatigue. Working with PT still. Patient Active Problem List    Diagnosis Date Noted    S/P ablation of atrial fibrillation 04/22/2018    Cardiac tamponade 04/22/2018    A-fib ([address] 04/09/2018    Pericardial tamponade 04/09/2018    TIA (transient ischemic attack) 02/01/2018    Broca's aphasia 01/31/2018    Paroxysmal atrial fibrillation ([address] 01/31/2018    Chronic anticoagulation 43/59/2205    Systolic CHF, chronic ([facility] 01/31/2018    Acquired hypothyroidism 01/31/2018    Essential hypertension 01/31/2018      Lynnette Greer MD  Past Medical History:   Diagnosis Date    Atrial fibrillation [facility])     CAD (coronary artery disease)     Headache     Hypertension     Hypothyroidism     Stroke [facility])       Past Surgical History:   Procedure Laterality Date    CARDIAC SURG PROCEDURE UNLIST  04/09/2018    cardiac ablation, implant permanent cardiac monitor-[employer]    CATH PERICARDIOCENTESIS  4/22/2018         HX APPENDECTOMY  1947    HX CATARACT REMOVAL  2009 x 2    HX THYROIDECTOMY  1950 Partial,    2003 Full     Allergies   Allergen Reactions    Chocolate Flavor Other (comments)     migraines      History reviewed. No pertinent family history. negative for cardiac disease  Social History     Social History    Marital status:      Spouse name: N/A    Number of children: N/A    Years of education: N/A     Social History Main Topics    Smoking status: Never Smoker    Smokeless tobacco: Never Used    Alcohol use No    Drug use: No    Sexual activity: No     Other Topics Concern    None     Social History Narrative     Current Outpatient Prescriptions   Medication Sig    VIT C/E/ZN/COPPR/LUTEIN/ZEAXAN (PRESERVISION AREDS 2 PO) Take  by mouth two (2) times a day.     latanoprost (XALATAN) 0.005 % ophthalmic solution Administer 1 Drop to both eyes nightly.  aspirin 81 mg chewable tablet Take 1 Tab by mouth daily.  levothyroxine (SYNTHROID) 75 mcg tablet Take 1 Tab by mouth Daily (before breakfast).  rivaroxaban (XARELTO) 20 mg tab tablet Take 1 Tab by mouth daily (with dinner). Indications: PREVENT THROMBOEMBOLISM IN CHRONIC ATRIAL FIBRILLATION    metoprolol succinate (TOPROL-XL) 25 mg XL tablet Take 25 mg by mouth daily.  tamsulosin (FLOMAX) 0.4 mg capsule Take 0.8 mg by mouth daily.  timolol (TIMOPTIC) 0.5 % ophthalmic solution Administer 1 Drop to both eyes daily. No current facility-administered medications for this visit. Vitals:    05/22/18 1403   BP: 118/80   Pulse: 85   Resp: 16   SpO2: 98%   Weight: 202 lb 12.8 oz (92 kg)   Height: 6' 3\" (1.905 m)       I have reviewed the nurses notes, vitals, problem list, allergy list, medical history, family, social history and medications. Review of Symptoms:    General: Pt denies excessive weight gain or loss. Pt is able to conduct ADL's  HEENT: Denies blurred vision, headaches, epistaxis and difficulty swallowing. Respiratory: Denies shortness of breath, DOWNS, wheezing or stridor. Cardiovascular: Denies precordial pain, palpitations, edema or PND  Gastrointestinal: Denies poor appetite, indigestion, abdominal pain or blood in stool  Urinary: Denies dysuria, pyuria  Musculoskeletal: Denies pain or swelling from muscles or joints  Neurologic: Denies tremor, paresthesias, or sensory motor disturbance  Skin: Denies rash, itching or texture change. Psych: Denies depression      Physical Exam:      General: Well developed, in no acute distress. HEENT: Eyes - PERRL, no jvd  Heart:  Normal S1/S2 negative S3 or S4. Regular, no murmur, gallop or rub.   Respiratory: Clear bilaterally x 4, no wheezing or rales  Extremities:  No edema, normal cap refill, no cyanosis.   Musculoskeletal: No clubbing  Neuro: A&Ox3, speech clear, gait stable. Skin: Skin color is normal. No rashes or lesions. Non diaphoretic  Vascular: 2+ pulses symmetric in all extremities    Cardiographics    Ekg: Sinus  Rhythm   -  Nonspecific T-abnormality. Ventricular rate 86. Results for orders placed or performed during the hospital encounter of 04/21/18   EKG, 12 LEAD, INITIAL   Result Value Ref Range    Ventricular Rate 87 BPM    Atrial Rate 87 BPM    P-R Interval 172 ms    QRS Duration 100 ms    Q-T Interval 386 ms    QTC Calculation (Bezet) 464 ms    Calculated P Axis 47 degrees    Calculated R Axis 29 degrees    Calculated T Axis 51 degrees    Diagnosis       Normal sinus rhythm  Cannot rule out Anterior infarct (cited on or before 21-APR-2018)  Confirmed by [employer] Master ([id number]) on 4/25/2018 6:53:03 AM           Lab Results   Component Value Date/Time    WBC 7.9 04/28/2018 01:58 AM    HGB 11.6 (L) 04/28/2018 01:58 AM    HCT 34.6 (L) 04/28/2018 01:58 AM    PLATELET 706 13/59/2127 01:58 AM    MCV 84.4 04/28/2018 01:58 AM      Lab Results   Component Value Date/Time    Sodium 136 04/28/2018 01:58 AM    Potassium 4.0 04/28/2018 01:58 AM    Chloride 103 04/28/2018 01:58 AM    CO2 28 04/28/2018 01:58 AM    Anion gap 5 04/28/2018 01:58 AM    Glucose 119 (H) 04/28/2018 01:58 AM    BUN 17 04/28/2018 01:58 AM    Creatinine 0.84 04/28/2018 01:58 AM    BUN/Creatinine ratio 20 04/28/2018 01:58 AM    GFR est AA >60 04/28/2018 01:58 AM    GFR est non-AA >60 04/28/2018 01:58 AM    Calcium 8.1 (L) 04/28/2018 01:58 AM    Bilirubin, total 0.5 04/28/2018 01:58 AM    AST (SGOT) 32 04/28/2018 01:58 AM    Alk.  phosphatase 92 04/28/2018 01:58 AM    Protein, total 5.7 (L) 04/28/2018 01:58 AM    Albumin 2.7 (L) 04/28/2018 01:58 AM    Globulin 3.0 04/28/2018 01:58 AM    A-G Ratio 0.9 (L) 04/28/2018 01:58 AM    ALT (SGPT) 106 (H) 04/28/2018 01:58 AM      Lab Results   Component Value Date/Time    TSH 1.07 04/21/2018 08:15 PM        Assessment:            ICD-10-CM ICD-9-CM    1. Paroxysmal atrial fibrillation (HCC) I48.0 427.31 AMB POC EKG ROUTINE W/ 12 LEADS, INTER & REP   2. Systolic CHF, chronic (HCC) I50.22 428.22      428.0    3. Essential hypertension I10 401.9    4. Chronic anticoagulation Z79.01 V58.61      Orders Placed This Encounter    AMB POC EKG ROUTINE W/ 12 LEADS, INTER & REP     Order Specific Question:   Reason for Exam:     Answer:   routine        Plan:     Malika hernandez 79 yo recently underwent ablation and implantation of loop monitor at [facility], this was complicated with pericardial tamponade managed with pericardiocentesis. He has not had any atrial fibrillation since April 23rd 2018. He is experiencing constipation from zofran that he takes with colchicine. Will d/c both as he is > 1 mo post tamponade. We will see him back in 2 months. Cont med rx for htn and cad. Cont oac for AF. Continue medical management for HTN, PAF and CAD. Thank you for allowing me to participate in Adriana Soares 's care.       CURTIS Gipson MD, [facility], [facility]

## 2018-05-22 NOTE — PROGRESS NOTES
See device report- MDT ILR in office device check, will need to do manual device sends for remote home monitoring.

## 2018-05-23 PROCEDURE — 3331090001 HH PPS REVENUE CREDIT

## 2018-05-23 PROCEDURE — 3331090002 HH PPS REVENUE DEBIT

## 2018-05-24 ENCOUNTER — HOME CARE VISIT (OUTPATIENT)
Dept: SCHEDULING | Facility: HOME HEALTH | Age: 78
End: 2018-05-24
Payer: MEDICARE

## 2018-05-24 VITALS
HEART RATE: 101 BPM | TEMPERATURE: 98.1 F | SYSTOLIC BLOOD PRESSURE: 118 MMHG | RESPIRATION RATE: 18 BRPM | OXYGEN SATURATION: 98 % | DIASTOLIC BLOOD PRESSURE: 74 MMHG

## 2018-05-24 VITALS
DIASTOLIC BLOOD PRESSURE: 68 MMHG | OXYGEN SATURATION: 97 % | SYSTOLIC BLOOD PRESSURE: 101 MMHG | TEMPERATURE: 97.4 F | HEART RATE: 102 BPM

## 2018-05-24 PROCEDURE — G0157 HHC PT ASSISTANT EA 15: HCPCS

## 2018-05-24 PROCEDURE — 3331090001 HH PPS REVENUE CREDIT

## 2018-05-24 PROCEDURE — 3331090002 HH PPS REVENUE DEBIT

## 2018-05-24 PROCEDURE — G0152 HHCP-SERV OF OT,EA 15 MIN: HCPCS

## 2018-05-24 PROCEDURE — G0299 HHS/HOSPICE OF RN EA 15 MIN: HCPCS

## 2018-05-25 VITALS
SYSTOLIC BLOOD PRESSURE: 118 MMHG | RESPIRATION RATE: 20 BRPM | OXYGEN SATURATION: 98 % | TEMPERATURE: 98.1 F | HEART RATE: 101 BPM | DIASTOLIC BLOOD PRESSURE: 74 MMHG

## 2018-05-25 PROCEDURE — 3331090001 HH PPS REVENUE CREDIT

## 2018-05-25 PROCEDURE — 3331090002 HH PPS REVENUE DEBIT

## 2018-05-26 PROCEDURE — 3331090002 HH PPS REVENUE DEBIT

## 2018-05-26 PROCEDURE — 3331090001 HH PPS REVENUE CREDIT

## 2018-05-27 PROCEDURE — 3331090002 HH PPS REVENUE DEBIT

## 2018-05-27 PROCEDURE — 3331090001 HH PPS REVENUE CREDIT

## 2018-05-28 PROCEDURE — 3331090002 HH PPS REVENUE DEBIT

## 2018-05-28 PROCEDURE — 3331090001 HH PPS REVENUE CREDIT

## 2018-05-29 ENCOUNTER — HOME CARE VISIT (OUTPATIENT)
Dept: HOME HEALTH SERVICES | Facility: HOME HEALTH | Age: 78
End: 2018-05-29
Payer: MEDICARE

## 2018-05-29 PROCEDURE — 3331090001 HH PPS REVENUE CREDIT

## 2018-05-29 PROCEDURE — 3331090002 HH PPS REVENUE DEBIT

## 2018-05-30 PROCEDURE — 3331090001 HH PPS REVENUE CREDIT

## 2018-05-30 PROCEDURE — 3331090002 HH PPS REVENUE DEBIT

## 2018-05-31 ENCOUNTER — HOME CARE VISIT (OUTPATIENT)
Dept: HOME HEALTH SERVICES | Facility: HOME HEALTH | Age: 78
End: 2018-05-31
Payer: MEDICARE

## 2018-05-31 ENCOUNTER — HOME CARE VISIT (OUTPATIENT)
Dept: SCHEDULING | Facility: HOME HEALTH | Age: 78
End: 2018-05-31
Payer: MEDICARE

## 2018-05-31 VITALS
DIASTOLIC BLOOD PRESSURE: 76 MMHG | SYSTOLIC BLOOD PRESSURE: 118 MMHG | OXYGEN SATURATION: 98 % | TEMPERATURE: 97.5 F | RESPIRATION RATE: 16 BRPM | HEART RATE: 91 BPM

## 2018-05-31 PROCEDURE — 3331090002 HH PPS REVENUE DEBIT

## 2018-05-31 PROCEDURE — G0157 HHC PT ASSISTANT EA 15: HCPCS

## 2018-05-31 PROCEDURE — 3331090001 HH PPS REVENUE CREDIT

## 2018-06-01 PROCEDURE — 3331090001 HH PPS REVENUE CREDIT

## 2018-06-01 PROCEDURE — 3331090002 HH PPS REVENUE DEBIT

## 2018-06-02 PROCEDURE — 3331090001 HH PPS REVENUE CREDIT

## 2018-06-02 PROCEDURE — 3331090002 HH PPS REVENUE DEBIT

## 2018-06-03 PROCEDURE — 3331090001 HH PPS REVENUE CREDIT

## 2018-06-03 PROCEDURE — 3331090002 HH PPS REVENUE DEBIT

## 2018-06-04 PROCEDURE — 3331090001 HH PPS REVENUE CREDIT

## 2018-06-04 PROCEDURE — 3331090002 HH PPS REVENUE DEBIT

## 2018-06-05 ENCOUNTER — HOME CARE VISIT (OUTPATIENT)
Dept: SCHEDULING | Facility: HOME HEALTH | Age: 78
End: 2018-06-05
Payer: MEDICARE

## 2018-06-05 VITALS
OXYGEN SATURATION: 98 % | TEMPERATURE: 97.5 F | HEART RATE: 97 BPM | DIASTOLIC BLOOD PRESSURE: 70 MMHG | SYSTOLIC BLOOD PRESSURE: 128 MMHG | RESPIRATION RATE: 16 BRPM

## 2018-06-05 PROCEDURE — 3331090002 HH PPS REVENUE DEBIT

## 2018-06-05 PROCEDURE — G0157 HHC PT ASSISTANT EA 15: HCPCS

## 2018-06-05 PROCEDURE — 3331090001 HH PPS REVENUE CREDIT

## 2018-06-06 PROCEDURE — 3331090001 HH PPS REVENUE CREDIT

## 2018-06-06 PROCEDURE — 3331090002 HH PPS REVENUE DEBIT

## 2018-06-07 PROCEDURE — 3331090002 HH PPS REVENUE DEBIT

## 2018-06-07 PROCEDURE — 3331090001 HH PPS REVENUE CREDIT

## 2018-06-08 ENCOUNTER — HOME CARE VISIT (OUTPATIENT)
Dept: SCHEDULING | Facility: HOME HEALTH | Age: 78
End: 2018-06-08
Payer: MEDICARE

## 2018-06-08 VITALS
DIASTOLIC BLOOD PRESSURE: 80 MMHG | HEART RATE: 85 BPM | TEMPERATURE: 96.5 F | SYSTOLIC BLOOD PRESSURE: 139 MMHG | OXYGEN SATURATION: 98 %

## 2018-06-08 PROCEDURE — 3331090002 HH PPS REVENUE DEBIT

## 2018-06-08 PROCEDURE — 3331090003 HH PPS REVENUE ADJ

## 2018-06-08 PROCEDURE — 3331090001 HH PPS REVENUE CREDIT

## 2018-06-08 PROCEDURE — G0151 HHCP-SERV OF PT,EA 15 MIN: HCPCS

## 2018-06-09 PROCEDURE — 3331090002 HH PPS REVENUE DEBIT

## 2018-06-09 PROCEDURE — 3331090001 HH PPS REVENUE CREDIT

## 2018-06-10 PROCEDURE — 3331090001 HH PPS REVENUE CREDIT

## 2018-06-10 PROCEDURE — 3331090002 HH PPS REVENUE DEBIT

## 2018-06-11 PROCEDURE — 3331090001 HH PPS REVENUE CREDIT

## 2018-06-11 PROCEDURE — 3331090002 HH PPS REVENUE DEBIT

## 2018-06-12 PROCEDURE — 3331090002 HH PPS REVENUE DEBIT

## 2018-06-12 PROCEDURE — 3331090001 HH PPS REVENUE CREDIT

## 2018-07-31 ENCOUNTER — CLINICAL SUPPORT (OUTPATIENT)
Dept: CARDIOLOGY CLINIC | Age: 78
End: 2018-07-31

## 2018-07-31 ENCOUNTER — OFFICE VISIT (OUTPATIENT)
Dept: CARDIOLOGY CLINIC | Age: 78
End: 2018-07-31

## 2018-07-31 VITALS
RESPIRATION RATE: 18 BRPM | HEIGHT: 75 IN | WEIGHT: 207.1 LBS | DIASTOLIC BLOOD PRESSURE: 78 MMHG | BODY MASS INDEX: 25.75 KG/M2 | HEART RATE: 80 BPM | OXYGEN SATURATION: 97 % | SYSTOLIC BLOOD PRESSURE: 132 MMHG

## 2018-07-31 DIAGNOSIS — G45.8 OTHER SPECIFIED TRANSIENT CEREBRAL ISCHEMIAS: ICD-10-CM

## 2018-07-31 DIAGNOSIS — I48.0 PAROXYSMAL ATRIAL FIBRILLATION (HCC): Chronic | ICD-10-CM

## 2018-07-31 DIAGNOSIS — Z86.79 S/P ABLATION OF ATRIAL FIBRILLATION: ICD-10-CM

## 2018-07-31 DIAGNOSIS — I49.9 IRREGULAR HEARTBEAT: Primary | ICD-10-CM

## 2018-07-31 DIAGNOSIS — Z98.890 S/P ABLATION OF ATRIAL FIBRILLATION: ICD-10-CM

## 2018-07-31 DIAGNOSIS — I10 ESSENTIAL HYPERTENSION: Chronic | ICD-10-CM

## 2018-07-31 DIAGNOSIS — I48.0 PAROXYSMAL ATRIAL FIBRILLATION (HCC): Primary | Chronic | ICD-10-CM

## 2018-07-31 DIAGNOSIS — Z45.09 ENCOUNTER FOR LOOP RECORDER CHECK: ICD-10-CM

## 2018-07-31 NOTE — PROGRESS NOTES
See scanned implantable loop recorder report in chart. Will transmit manually each month  Non chargeable visit.

## 2018-07-31 NOTE — MR AVS SNAPSHOT
[facility] Damian [address] [facility] 
[phone] Patient: Steven Pelletier MRN: [id number] ASHLY:15/8/2097 Visit Information Date & Time Provider Department Dept. Phone Encounter #  
 7/31/2018  1:15 PM Augusto Sage, [address] [facility] [phone] [id number] Your Appointments 11/1/2018 10:45 AM  
PROCEDURE with PACEMAKER, [facility]) Appt Note: device check 3mo  ekr [address]  
[phone] [address] Upcoming Health Maintenance Date Due DTaP/Tdap/Td series (1 - Tdap) 10/5/1963 ZOSTER VACCINE AGE 60> 8/5/2002 GLAUCOMA SCREENING Q2Y 10/5/2007 Pneumococcal 65+ Low/Medium Risk (1 of 2 - PCV13) 10/5/2007 MEDICARE YEARLY EXAM 3/14/2018 Influenza Age 5 to Adult 8/1/2018 Allergies as of 7/31/2018  Review Complete On: 7/31/2018 By: Dhruv Overton NP Severity Noted Reaction Type Reactions Chocolate Flavor Medium 05/04/2018   Side Effect Other (comments)  
 migraines Current Immunizations  Never Reviewed No immunizations on file. Not reviewed this visit You Were Diagnosed With   
  
 Codes Comments Irregular heartbeat    -  Primary ICD-10-CM: I49.9 ICD-9-CM: 427.9 Paroxysmal atrial fibrillation (HCC)     ICD-10-CM: I48.0 ICD-9-CM: 427.31 Essential hypertension     ICD-10-CM: I10 
ICD-9-CM: 401.9 S/P ablation of atrial fibrillation     ICD-10-CM: Z98.890, Z86.79 
ICD-9-CM: V45.89 Other specified transient cerebral ischemias     ICD-10-CM: G45.8 ICD-9-CM: 435.8 Vitals BP Pulse Resp Height(growth percentile) Weight(growth percentile) SpO2  
 132/78 (BP 1 Location: Left arm, BP Patient Position: Sitting) 80 18 6' 3\" (1.905 m) 207 lb 1.6 oz (93.9 kg) 97% BMI Smoking Status 25.89 kg/m2 Never Smoker Vitals History BMI and BSA Data Body Mass Index Body Surface Area  
 25.89 kg/m 2 2.23 m 2 Preferred Pharmacy Pharmacy Name Phone [facility] [phone] Your Updated Medication List  
  
   
This list is accurate as of 7/31/18  2:20 PM.  Always use your most recent med list.  
  
  
  
  
 aspirin 81 mg chewable tablet Take 1 Tab by mouth daily. FLOMAX 0.4 mg capsule Generic drug:  tamsulosin Take 0.8 mg by mouth daily. latanoprost 0.005 % ophthalmic solution Commonly known as:  [place] Administer 1 Drop to both eyes nightly. levothyroxine 75 mcg tablet Commonly known as:  SYNTHROID Take 1 Tab by mouth Daily (before breakfast). metoprolol succinate 25 mg XL tablet Commonly known as:  TOPROL-XL Take 25 mg by mouth daily. PRESERVISION AREDS 2 PO Take  by mouth two (2) times a day. rivaroxaban 20 mg Tab tablet Commonly known as:  Brenda Rfancisco J Take 1 Tab by mouth daily (with dinner). Indications: PREVENT THROMBOEMBOLISM IN CHRONIC ATRIAL FIBRILLATION  
  
 timolol 0.5 % ophthalmic solution Commonly known as:  TIMOPTIC Administer 1 Drop to both eyes daily. We Performed the Following AMB POC EKG ROUTINE W/ 12 LEADS, INTER & REP [19847 CPT(R)] Introducing [facility]! [employer] introduces [employer] patient portal. Now you can access parts of your medical record, email your doctor's office, and request medication refills online. 1. In your internet browser, go to [email or web address] 2. Click on the First Time User? Click Here link in the Sign In box. You will see the New Member Sign Up page. 3. Enter your [employer] Access Code exactly as it appears below. You will not need to use this code after youve completed the sign-up process.  If you do not sign up before the expiration date, you must request a new code. · Mobile Shareholder Access Code: [email or web address] Expires: 10/29/2018  1:16 PM 
 
4. Enter the last four digits of your Social Security Number (xxxx) and Date of Birth (mm/dd/yyyy) as indicated and click Submit. You will be taken to the next sign-up page. 5. Create a Mobile Shareholder ID. This will be your Mobile Shareholder login ID and cannot be changed, so think of one that is secure and easy to remember. 6. Create a Mobile Shareholder password. You can change your password at any time. 7. Enter your Password Reset Question and Answer. This can be used at a later time if you forget your password. 8. Enter your e-mail address. You will receive e-mail notification when new information is available in [address] 9. Click Sign Up. You can now view and download portions of your medical record. 10. Click the Download Summary menu link to download a portable copy of your medical information. If you have questions, please visit the Frequently Asked Questions section of the Mobile Share[employer] website. Remember, Mobile Shareholder is NOT to be used for urgent needs. For medical emergencies, dial 911. Now available from your iPhone and Android! Please provide this summary of care documentation to your next provider. Your primary care clinician is listed as Bisi Quintanillar. If you have any questions after today's visit, please call [phone].

## 2018-07-31 NOTE — PROGRESS NOTES
Subjective:      Marcelino Carrillo is a 78 y.o. male is here for follow up s/p AF ablation and ILR in 4/2018. He is doing well and denies cardiac complaints. The patient denies chest pain/ shortness of breath, orthopnea, PND, LE edema, palpitations, syncope, presyncope or fatigue. Patient Active Problem List    Diagnosis Date Noted    Irregular heartbeat 07/31/2018    S/P ablation of atrial fibrillation 04/22/2018    Cardiac tamponade 04/22/2018    A-fib ([address] 04/09/2018    Pericardial tamponade 04/09/2018    TIA (transient ischemic attack) 02/01/2018    Broca's aphasia 01/31/2018    Paroxysmal atrial fibrillation ([address] 01/31/2018    Chronic anticoagulation 58/47/0156    Systolic CHF, chronic ([facility] 01/31/2018    Acquired hypothyroidism 01/31/2018    Essential hypertension 01/31/2018      Jimi Schmidt MD  Past Medical History:   Diagnosis Date    Atrial fibrillation [facility])     CAD (coronary artery disease)     Headache     Hypertension     Hypothyroidism     Stroke [facility])       Past Surgical History:   Procedure Laterality Date    CARDIAC SURG PROCEDURE UNLIST  04/09/2018    cardiac ablation, implant permanent cardiac monitor-[employer]tr[employer]    CATH PERICARDIOCENTESIS  4/22/2018         HX APPENDECTOMY  1947    HX CATARACT REMOVAL  2009 x 2    HX THYROIDECTOMY  1950 Partial,    2003 Full     Allergies   Allergen Reactions    Chocolate Flavor Other (comments)     migraines      No family history on file.  negative for cardiac disease  Social History     Social History    Marital status:      Spouse name: N/A    Number of children: N/A    Years of education: N/A     Social History Main Topics    Smoking status: Never Smoker    Smokeless tobacco: Never Used    Alcohol use No    Drug use: No    Sexual activity: No     Other Topics Concern    None     Social History Narrative     Current Outpatient Prescriptions   Medication Sig    VIT C/E/ZN/COPPR/LUTEIN/ZEAXAN (PRESERVISION AREDS 2 PO) Take  by mouth two (2) times a day.  latanoprost (XALATAN) 0.005 % ophthalmic solution Administer 1 Drop to both eyes nightly.  aspirin 81 mg chewable tablet Take 1 Tab by mouth daily.  levothyroxine (SYNTHROID) 75 mcg tablet Take 1 Tab by mouth Daily (before breakfast).  rivaroxaban (XARELTO) 20 mg tab tablet Take 1 Tab by mouth daily (with dinner). Indications: PREVENT THROMBOEMBOLISM IN CHRONIC ATRIAL FIBRILLATION    metoprolol succinate (TOPROL-XL) 25 mg XL tablet Take 25 mg by mouth daily.  tamsulosin (FLOMAX) 0.4 mg capsule Take 0.8 mg by mouth daily.  timolol (TIMOPTIC) 0.5 % ophthalmic solution Administer 1 Drop to both eyes daily. No current facility-administered medications for this visit. Vitals:    07/31/18 1337   BP: 132/78   Pulse: 80   Resp: 18   SpO2: 97%   Weight: 207 lb 1.6 oz (93.9 kg)   Height: 6' 3\" (1.905 m)       I have reviewed the nurses notes, vitals, problem list, allergy list, medical history, family, social history and medications. Review of Symptoms:    General: Pt denies excessive weight gain or loss. Pt is able to conduct ADL's  HEENT: Denies blurred vision, headaches, epistaxis and difficulty swallowing. Respiratory: Denies shortness of breath, DOWNS, wheezing or stridor. Cardiovascular: Denies precordial pain, palpitations, edema or PND  Gastrointestinal: Denies poor appetite, indigestion, abdominal pain or blood in stool  Urinary: Denies dysuria, pyuria  Musculoskeletal: Denies pain or swelling from muscles or joints  Neurologic: Denies tremor, paresthesias, or sensory motor disturbance  Skin: Denies rash, itching or texture change. Psych: Denies depression      Physical Exam:      General: Well developed, in no acute distress. HEENT: Eyes - PERRL, no jvd  Heart:  Normal S1/S2 negative S3 or S4.  Regular, no murmur, gallop or rub.   Respiratory: Clear bilaterally x 4, no wheezing or rales  Abdomen:   Soft, non-tender, bowel sounds are active.   Extremities:  No edema, normal cap refill, no cyanosis. Musculoskeletal: No clubbing  Neuro: A&Ox3, speech clear, gait stable. Skin: Skin color is normal. No rashes or lesions. Non diaphoretic  Vascular: 2+ pulses symmetric in all extremities    Cardiographics    Ekg: EKG shows sinus rhythm     Results for orders placed or performed during the hospital encounter of 04/21/18   EKG, 12 LEAD, INITIAL   Result Value Ref Range    Ventricular Rate 87 BPM    Atrial Rate 87 BPM    P-R Interval 172 ms    QRS Duration 100 ms    Q-T Interval 386 ms    QTC Calculation (Bezet) 464 ms    Calculated P Axis 47 degrees    Calculated R Axis 29 degrees    Calculated T Axis 51 degrees    Diagnosis       Normal sinus rhythm  Cannot rule out Anterior infarct (cited on or before 21-APR-2018)  Confirmed by Julieta Landa ([id number]) on 4/25/2018 6:53:03 AM           Lab Results   Component Value Date/Time    WBC 7.9 04/28/2018 01:58 AM    HGB 11.6 (L) 04/28/2018 01:58 AM    HCT 34.6 (L) 04/28/2018 01:58 AM    PLATELET 996 70/06/3433 01:58 AM    MCV 84.4 04/28/2018 01:58 AM      Lab Results   Component Value Date/Time    Sodium 136 04/28/2018 01:58 AM    Potassium 4.0 04/28/2018 01:58 AM    Chloride 103 04/28/2018 01:58 AM    CO2 28 04/28/2018 01:58 AM    Anion gap 5 04/28/2018 01:58 AM    Glucose 119 (H) 04/28/2018 01:58 AM    BUN 17 04/28/2018 01:58 AM    Creatinine 0.84 04/28/2018 01:58 AM    BUN/Creatinine ratio 20 04/28/2018 01:58 AM    GFR est AA >60 04/28/2018 01:58 AM    GFR est non-AA >60 04/28/2018 01:58 AM    Calcium 8.1 (L) 04/28/2018 01:58 AM    Bilirubin, total 0.5 04/28/2018 01:58 AM    AST (SGOT) 32 04/28/2018 01:58 AM    Alk.  phosphatase 92 04/28/2018 01:58 AM    Protein, total 5.7 (L) 04/28/2018 01:58 AM    Albumin 2.7 (L) 04/28/2018 01:58 AM    Globulin 3.0 04/28/2018 01:58 AM    A-G Ratio 0.9 (L) 04/28/2018 01:58 AM    ALT (SGPT) 106 (H) 04/28/2018 01:58 AM         Assessment:     Assessment:        ICD-10-CM ICD-9-CM    1. Irregular heartbeat I49.9 427.9 AMB POC EKG ROUTINE W/ 12 LEADS, INTER & REP   2. Paroxysmal atrial fibrillation (HCC) I48.0 427.31    3. Essential hypertension I10 401.9    4. S/P ablation of atrial fibrillation Z98.890 V45.89     Z86.79     5. Other specified transient cerebral ischemias G45.8 435.8      Orders Placed This Encounter    AMB POC EKG ROUTINE W/ 12 LEADS, INTER & REP     Order Specific Question:   Reason for Exam:     Answer:   routine        Plan:   Mr. Sandi Brito is here for follow up s/p AF ablation and ILR implant in 4/2018. He is doing well and denies cardiac complaints. EKG shows sinus rhythm. ILR is negative. He can continue current medical therapy and follow up with Dr. Asher Winkler in 6 months. Continue medical management for HTN. Thank you for allowing me to participate in Melyssa Blackwell 's care. Patient seen and examined by me with nurse practitioner. I personally performed all components of the history, physical, and medical decision making and agree with the assessment and plan with minor modifications as noted. He is in sinus and asymptomatic. He is on oac for his elevated chadsvasc score. F/u in 6 months. Cont  Med rx for htn.     Myra Ivey MD, Paco Grant

## 2018-07-31 NOTE — PROGRESS NOTES
1. Have you been to the ER, urgent care clinic since your last visit? Hospitalized since your last visit? No    2. Have you seen or consulted any other health care providers outside of the [address] [place] [address] since your last visit? Include any pap smears or colon screening. No    Chief Complaint   Patient presents with    Irregular Heart Beat     2 mo appt. C/O SOB with exertion.

## 2018-10-30 ENCOUNTER — CLINICAL SUPPORT (OUTPATIENT)
Dept: CARDIOLOGY CLINIC | Age: 78
End: 2018-10-30

## 2018-10-30 DIAGNOSIS — Z95.818 STATUS POST PLACEMENT OF IMPLANTABLE LOOP RECORDER: ICD-10-CM

## 2018-10-30 DIAGNOSIS — I48.0 PAROXYSMAL ATRIAL FIBRILLATION (HCC): Primary | ICD-10-CM

## 2018-10-30 DIAGNOSIS — I49.9 IRREGULAR HEARTBEAT: ICD-10-CM

## 2019-01-31 ENCOUNTER — CLINICAL SUPPORT (OUTPATIENT)
Dept: CARDIOLOGY CLINIC | Age: 79
End: 2019-01-31

## 2019-01-31 ENCOUNTER — OFFICE VISIT (OUTPATIENT)
Dept: CARDIOLOGY CLINIC | Age: 79
End: 2019-01-31

## 2019-01-31 VITALS
WEIGHT: 216 LBS | BODY MASS INDEX: 26.86 KG/M2 | OXYGEN SATURATION: 99 % | HEIGHT: 75 IN | RESPIRATION RATE: 18 BRPM | SYSTOLIC BLOOD PRESSURE: 138 MMHG | DIASTOLIC BLOOD PRESSURE: 100 MMHG | HEART RATE: 73 BPM

## 2019-01-31 DIAGNOSIS — Z98.890 S/P ABLATION OF ATRIAL FIBRILLATION: ICD-10-CM

## 2019-01-31 DIAGNOSIS — Z95.818 STATUS POST PLACEMENT OF IMPLANTABLE LOOP RECORDER: ICD-10-CM

## 2019-01-31 DIAGNOSIS — Z86.79 S/P ABLATION OF ATRIAL FIBRILLATION: ICD-10-CM

## 2019-01-31 DIAGNOSIS — I48.0 PAROXYSMAL ATRIAL FIBRILLATION (HCC): Primary | Chronic | ICD-10-CM

## 2019-01-31 DIAGNOSIS — I50.22 SYSTOLIC CHF, CHRONIC (HCC): ICD-10-CM

## 2019-01-31 DIAGNOSIS — Z45.09 ENCOUNTER FOR LOOP RECORDER CHECK: ICD-10-CM

## 2019-01-31 DIAGNOSIS — G45.8 OTHER SPECIFIED TRANSIENT CEREBRAL ISCHEMIAS: ICD-10-CM

## 2019-01-31 DIAGNOSIS — I10 ESSENTIAL HYPERTENSION: ICD-10-CM

## 2019-01-31 DIAGNOSIS — I48.0 PAROXYSMAL ATRIAL FIBRILLATION (HCC): Primary | ICD-10-CM

## 2019-01-31 NOTE — PROGRESS NOTES
Subjective:      Shena Torres is a 70 y.o. male is here for EP follow up. The patient denies chest pain/ shortness of breath, orthopnea, PND, LE edema, palpitations, syncope, presyncope or fatigue. Feels well overall. Is not aware of any elevated HR. Patient Active Problem List    Diagnosis Date Noted    Irregular heartbeat 07/31/2018    S/P ablation of atrial fibrillation 04/22/2018    Cardiac tamponade 04/22/2018    A-fib ([address] 04/09/2018    Pericardial tamponade 04/09/2018    TIA (transient ischemic attack) 02/01/2018    Broca's aphasia 01/31/2018    Paroxysmal atrial fibrillation ([address] 01/31/2018    Chronic anticoagulation 33/15/3041    Systolic CHF, chronic ([facility] 01/31/2018    Acquired hypothyroidism 01/31/2018    Essential hypertension 01/31/2018      Sparkle Robles MD  Past Medical History:   Diagnosis Date    Atrial fibrillation [facility])     CAD (coronary artery disease)     Headache     Hypertension     Hypothyroidism     Stroke [facility])       Past Surgical History:   Procedure Laterality Date    CARDIAC SURG PROCEDURE UNLIST  04/09/2018    cardiac ablation, implant permanent cardiac monitor-[employer]tr[employer]    CATH PERICARDIOCENTESIS  4/22/2018         HX APPENDECTOMY  1947    HX CATARACT REMOVAL  2009 x 2    HX THYROIDECTOMY  1950 Partial,    2003 Full     Allergies   Allergen Reactions    Chocolate Flavor Other (comments)     migraines      No family history on file.  negative for cardiac disease  Social History     Socioeconomic History    Marital status:      Spouse name: Not on file    Number of children: Not on file    Years of education: Not on file    Highest education level: Not on file   Tobacco Use    Smoking status: Never Smoker    Smokeless tobacco: Never Used   Substance and Sexual Activity    Alcohol use: No    Drug use: No    Sexual activity: No     Current Outpatient Medications   Medication Sig    VIT C/E/ZN/COPPR/LUTEIN/ZEAXAN (PRESERVISION AREDS 2 PO) Take  by mouth two (2) times a day.  latanoprost (XALATAN) 0.005 % ophthalmic solution Administer 1 Drop to both eyes nightly.  aspirin 81 mg chewable tablet Take 1 Tab by mouth daily.  levothyroxine (SYNTHROID) 75 mcg tablet Take 1 Tab by mouth Daily (before breakfast).  rivaroxaban (XARELTO) 20 mg tab tablet Take 1 Tab by mouth daily (with dinner). Indications: PREVENT THROMBOEMBOLISM IN CHRONIC ATRIAL FIBRILLATION    metoprolol succinate (TOPROL-XL) 25 mg XL tablet Take 25 mg by mouth daily.  tamsulosin (FLOMAX) 0.4 mg capsule Take 0.8 mg by mouth daily.  timolol (TIMOPTIC) 0.5 % ophthalmic solution Administer 1 Drop to both eyes daily. No current facility-administered medications for this visit. Vitals:    01/31/19 1358 01/31/19 1416   BP: (!) 160/100 (!) 138/100   Pulse: 73    Resp: 18    SpO2: 99%    Weight: 216 lb (98 kg)    Height: 6' 3\" (1.905 m)        I have reviewed the nurses notes, vitals, problem list, allergy list, medical history, family, social history and medications. Review of Symptoms:    General: Pt denies excessive weight gain or loss. Pt is able to conduct ADL's  HEENT: Denies blurred vision, headaches, epistaxis and difficulty swallowing. Respiratory: Denies shortness of breath, DOWNS, wheezing or stridor. Cardiovascular: Denies precordial pain, palpitations, edema or PND  Gastrointestinal: Denies poor appetite, indigestion, abdominal pain or blood in stool  Urinary: Denies dysuria, pyuria  Musculoskeletal: Denies pain or swelling from muscles or joints  Neurologic: Denies tremor, paresthesias, or sensory motor disturbance  Skin: Denies rash, itching or texture change. Psych: Denies depression      Physical Exam:      General: Well developed, in no acute distress. HEENT: Eyes - PERRL, no jvd  Heart:  Normal S1/S2 negative S3 or S4.  Regular, no murmur, gallop or rub.   Respiratory: Clear bilaterally x 4, no wheezing or rales  Extremities:  No edema, normal cap refill, no cyanosis. Musculoskeletal: No clubbing  Neuro: A&Ox3, speech clear, gait stable. Skin: Skin color is normal. No rashes or lesions. Non diaphoretic  Vascular: 2+ pulses symmetric in all extremities    Cardiographics    Ekg: sinus rhythm, ventricular rate 73    Results for orders placed or performed during the hospital encounter of 04/21/18   EKG, 12 LEAD, INITIAL   Result Value Ref Range    Ventricular Rate 87 BPM    Atrial Rate 87 BPM    P-R Interval 172 ms    QRS Duration 100 ms    Q-T Interval 386 ms    QTC Calculation (Bezet) 464 ms    Calculated P Axis 47 degrees    Calculated R Axis 29 degrees    Calculated T Axis 51 degrees    Diagnosis       Normal sinus rhythm  Cannot rule out Anterior infarct (cited on or before 21-APR-2018)  Confirmed by Ayush Whipple ([id number]) on 4/25/2018 6:53:03 AM           Lab Results   Component Value Date/Time    WBC 7.9 04/28/2018 01:58 AM    HGB 11.6 (L) 04/28/2018 01:58 AM    HCT 34.6 (L) 04/28/2018 01:58 AM    PLATELET 988 76/84/5216 01:58 AM    MCV 84.4 04/28/2018 01:58 AM      Lab Results   Component Value Date/Time    Sodium 136 04/28/2018 01:58 AM    Potassium 4.0 04/28/2018 01:58 AM    Chloride 103 04/28/2018 01:58 AM    CO2 28 04/28/2018 01:58 AM    Anion gap 5 04/28/2018 01:58 AM    Glucose 119 (H) 04/28/2018 01:58 AM    BUN 17 04/28/2018 01:58 AM    Creatinine 0.84 04/28/2018 01:58 AM    BUN/Creatinine ratio 20 04/28/2018 01:58 AM    GFR est AA >60 04/28/2018 01:58 AM    GFR est non-AA >60 04/28/2018 01:58 AM    Calcium 8.1 (L) 04/28/2018 01:58 AM    Bilirubin, total 0.5 04/28/2018 01:58 AM    AST (SGOT) 32 04/28/2018 01:58 AM    Alk.  phosphatase 92 04/28/2018 01:58 AM    Protein, total 5.7 (L) 04/28/2018 01:58 AM    Albumin 2.7 (L) 04/28/2018 01:58 AM    Globulin 3.0 04/28/2018 01:58 AM    A-G Ratio 0.9 (L) 04/28/2018 01:58 AM    ALT (SGPT) 106 (H) 04/28/2018 01:58 AM      Lab Results   Component Value Date/Time    TSH 1.07 04/21/2018 08:15 PM        Assessment:           ICD-10-CM ICD-9-CM    1. Paroxysmal atrial fibrillation (HCC) I48.0 427.31 AMB POC EKG ROUTINE W/ 12 LEADS, INTER & REP   2. S/P ablation of atrial fibrillation Z98.890 V45.89     Z86.79     3. Essential hypertension I10 401.9    4. Encounter for loop recorder check Z45.09 V53.39    5. Other specified transient cerebral ischemias G45.8 435.8    6. Systolic CHF, chronic (HCC) I50.22 428.22      428.0      Orders Placed This Encounter    AMB POC EKG ROUTINE W/ 12 LEADS, INTER & REP     Order Specific Question:   Reason for Exam:     Answer:   routine        Plan:     Mr Carl Pavon presents for follow up s/p AF ablation and ILR implant in 4/2018. He is doing well and denies cardiac complaints. EKG shows sinus rhythm. ILR with one AF episodes lasting 32 min. He is asymptomatic. Will review ILR report with Dr Eliecer Stevens.   He can continue current medical therapy and follow up with Dr. Eliecer Stevens in 6 months.      Continue medical management for HTN. Thank you for allowing me to participate in [facility] 's care.       Pernell Liu NP

## 2019-01-31 NOTE — PROGRESS NOTES
Chief Complaint   Patient presents with    Irregular Heart Beat     denies chest pain or SOB     1. Have you been to the ER, urgent care clinic since your last visit? Hospitalized since your last visit? No    2. Have you seen or consulted any other health care providers outside of the [address] [place] [address] since your last visit? Include any pap smears or colon screening.  No

## 2019-05-21 ENCOUNTER — CLINICAL SUPPORT (OUTPATIENT)
Dept: CARDIOLOGY CLINIC | Age: 79
End: 2019-05-21

## 2019-05-21 DIAGNOSIS — Z45.09 ENCOUNTER FOR LOOP RECORDER CHECK: ICD-10-CM

## 2019-05-21 DIAGNOSIS — I48.0 PAROXYSMAL ATRIAL FIBRILLATION (HCC): Primary | ICD-10-CM

## 2019-09-05 ENCOUNTER — CLINICAL SUPPORT (OUTPATIENT)
Dept: CARDIOLOGY CLINIC | Age: 79
End: 2019-09-05

## 2019-09-05 DIAGNOSIS — I48.0 PAROXYSMAL ATRIAL FIBRILLATION (HCC): Primary | ICD-10-CM

## 2019-09-05 DIAGNOSIS — Z45.09 ENCOUNTER FOR LOOP RECORDER CHECK: ICD-10-CM

## 2020-01-22 ENCOUNTER — OFFICE VISIT (OUTPATIENT)
Dept: CARDIOLOGY CLINIC | Age: 80
End: 2020-01-22

## 2020-01-22 ENCOUNTER — CLINICAL SUPPORT (OUTPATIENT)
Dept: CARDIOLOGY CLINIC | Age: 80
End: 2020-01-22

## 2020-01-22 VITALS
DIASTOLIC BLOOD PRESSURE: 90 MMHG | BODY MASS INDEX: 26.24 KG/M2 | HEIGHT: 75 IN | RESPIRATION RATE: 18 BRPM | WEIGHT: 211 LBS | OXYGEN SATURATION: 98 % | SYSTOLIC BLOOD PRESSURE: 136 MMHG | HEART RATE: 72 BPM

## 2020-01-22 DIAGNOSIS — I48.0 PAROXYSMAL ATRIAL FIBRILLATION (HCC): Primary | ICD-10-CM

## 2020-01-22 DIAGNOSIS — Z45.09 ENCOUNTER FOR LOOP RECORDER CHECK: ICD-10-CM

## 2020-01-22 DIAGNOSIS — I10 ESSENTIAL HYPERTENSION: Chronic | ICD-10-CM

## 2020-01-22 DIAGNOSIS — I48.0 PAROXYSMAL ATRIAL FIBRILLATION (HCC): Primary | Chronic | ICD-10-CM

## 2020-01-22 DIAGNOSIS — I50.22 SYSTOLIC CHF, CHRONIC (HCC): Chronic | ICD-10-CM

## 2020-01-22 NOTE — PROGRESS NOTES
1. Have you been to the ER, urgent care clinic since your last visit? Hospitalized since your last visit? No.    2. Have you seen or consulted any other health care providers outside of the [address] [place] [address] since your last visit? Include any pap smears or colon screening.    No.      Chief Complaint   Patient presents with    Annual Exam     and 3 month device- pt denies any cardiac symptoms- BP been elevated lately

## 2020-01-22 NOTE — PROGRESS NOTES
Subjective:      Rocio De La Cruz is a 70 y.o. male is here for EP follow up. The patient denies chest pain/ shortness of breath, orthopnea, PND, LE edema, palpitations, syncope, presyncope or fatigue. Is feeling well overall. Remains on  and [facility]. Patient Active Problem List    Diagnosis Date Noted    Irregular heartbeat 07/31/2018    S/P ablation of atrial fibrillation 04/22/2018    Cardiac tamponade 04/22/2018    A-fib ([address] 04/09/2018    Pericardial tamponade 04/09/2018    TIA (transient ischemic attack) 02/01/2018    Broca's aphasia 01/31/2018    Paroxysmal atrial fibrillation ([address] 01/31/2018    Chronic anticoagulation 18/74/2204    Systolic [facility]F, chronic ([facility] 01/31/2018    Acquired hypothyroidism 01/31/2018    Essential hypertension 01/31/2018      Sonali Santoyo MD  Past Medical History:   Diagnosis Date    Atrial fibrillation [facility])     CAD (coronary artery disease)     Headache     Hypertension     Hypothyroidism     Stroke [facility])       Past Surgical History:   Procedure Laterality Date    CARDIAC SURG PROCEDURE UNLIST  04/09/2018    cardiac ablation, implant permanent cardiac monitor-[employer]tr[employer]    CATH PERICARDIOCENTESIS  4/22/2018         HX APPENDECTOMY  1947    HX CATARACT REMOVAL  2009 x 2    HX THYROIDECTOMY  1950 Partial,    2003 Full     Allergies   Allergen Reactions    Chocolate Flavor Other (comments)     migraines      No family history on file.  negative for cardiac disease  Social History     Socioeconomic History    Marital status:      Spouse name: Not on file    Number of children: Not on file    Years of education: Not on file    Highest education level: Not on file   Tobacco Use    Smoking status: Never Smoker    Smokeless tobacco: Never Used   Substance and Sexual Activity    Alcohol use: No    Drug use: No    Sexual activity: Never     Current Outpatient Medications   Medication Sig    VIT C/E/ZN/COPPR/LUTEIN/ZEAXAN (PRESERVISION AREDS 2 PO) Take  by mouth two (2) times a day.  latanoprost (XALATAN) 0.005 % ophthalmic solution Administer 1 Drop to both eyes nightly.  aspirin 81 mg chewable tablet Take 1 Tab by mouth daily.  levothyroxine (SYNTHROID) 75 mcg tablet Take 1 Tab by mouth Daily (before breakfast).  rivaroxaban (XARELTO) 20 mg tab tablet Take 1 Tab by mouth daily (with dinner). Indications: PREVENT THROMBOEMBOLISM IN CHRONIC ATRIAL FIBRILLATION    metoprolol succinate (TOPROL-XL) 25 mg XL tablet Take 25 mg by mouth daily.  timolol (TIMOPTIC) 0.5 % ophthalmic solution Administer 1 Drop to both eyes daily.  tamsulosin (FLOMAX) 0.4 mg capsule Take 0.8 mg by mouth daily. No current facility-administered medications for this visit. Vitals:    01/22/20 1118   BP: 136/90   Pulse: 72   Resp: 18   SpO2: 98%   Weight: 211 lb (95.7 kg)   Height: 6' 3\" (1.905 m)       I have reviewed the nurses notes, vitals, problem list, allergy list, medical history, family, social history and medications. Review of Symptoms:    General: Pt denies excessive weight gain or loss. Pt is able to conduct ADL's  HEENT: Denies blurred vision, headaches, epistaxis and difficulty swallowing. Respiratory: Denies shortness of breath, DOWNS, wheezing or stridor. Cardiovascular: Denies precordial pain, palpitations, edema or PND  Gastrointestinal: Denies poor appetite, indigestion, abdominal pain or blood in stool  Urinary: Denies dysuria, pyuria  Musculoskeletal: Denies pain or swelling from muscles or joints  Neurologic: Denies tremor, paresthesias, or sensory motor disturbance  Skin: Denies rash, itching or texture change. Psych: Denies depression      Physical Exam:      General: Well developed, in no acute distress. HEENT: Eyes - PERRL, no jvd  Heart:  Normal S1/S2 negative S3 or S4. Regular, no murmur, gallop or rub.    Respiratory: Clear bilaterally x 4, no wheezing or rales  Extremities:  No edema, normal cap refill, no cyanosis. Musculoskeletal: No clubbing  Neuro: A&Ox3, speech clear, gait stable. Skin: Skin color is normal. No rashes or lesions. Non diaphoretic  Vascular: 2+ pulses symmetric in all extremities    Cardiographics    Ekg:Sinus  Rhythm   WITHIN NORMAL LIMITS    Results for orders placed or performed during the hospital encounter of 04/21/18   EKG, 12 LEAD, INITIAL   Result Value Ref Range    Ventricular Rate 87 BPM    Atrial Rate 87 BPM    P-R Interval 172 ms    QRS Duration 100 ms    Q-T Interval 386 ms    QTC Calculation (Bezet) 464 ms    Calculated P Axis 47 degrees    Calculated R Axis 29 degrees    Calculated T Axis 51 degrees    Diagnosis       Normal sinus rhythm  Cannot rule out Anterior infarct (cited on or before 21-APR-2018)  Confirmed by Reno Del Angel ([id number]) on 4/25/2018 6:53:03 AM           Lab Results   Component Value Date/Time    WBC 7.9 04/28/2018 01:58 AM    HGB 11.6 (L) 04/28/2018 01:58 AM    HCT 34.6 (L) 04/28/2018 01:58 AM    PLATELET 071 47/30/7450 01:58 AM    MCV 84.4 04/28/2018 01:58 AM      Lab Results   Component Value Date/Time    Sodium 136 04/28/2018 01:58 AM    Potassium 4.0 04/28/2018 01:58 AM    Chloride 103 04/28/2018 01:58 AM    CO2 28 04/28/2018 01:58 AM    Anion gap 5 04/28/2018 01:58 AM    Glucose 119 (H) 04/28/2018 01:58 AM    BUN 17 04/28/2018 01:58 AM    Creatinine 0.84 04/28/2018 01:58 AM    BUN/Creatinine ratio 20 04/28/2018 01:58 AM    GFR est AA >60 04/28/2018 01:58 AM    GFR est non-AA >60 04/28/2018 01:58 AM    Calcium 8.1 (L) 04/28/2018 01:58 AM    Bilirubin, total 0.5 04/28/2018 01:58 AM    AST (SGOT) 32 04/28/2018 01:58 AM    Alk.  phosphatase 92 04/28/2018 01:58 AM    Protein, total 5.7 (L) 04/28/2018 01:58 AM    Albumin 2.7 (L) 04/28/2018 01:58 AM    Globulin 3.0 04/28/2018 01:58 AM    A-G Ratio 0.9 (L) 04/28/2018 01:58 AM    ALT (SGPT) 106 (H) 04/28/2018 01:58 AM      Lab Results   Component Value Date/Time    TSH 1.07 04/21/2018 08:15 PM Assessment:           ICD-10-CM ICD-9-CM    1. Paroxysmal atrial fibrillation (HCC) I48.0 427.31 AMB POC EKG ROUTINE W/ 12 LEADS, INTER & REP   2. Systolic CHF, chronic (HCC) I50.22 428.22 AMB POC EKG ROUTINE W/ 12 LEADS, INTER & REP     428.0    3. Essential hypertension I10 401.9 AMB POC EKG ROUTINE W/ 12 LEADS, INTER & REP     Orders Placed This Encounter    AMB POC EKG ROUTINE W/ 12 LEADS, INTER & REP     Order Specific Question:   Reason for Exam:     Answer:   routine        Plan:     Mr Stewart Spence is here for annual follow up. Meeta Gonzalez is S/p AF ablation and ILR implant in 4/2018. He is doing well and denies cardiac complaints. EKG shows sinus rhythm. His bps have been up and down. I have asked him to monitor bps at home. Advised him to continue with bb and [address] [place] [address] and he will be followed up in our office in 3 mo and I will see him in 1 year. Continue medical management for CHF, HTN and PAF. Thank you for allowing me to participate in Larisa Castano 's care.       Alvino Payan NP

## 2020-06-16 ENCOUNTER — CLINICAL SUPPORT (OUTPATIENT)
Dept: CARDIOLOGY CLINIC | Age: 80
End: 2020-06-16

## 2020-06-16 DIAGNOSIS — I48.0 PAROXYSMAL ATRIAL FIBRILLATION (HCC): Primary | ICD-10-CM

## 2020-06-16 DIAGNOSIS — Z45.09 ENCOUNTER FOR LOOP RECORDER CHECK: ICD-10-CM

## 2020-07-14 ENCOUNTER — TELEPHONE (OUTPATIENT)
Dept: CARDIOLOGY CLINIC | Age: 80
End: 2020-07-14

## 2020-07-14 NOTE — TELEPHONE ENCOUNTER
Please call Dr Shaka Ley regarding patient    Wants to know if patient can or when can he stop taking Eliquis been on it for 2 years.     Cell #[phone]    Thanks  Jeronimo Cox

## 2020-07-22 DIAGNOSIS — I48.0 PAROXYSMAL ATRIAL FIBRILLATION (HCC): ICD-10-CM

## 2020-07-22 DIAGNOSIS — Z45.09 ENCOUNTER FOR LOOP RECORDER CHECK: Primary | ICD-10-CM

## 2020-07-22 DIAGNOSIS — Z86.79 S/P ABLATION OF ATRIAL FIBRILLATION: ICD-10-CM

## 2020-07-22 DIAGNOSIS — Z98.890 S/P ABLATION OF ATRIAL FIBRILLATION: ICD-10-CM

## 2020-07-22 DIAGNOSIS — I49.3 PVC'S (PREMATURE VENTRICULAR CONTRACTIONS): ICD-10-CM

## 2020-07-22 DIAGNOSIS — I10 ESSENTIAL HYPERTENSION: ICD-10-CM

## 2020-08-05 DIAGNOSIS — Z45.09 ENCOUNTER FOR LOOP RECORDER CHECK: Primary | ICD-10-CM

## 2020-08-05 DIAGNOSIS — I10 ESSENTIAL HYPERTENSION: ICD-10-CM

## 2020-08-05 DIAGNOSIS — Z98.890 S/P ABLATION OF ATRIAL FIBRILLATION: ICD-10-CM

## 2020-08-05 DIAGNOSIS — I48.0 PAROXYSMAL ATRIAL FIBRILLATION (HCC): ICD-10-CM

## 2020-08-05 DIAGNOSIS — Z86.79 S/P ABLATION OF ATRIAL FIBRILLATION: ICD-10-CM

## 2020-08-05 DIAGNOSIS — Z45.09 ENCOUNTER FOR LOOP RECORDER CHECK: ICD-10-CM

## 2020-08-05 DIAGNOSIS — Z95.818 STATUS POST PLACEMENT OF IMPLANTABLE LOOP RECORDER: ICD-10-CM

## 2020-08-06 ENCOUNTER — OFFICE VISIT (OUTPATIENT)
Dept: CARDIOLOGY CLINIC | Age: 80
End: 2020-08-06
Payer: MEDICARE

## 2020-08-06 ENCOUNTER — ANCILLARY PROCEDURE (OUTPATIENT)
Dept: CARDIOLOGY CLINIC | Age: 80
End: 2020-08-06
Payer: MEDICARE

## 2020-08-06 ENCOUNTER — CLINICAL SUPPORT (OUTPATIENT)
Dept: CARDIOLOGY CLINIC | Age: 80
End: 2020-08-06
Payer: MEDICARE

## 2020-08-06 VITALS
DIASTOLIC BLOOD PRESSURE: 82 MMHG | SYSTOLIC BLOOD PRESSURE: 138 MMHG | RESPIRATION RATE: 18 BRPM | BODY MASS INDEX: 24.98 KG/M2 | WEIGHT: 200.9 LBS | OXYGEN SATURATION: 98 % | HEART RATE: 66 BPM | HEIGHT: 75 IN

## 2020-08-06 VITALS
HEIGHT: 75 IN | DIASTOLIC BLOOD PRESSURE: 83 MMHG | WEIGHT: 210 LBS | BODY MASS INDEX: 26.11 KG/M2 | SYSTOLIC BLOOD PRESSURE: 162 MMHG

## 2020-08-06 DIAGNOSIS — I10 ESSENTIAL HYPERTENSION: ICD-10-CM

## 2020-08-06 DIAGNOSIS — I48.0 PAROXYSMAL ATRIAL FIBRILLATION (HCC): ICD-10-CM

## 2020-08-06 DIAGNOSIS — I50.22 SYSTOLIC CHF, CHRONIC (HCC): ICD-10-CM

## 2020-08-06 DIAGNOSIS — Z86.79 S/P ABLATION OF ATRIAL FIBRILLATION: ICD-10-CM

## 2020-08-06 DIAGNOSIS — Z98.890 S/P ABLATION OF ATRIAL FIBRILLATION: ICD-10-CM

## 2020-08-06 DIAGNOSIS — I48.0 PAROXYSMAL ATRIAL FIBRILLATION (HCC): Primary | Chronic | ICD-10-CM

## 2020-08-06 DIAGNOSIS — Z45.09 ENCOUNTER FOR LOOP RECORDER CHECK: Primary | ICD-10-CM

## 2020-08-06 DIAGNOSIS — G45.9 TIA (TRANSIENT ISCHEMIC ATTACK): ICD-10-CM

## 2020-08-06 DIAGNOSIS — I49.3 PVC (PREMATURE VENTRICULAR CONTRACTION): ICD-10-CM

## 2020-08-06 LAB
ECHO AO ASC DIAM: 3.54 CM
ECHO AO ROOT DIAM: 3.67 CM
ECHO AV AREA PEAK VELOCITY: 2.53 CM2
ECHO AV AREA PEAK VELOCITY: 2.54 CM2
ECHO AV PEAK GRADIENT: 4.56 MMHG
ECHO AV PEAK VELOCITY: 105.3 CM/S
ECHO EST RA PRESSURE: 10 MMHG
ECHO LA VOL 2C: 48.1 ML (ref 18–58)
ECHO LA VOL 4C: 41.46 ML (ref 18–58)
ECHO LA VOLUME INDEX A2C: 21.47 ML/M2 (ref 16–28)
ECHO LA VOLUME INDEX A4C: 18.51 ML/M2 (ref 16–28)
ECHO LV INTERNAL DIMENSION DIASTOLIC: 5.19 CM (ref 4.2–5.9)
ECHO LV INTERNAL DIMENSION SYSTOLIC: 3.45 CM
ECHO LV IVSD: 1.06 CM (ref 0.6–1)
ECHO LV MASS 2D: 204 G (ref 88–224)
ECHO LV MASS INDEX 2D: 91 G/M2 (ref 49–115)
ECHO LV POSTERIOR WALL DIASTOLIC: 1.02 CM (ref 0.6–1)
ECHO LVOT DIAM: 1.96 CM
ECHO LVOT PEAK GRADIENT: 3.14 MMHG
ECHO LVOT PEAK GRADIENT: 3.16 MMHG
ECHO LVOT PEAK VELOCITY: 88.59 CM/S
ECHO LVOT PEAK VELOCITY: 88.81 CM/S
ECHO LVOT SV: 63.3 ML
ECHO LVOT VTI: 21.02 CM
ECHO MV A VELOCITY: 55.11 CENTIMETER/SECOND
ECHO MV E DECELERATION TIME (DT): 0.16 S
ECHO MV E VELOCITY: 81.63 CENTIMETER/SECOND
ECHO RA AREA 4C: 19.89 CM2
ECHO RIGHT VENTRICULAR SYSTOLIC PRESSURE (RVSP): 34.28 MMHG
ECHO TV REGURGITANT MAX VELOCITY: 246.39 CM/S
ECHO TV REGURGITANT PEAK GRADIENT: 24.28 MMHG
LVOT MG: 1.79 MMHG

## 2020-08-06 PROCEDURE — 93000 ELECTROCARDIOGRAM COMPLETE: CPT | Performed by: INTERNAL MEDICINE

## 2020-08-06 PROCEDURE — 93285 PRGRMG DEV EVAL SCRMS IP: CPT | Performed by: INTERNAL MEDICINE

## 2020-08-06 PROCEDURE — 99214 OFFICE O/P EST MOD 30 MIN: CPT | Performed by: INTERNAL MEDICINE

## 2020-08-06 PROCEDURE — 93293 PM PHONE R-STRIP DEVICE EVAL: CPT | Performed by: INTERNAL MEDICINE

## 2020-08-06 PROCEDURE — 93306 TTE W/DOPPLER COMPLETE: CPT | Performed by: INTERNAL MEDICINE

## 2020-08-06 NOTE — PROGRESS NOTES
1. Have you been to the ER, urgent care clinic since your last visit? Hospitalized since your last visit?   7- and 7- ED [facility] ER, irregular heartbeat. 2. Have you seen or consulted any other health care providers outside of the [address] [place] [address] since your last visit? Include any pap smears or colon screening. No    Chief Complaint   Patient presents with    Irregular Heart Beat     2 mo appt. C/O palps, fatigue.

## 2020-08-06 NOTE — LETTER
8/6/20 Patient: Mitali Rangel YOB: 1940 Date of Visit: 8/6/2020 Ciarra Chahal MD 
[address] 
[place] [address] VIA Facsimile: [phone] Dear Ciarra Chahal MD, Thank you for referring Mr. Luis Gilbert to [facility] for evaluation. My notes for this consultation are attached. If you have questions, please do not hesitate to call me. I look forward to following your patient along with you. Sincerely, Val Puente MD

## 2020-08-06 NOTE — PROGRESS NOTES
Subjective:      Jyoti Garcia is a 70 y.o. male is here for EP consult. He feels palpitations and presented to the ER last week. He was noted to have pvcs - he admits to be dehydrated that day due to an estate sale. Denies cp/sob      Patient Active Problem List    Diagnosis Date Noted    Irregular heartbeat 07/31/2018    S/P ablation of atrial fibrillation 04/22/2018    Cardiac tamponade 04/22/2018    A-fib ([address] 04/09/2018    Pericardial tamponade 04/09/2018    TIA (transient ischemic attack) 02/01/2018    Broca's aphasia 01/31/2018    Paroxysmal atrial fibrillation ([address] 01/31/2018    Chronic anticoagulation 62/23/8279    Systolic CHF, chronic ([address] 01/31/2018    Acquired hypothyroidism 01/31/2018    Essential hypertension 01/31/2018      Zeeshan Ashley MD  Past Medical History:   Diagnosis Date    Atrial fibrillation [facility])     CAD (coronary artery disease)     Hypertension     Hypothyroidism     Stroke [facility])       Past Surgical History:   Procedure Laterality Date    CARDIAC SURG PROCEDURE UNLIST  04/09/2018    cardiac ablation, implant permanent cardiac monitor-[employer]tr[employer]    CATH PERICARDIOCENTESIS  4/22/2018         HX APPENDECTOMY  1947    HX CATARACT REMOVAL  2009 x 2    HX THYROIDECTOMY  1950 Partial,    2003 Full     Allergies   Allergen Reactions    Chocolate Flavor Other (comments)     migraines      No family history on file.  negative for cardiac disease  Social History     Socioeconomic History    Marital status:      Spouse name: Not on file    Number of children: Not on file    Years of education: Not on file    Highest education level: Not on file   Tobacco Use    Smoking status: Never Smoker    Smokeless tobacco: Never Used   Substance and Sexual Activity    Alcohol use: No    Drug use: No    Sexual activity: Never     Current Outpatient Medications   Medication Sig    VIT C/E/ZN/COPPR/LUTEIN/ZEAXAN (PRESERVISION AREDS 2 PO) Take  by mouth two (2) times a day.  latanoprost (XALATAN) 0.005 % ophthalmic solution Administer 1 Drop to both eyes nightly.  levothyroxine (SYNTHROID) 75 mcg tablet Take 1 Tab by mouth Daily (before breakfast).  metoprolol succinate (TOPROL-XL) 25 mg XL tablet Take 25 mg by mouth daily.  timolol (TIMOPTIC) 0.5 % ophthalmic solution Administer 1 Drop to both eyes daily.  aspirin 81 mg chewable tablet Take 1 Tab by mouth daily.  rivaroxaban (XARELTO) 20 mg tab tablet Take 1 Tab by mouth daily (with dinner). Indications: PREVENT THROMBOEMBOLISM IN CHRONIC ATRIAL FIBRILLATION    tamsulosin (FLOMAX) 0.4 mg capsule Take 0.8 mg by mouth daily. No current facility-administered medications for this visit. Vitals:    08/06/20 1103   BP: 138/82   Pulse: 66   Resp: 18   SpO2: 98%   Weight: 200 lb 14.4 oz (91.1 kg)   Height: 6' 3\" (1.905 m)       I have reviewed the nurses notes, vitals, problem list, allergy list, medical history, family, social history and medications. Review of Symptoms:    General: Pt denies excessive weight gain or loss. Pt is able to conduct ADL's  HEENT: Denies blurred vision, headaches, hearing loss, epistaxis and difficulty swallowing. Respiratory: Denies cough, congestion, shortness of breath, DOWNS, wheezing or stridor. Cardiovascular: +palpitations, Denies precordial pain,edema or PND  Gastrointestinal: Denies poor appetite, indigestion, abdominal pain or blood in stool  Genitourinary: Denies hematuria, dysuria, increased urinary frequency  Musculoskeletal: Denies joint pain or swelling from muscles or joints  Neurologic: Denies tremor, paresthesias, headache, or sensory motor disturbance  Psychiatric: Denies confusion, insomnia, depression  Integumentray: Denies rash, itching or ulcers. Hematologic: Denies easy bruising, bleeding    Physical Exam:      General: Well developed, in no acute distress. HEENT: Eyes - PERRL, no jvd  Heart:  Normal S1/S2 negative S3 or S4.  Regular, no murmur, gallop or rub. Respiratory: Clear bilaterally x 4, no wheezing or rales  Abdomen:   Soft, non-tender, bowel sounds are active. Extremities:  No edema, normal cap refill, no cyanosis. Musculoskeletal: No clubbing  Neuro: A&Ox3, speech clear, gait stable. Skin: Skin color is normal. No rashes or lesions.  Non diaphoretic, no ulcers or subcutaneous nodule  Vascular: 2+ pulses symmetric in all extremities  Psych - judgement intact and orientation is wnl     Cardiographics    Ekg: nsr    ilr - pvcs    Results for orders placed or performed during the hospital encounter of 07/24/20   EKG, 12 LEAD, INITIAL   Result Value Ref Range    Ventricular Rate 97 BPM    Atrial Rate 97 BPM    P-R Interval 178 ms    QRS Duration 94 ms    Q-T Interval 368 ms    QTC Calculation (Bezet) 467 ms    Calculated P Axis 44 degrees    Calculated R Axis 23 degrees    Calculated T Axis 84 degrees    Diagnosis       Sinus rhythm with occasional and consecutive premature ventricular complexes  Nonspecific ST abnormality  Abnormal ECG  When compared with ECG of 22-JUL-2020 15:12,  premature ventricular complexes are now present  and minor NSSTTWC  Confirmed by Charlette Everett MD, --- ([id number]) on 7/25/2020 11:03:17 AM           Lab Results   Component Value Date/Time    WBC 5.9 07/24/2020 02:03 PM    HGB 12.2 07/24/2020 02:03 PM    HCT 36.1 (L) 07/24/2020 02:03 PM    PLATELET 913 96/00/9249 02:03 PM    MCV 85.1 07/24/2020 02:03 PM      Lab Results   Component Value Date/Time    Sodium 134 (L) 07/24/2020 02:03 PM    Potassium 3.3 (L) 07/24/2020 02:03 PM    Chloride 96 (L) 07/24/2020 02:03 PM    CO2 30 07/24/2020 02:03 PM    Anion gap 8 07/24/2020 02:03 PM    Glucose 130 (H) 07/24/2020 02:03 PM    BUN 15 07/24/2020 02:03 PM    Creatinine 0.79 07/24/2020 02:03 PM    BUN/Creatinine ratio 19 07/24/2020 02:03 PM    GFR est AA >60 07/24/2020 02:03 PM    GFR est non-AA >60 07/24/2020 02:03 PM    Calcium 8.6 07/24/2020 02:03 PM    Bilirubin, total 1.0 07/24/2020 02:03 PM    Alk. phosphatase 86 07/24/2020 02:03 PM    Protein, total 7.0 07/24/2020 02:03 PM    Albumin 4.0 07/24/2020 02:03 PM    Globulin 3.0 07/24/2020 02:03 PM    A-G Ratio 1.3 07/24/2020 02:03 PM    ALT (SGPT) 23 07/24/2020 02:03 PM         Assessment:     Assessment:        ICD-10-CM ICD-9-CM    1. Paroxysmal atrial fibrillation (HCC)  I48.0 427.31 AMB POC EKG ROUTINE W/ 12 LEADS, INTER & REP   2. Essential hypertension  I10 401.9    3. Systolic CHF, chronic (HCC)  I50.22 428.22      428.0    4. TIA (transient ischemic attack)  G45.9 435.9    5. PVC (premature ventricular contraction)  I49.3 427.69      Orders Placed This Encounter    AMB POC EKG ROUTINE W/ 12 LEADS, INTER & REP     Order Specific Question:   Reason for Exam:     Answer:   routine        Plan:   Sara Diaz is having palpitations - oftentimes in the setting of dehydration or low BS. His ILR demonstrates occasional ventricular bigeminy. We will obtian an echo to assess his lvef. If that is wnl, I explained to him that the pvcs are benign. They only warrant ablation if they are highly symptomatic. I encouraged him to hydrate and make sure that he eats timely meals. He understood the instructions. Cont oac for PAF. Cont med rx for htn and bph. Sara Diaz will follow up with me in one year and in the device clinic per routine. Continue medical management for af, tia, htn and pvcs. Thank you for allowing me to participate in Sara Diaz 's care.     Joan Alvarado MD, Chris Hernandez

## 2020-08-07 ENCOUNTER — TELEPHONE (OUTPATIENT)
Dept: CARDIOLOGY CLINIC | Age: 80
End: 2020-08-07

## 2020-08-07 NOTE — TELEPHONE ENCOUNTER
Verified patient with 2 identifiers   Advised that the echo was normal-everything looks good. Patient verified understanding.

## 2020-11-18 ENCOUNTER — CLINICAL SUPPORT (OUTPATIENT)
Dept: CARDIOLOGY CLINIC | Age: 80
End: 2020-11-18
Payer: MEDICARE

## 2020-11-18 DIAGNOSIS — I48.0 PAROXYSMAL ATRIAL FIBRILLATION (HCC): Primary | ICD-10-CM

## 2020-11-18 DIAGNOSIS — Z45.09 ENCOUNTER FOR LOOP RECORDER CHECK: ICD-10-CM

## 2020-11-18 PROCEDURE — 93285 PRGRMG DEV EVAL SCRMS IP: CPT | Performed by: INTERNAL MEDICINE

## 2021-02-23 ENCOUNTER — CLINICAL SUPPORT (OUTPATIENT)
Dept: CARDIOLOGY CLINIC | Age: 81
End: 2021-02-23
Payer: MEDICARE

## 2021-02-23 DIAGNOSIS — I48.0 PAROXYSMAL ATRIAL FIBRILLATION (HCC): Primary | ICD-10-CM

## 2021-02-23 DIAGNOSIS — Z45.09 ENCOUNTER FOR LOOP RECORDER CHECK: ICD-10-CM

## 2021-02-23 PROCEDURE — 93285 PRGRMG DEV EVAL SCRMS IP: CPT | Performed by: INTERNAL MEDICINE

## 2021-05-25 ENCOUNTER — OFFICE VISIT (OUTPATIENT)
Dept: CARDIOLOGY CLINIC | Age: 81
End: 2021-05-25
Payer: MEDICARE

## 2021-05-25 DIAGNOSIS — I48.0 PAROXYSMAL ATRIAL FIBRILLATION (HCC): Primary | ICD-10-CM

## 2021-05-25 DIAGNOSIS — Z45.09 ENCOUNTER FOR LOOP RECORDER CHECK: ICD-10-CM

## 2021-05-25 PROCEDURE — 93285 PRGRMG DEV EVAL SCRMS IP: CPT | Performed by: INTERNAL MEDICINE

## 2021-10-04 NOTE — PROGRESS NOTES
ELECTROPHYSIOLOGY        Subjective:      Luc Espinal is a 80 y.o. male is here for EP follow up. The patient denies chest pain/ shortness of breath, orthopnea, PND, LE edema, palpitations, syncope, presyncope or fatigue. Pt is off bb and oac per Dr Davina Egan.     Patient Active Problem List    Diagnosis Date Noted    Irregular heartbeat 07/31/2018    S/P ablation of atrial fibrillation 04/22/2018    Cardiac tamponade 04/22/2018    A-fib ([address] 04/09/2018    Pericardial tamponade 04/09/2018    TIA (transient ischemic attack) 02/01/2018    Broca's aphasia 01/31/2018    Paroxysmal atrial fibrillation ([address] 01/31/2018    Chronic anticoagulation 87/06/4394    Systolic CHF, chronic ([address] 01/31/2018    Acquired hypothyroidism 01/31/2018    Essential hypertension 01/31/2018      Jorge Mcbride MD  Past Medical History:   Diagnosis Date    Atrial fibrillation [facility])     CAD (coronary artery disease)     Cancer ([address]     Skin- basal and squamous     [facility]     Glaucoma     Hypertension     Hypothyroidism     Stroke [facility])       Past Surgical History:   Procedure Laterality Date    CATH PERICARDIOCENTESIS  4/22/2018         HX APPENDECTOMY  1947    HX CATARACT REMOVAL  2009 x 2    HX THYROIDECTOMY  1950 Partial,    2003 Full    [place] CARDIAC SURG PROCEDURE UNLIST  04/09/2018    cardiac ablation, implant permanent cardiac monitor-[employer]tr[employer]     Allergies   Allergen Reactions    Chocolate Flavor Other (comments)     migraines      History reviewed. No pertinent family history.  negative for cardiac disease  Social History     Socioeconomic History    Marital status:      Spouse name: Not on file    Number of children: Not on file    Years of education: Not on file    Highest education level: Not on file   Tobacco Use    Smoking status: Never Smoker    Smokeless tobacco: Never Used   Vaping Use    Vaping Use: Never used   Substance and Sexual Activity    Alcohol use: No    Drug use: No    Sexual activity: Never     Social Determinants of Health     Financial Resource Strain:     Difficulty of Paying Living Expenses:    Food Insecurity:     Worried About Running Out of Food in the Last Year:     [address] in the Last Year:    Transportation Needs:     Lack of Transportation (Medical):  Lack of Transportation (Non-Medical):    Physical Activity:     Days of Exercise per Week:     Minutes of Exercise per Session:    Stress:     Feeling of Stress :    Social Connections:     Frequency of Communication with Friends and Family:     Frequency of Social Gatherings with Friends and Family:     Attends Synagogue Services:     Active Member of Clubs or Organizations:     Attends Club or Organization Meetings:     Marital Status:      Current Outpatient Medications   Medication Sig    VIT C/E/ZN/COPPR/LUTEIN/ZEAXAN (PRESERVISION AREDS 2 PO) Take  by mouth two (2) times a day.  latanoprost (XALATAN) 0.005 % ophthalmic solution Administer 1 Drop to both eyes nightly.  aspirin 81 mg chewable tablet Take 1 Tab by mouth daily.  levothyroxine (SYNTHROID) 75 mcg tablet Take 1 Tab by mouth Daily (before breakfast).  timolol (TIMOPTIC) 0.5 % ophthalmic solution Administer 1 Drop to both eyes daily.  amLODIPine (NORVASC) 5 mg tablet Take 5 mg by mouth daily.  rivaroxaban (XARELTO) 20 mg tab tablet Take 1 Tab by mouth daily (with dinner). Indications: PREVENT THROMBOEMBOLISM IN CHRONIC ATRIAL FIBRILLATION (Patient not taking: Reported on 10/6/2021)    metoprolol succinate (TOPROL-XL) 25 mg XL tablet Take 25 mg by mouth daily. (Patient not taking: Reported on 10/6/2021)    tamsulosin (FLOMAX) 0.4 mg capsule Take 0.8 mg by mouth daily. (Patient not taking: Reported on 10/6/2021)     No current facility-administered medications for this visit.       Vitals:    10/06/21 1323   BP: 120/74   Pulse: 80   Resp: 18   SpO2: 98%   Weight: 191 lb 12.8 oz (87 kg)   Height: 6' 3\" (1.905 m)       I have reviewed the nurses notes, vitals, problem list, allergy list, medical history, family, social history and medications. Review of Symptoms:  11 systems reviewed, negative other than as stated in the HPI      Physical Exam:      General: Well developed, in no acute distress. HEENT: Eyes - PERRL  Heart:  Normal S1/S2 negative S3 or S4. Regular, no murmur  Respiratory: Clear bilaterally x 4, no wheezing or rales  Extremities:  No edema, no cyanosis. Musculoskeletal: No clubbing  Neuro: A&Ox3, speech clear  Skin: No visible rashes or lesions. Non diaphoretic.  No visible ulcers  Vascular: 2+ pulses symmetric in all extremities  Psych - judgement intact and orientation is wnl       Cardiographics    Ekg:  10/06/21  Sinus 73 bpm    Results for orders placed or performed during the hospital encounter of 07/24/20   EKG, 12 LEAD, INITIAL   Result Value Ref Range    Ventricular Rate 97 BPM    Atrial Rate 97 BPM    P-R Interval 178 ms    QRS Duration 94 ms    Q-T Interval 368 ms    QTC Calculation (Bezet) 467 ms    Calculated P Axis 44 degrees    Calculated R Axis 23 degrees    Calculated T Axis 84 degrees    Diagnosis       Sinus rhythm with occasional and consecutive premature ventricular complexes  Nonspecific ST abnormality  Abnormal ECG  When compared with ECG of 22-JUL-2020 15:12,  premature ventricular complexes are now present  and minor NSSTTWC  Confirmed by Sanjeev Ivy MD, --- ([id number]) on 7/25/2020 11:03:17 AM           Lab Results   Component Value Date/Time    WBC 5.9 07/24/2020 02:03 PM    HGB 12.2 07/24/2020 02:03 PM    HCT 36.1 (L) 07/24/2020 02:03 PM    PLATELET 089 03/79/2193 02:03 PM    MCV 85.1 07/24/2020 02:03 PM      Lab Results   Component Value Date/Time    Sodium 134 (L) 07/24/2020 02:03 PM    Potassium 3.3 (L) 07/24/2020 02:03 PM    Chloride 96 (L) 07/24/2020 02:03 PM    CO2 30 07/24/2020 02:03 PM    Anion gap 8 07/24/2020 02:03 PM    Glucose 130 (H) 07/24/2020 02:03 PM    BUN 15 07/24/2020 02:03 PM    Creatinine 0.79 07/24/2020 02:03 PM    BUN/Creatinine ratio 19 07/24/2020 02:03 PM    GFR est AA >60 07/24/2020 02:03 PM    GFR est non-AA >60 07/24/2020 02:03 PM    Calcium 8.6 07/24/2020 02:03 PM    Bilirubin, total 1.0 07/24/2020 02:03 PM    Alk. phosphatase 86 07/24/2020 02:03 PM    Protein, total 7.0 07/24/2020 02:03 PM    Albumin 4.0 07/24/2020 02:03 PM    Globulin 3.0 07/24/2020 02:03 PM    A-G Ratio 1.3 07/24/2020 02:03 PM    ALT (SGPT) 23 07/24/2020 02:03 PM      Lab Results   Component Value Date/Time    TSH 1.07 04/21/2018 08:15 PM           Assessment:           ICD-10-CM ICD-9-CM    1. Paroxysmal atrial fibrillation (HCC)  I48.0 427.31 AMB POC EKG ROUTINE W/ 12 LEADS, INTER & REP   2. PVC (premature ventricular contraction)  I49.3 427.69    3. Systolic CHF, chronic (HCC)  I50.22 428.22      428.0    4. Encounter for loop recorder check  Z45.09 V53.39    5. S/P ablation of atrial fibrillation  Z98.890 V45.89     Z86.79       Orders Placed This Encounter    AMB POC EKG ROUTINE W/ 12 LEADS, INTER & REP     Order Specific Question:   Reason for Exam:     Answer:   routine    amLODIPine (NORVASC) 5 mg tablet     Sig: Take 5 mg by mouth daily. Plan:     Mr Ericka Carpenter is here for annual follow up. [facility] is S/p AF ablation and ILR implant in 4/2018. He is doing well and denies cardiac complaints. EKG shows sinus rhythm. He is normotensive. ILR is without any events. He is on ASA with some ecchymosis. He will follow up with PCP re; cbc. He will return in 3 mo for ILR interrogation and see me in 1 year. He forgot his amlodipine yesterday. He will take 2.5 mg today and then 5 mg as usual artur am. Pt verbalizes understanding and is in agreement with the plan. Addressed all patient questions and concerns at this visit. Thank you for allowing me to participate in Loree Ruiz 's care.       Rell Sanchez NP

## 2021-10-06 ENCOUNTER — OFFICE VISIT (OUTPATIENT)
Dept: CARDIOLOGY CLINIC | Age: 81
End: 2021-10-06

## 2021-10-06 ENCOUNTER — OFFICE VISIT (OUTPATIENT)
Dept: CARDIOLOGY CLINIC | Age: 81
End: 2021-10-06
Payer: MEDICARE

## 2021-10-06 VITALS
HEIGHT: 75 IN | WEIGHT: 191.8 LBS | RESPIRATION RATE: 18 BRPM | OXYGEN SATURATION: 98 % | DIASTOLIC BLOOD PRESSURE: 74 MMHG | HEART RATE: 80 BPM | SYSTOLIC BLOOD PRESSURE: 120 MMHG | BODY MASS INDEX: 23.85 KG/M2

## 2021-10-06 DIAGNOSIS — I49.3 PVC (PREMATURE VENTRICULAR CONTRACTION): ICD-10-CM

## 2021-10-06 DIAGNOSIS — I48.0 PAROXYSMAL ATRIAL FIBRILLATION (HCC): Primary | ICD-10-CM

## 2021-10-06 DIAGNOSIS — Z45.09 ENCOUNTER FOR LOOP RECORDER CHECK: ICD-10-CM

## 2021-10-06 DIAGNOSIS — I50.22 SYSTOLIC CHF, CHRONIC (HCC): ICD-10-CM

## 2021-10-06 DIAGNOSIS — Z98.890 S/P ABLATION OF ATRIAL FIBRILLATION: ICD-10-CM

## 2021-10-06 DIAGNOSIS — Z86.79 S/P ABLATION OF ATRIAL FIBRILLATION: ICD-10-CM

## 2021-10-06 PROCEDURE — G8754 DIAS BP LESS 90: HCPCS | Performed by: NURSE PRACTITIONER

## 2021-10-06 PROCEDURE — G8536 NO DOC ELDER MAL SCRN: HCPCS | Performed by: NURSE PRACTITIONER

## 2021-10-06 PROCEDURE — 93000 ELECTROCARDIOGRAM COMPLETE: CPT | Performed by: NURSE PRACTITIONER

## 2021-10-06 PROCEDURE — G8752 SYS BP LESS 140: HCPCS | Performed by: NURSE PRACTITIONER

## 2021-10-06 PROCEDURE — 1101F PT FALLS ASSESS-DOCD LE1/YR: CPT | Performed by: NURSE PRACTITIONER

## 2021-10-06 PROCEDURE — 93285 PRGRMG DEV EVAL SCRMS IP: CPT | Performed by: INTERNAL MEDICINE

## 2021-10-06 PROCEDURE — 99214 OFFICE O/P EST MOD 30 MIN: CPT | Performed by: NURSE PRACTITIONER

## 2021-10-06 PROCEDURE — G8427 DOCREV CUR MEDS BY ELIG CLIN: HCPCS | Performed by: NURSE PRACTITIONER

## 2021-10-06 PROCEDURE — G8432 DEP SCR NOT DOC, RNG: HCPCS | Performed by: NURSE PRACTITIONER

## 2021-10-06 PROCEDURE — G8420 CALC BMI NORM PARAMETERS: HCPCS | Performed by: NURSE PRACTITIONER

## 2021-10-06 RX ORDER — AMLODIPINE BESYLATE 5 MG/1
5 TABLET ORAL DAILY
COMMUNITY
Start: 2021-07-28 | End: 2022-09-09

## 2021-10-06 NOTE — PROGRESS NOTES
Chief Complaint   Patient presents with    Pacemaker Check     Annual follow up - denies cardiac sx      1. Have you been to the ER, urgent care clinic since your last visit? Hospitalized since your last visit? No     2. Have you seen or consulted any other health care providers outside of the [address] [place] [address] since your last visit? Include any pap smears or colon screening.   Yes PCP

## 2022-01-12 ENCOUNTER — OFFICE VISIT (OUTPATIENT)
Dept: CARDIOLOGY CLINIC | Age: 82
End: 2022-01-12
Payer: MEDICARE

## 2022-01-12 DIAGNOSIS — I48.0 PAROXYSMAL ATRIAL FIBRILLATION (HCC): Primary | ICD-10-CM

## 2022-01-12 DIAGNOSIS — Z45.09 ENCOUNTER FOR LOOP RECORDER CHECK: ICD-10-CM

## 2022-01-12 PROCEDURE — 93285 PRGRMG DEV EVAL SCRMS IP: CPT | Performed by: INTERNAL MEDICINE

## 2022-03-18 PROBLEM — G45.9 TIA (TRANSIENT ISCHEMIC ATTACK): Status: ACTIVE | Noted: 2018-02-01

## 2022-03-18 PROBLEM — I48.91 A-FIB (HCC): Status: ACTIVE | Noted: 2018-04-09

## 2022-03-18 PROBLEM — Z79.01 CHRONIC ANTICOAGULATION: Status: ACTIVE | Noted: 2018-01-31

## 2022-03-18 PROBLEM — I48.0 PAROXYSMAL ATRIAL FIBRILLATION (HCC): Status: ACTIVE | Noted: 2018-01-31

## 2022-03-19 PROBLEM — Z86.79 S/P ABLATION OF ATRIAL FIBRILLATION: Status: ACTIVE | Noted: 2018-04-22

## 2022-03-19 PROBLEM — I50.22 SYSTOLIC CHF, CHRONIC (HCC): Status: ACTIVE | Noted: 2018-01-31

## 2022-03-19 PROBLEM — Z98.890 S/P ABLATION OF ATRIAL FIBRILLATION: Status: ACTIVE | Noted: 2018-04-22

## 2022-03-19 PROBLEM — R47.01 BROCA'S APHASIA: Status: ACTIVE | Noted: 2018-01-31

## 2022-03-19 PROBLEM — I31.4 PERICARDIAL TAMPONADE: Status: ACTIVE | Noted: 2018-04-09

## 2022-03-19 PROBLEM — I10 ESSENTIAL HYPERTENSION: Status: ACTIVE | Noted: 2018-01-31

## 2022-03-19 PROBLEM — I31.4 CARDIAC TAMPONADE: Status: ACTIVE | Noted: 2018-04-22

## 2022-03-19 PROBLEM — E03.9 ACQUIRED HYPOTHYROIDISM: Status: ACTIVE | Noted: 2018-01-31

## 2022-03-19 PROBLEM — I49.9 IRREGULAR HEARTBEAT: Status: ACTIVE | Noted: 2018-07-31

## 2022-03-28 ENCOUNTER — TRANSCRIBE ORDER (OUTPATIENT)
Dept: SCHEDULING | Age: 82
End: 2022-03-28

## 2022-03-28 DIAGNOSIS — M25.512 PAIN IN LEFT SHOULDER: ICD-10-CM

## 2022-03-28 DIAGNOSIS — M25.552 PAIN IN LEFT HIP: Primary | ICD-10-CM

## 2022-05-18 ENCOUNTER — TRANSCRIBE ORDER (OUTPATIENT)
Dept: REGISTRATION | Age: 82
End: 2022-05-18

## 2022-05-18 ENCOUNTER — HOSPITAL ENCOUNTER (OUTPATIENT)
Dept: GENERAL RADIOLOGY | Age: 82
Discharge: HOME | End: 2022-05-18
Payer: MEDICARE

## 2022-05-18 DIAGNOSIS — M25.562 LEFT KNEE PAIN: ICD-10-CM

## 2022-05-18 DIAGNOSIS — M25.562 LEFT KNEE PAIN: Primary | ICD-10-CM

## 2022-05-18 PROCEDURE — 73564 X-RAY EXAM KNEE 4 OR MORE: CPT

## 2022-09-08 ENCOUNTER — APPOINTMENT (OUTPATIENT)
Dept: MRI IMAGING | Age: 82
DRG: 069 | End: 2022-09-08
Attending: STUDENT IN AN ORGANIZED HEALTH CARE EDUCATION/TRAINING PROGRAM
Payer: MEDICARE

## 2022-09-08 ENCOUNTER — HOSPITAL ENCOUNTER (INPATIENT)
Age: 82
LOS: 1 days | Discharge: HOME | DRG: 069 | End: 2022-09-09
Attending: EMERGENCY MEDICINE | Admitting: STUDENT IN AN ORGANIZED HEALTH CARE EDUCATION/TRAINING PROGRAM
Payer: MEDICARE

## 2022-09-08 ENCOUNTER — APPOINTMENT (OUTPATIENT)
Dept: CT IMAGING | Age: 82
DRG: 069 | End: 2022-09-08
Attending: EMERGENCY MEDICINE
Payer: MEDICARE

## 2022-09-08 ENCOUNTER — APPOINTMENT (OUTPATIENT)
Dept: ULTRASOUND IMAGING | Age: 82
DRG: 069 | End: 2022-09-08
Attending: STUDENT IN AN ORGANIZED HEALTH CARE EDUCATION/TRAINING PROGRAM
Payer: MEDICARE

## 2022-09-08 DIAGNOSIS — R47.01 APHASIA: Primary | ICD-10-CM

## 2022-09-08 LAB
ALBUMIN SERPL-MCNC: 3.6 G/DL (ref 3.5–5)
ALBUMIN/GLOB SERPL: 1.2 {RATIO} (ref 1.1–2.2)
ALP SERPL-CCNC: 107 U/L (ref 45–117)
ALT SERPL-CCNC: 19 U/L (ref 12–78)
ANION GAP SERPL CALC-SCNC: 3 MMOL/L (ref 5–15)
APPEARANCE UR: CLEAR
AST SERPL-CCNC: 18 U/L (ref 15–37)
ATRIAL RATE: 85 BPM
BACTERIA URNS QL MICRO: NEGATIVE /HPF
BASOPHILS # BLD: 0 K/UL (ref 0–0.1)
BASOPHILS NFR BLD: 0 % (ref 0–1)
BILIRUB SERPL-MCNC: 0.4 MG/DL (ref 0.2–1)
BILIRUB UR QL: NEGATIVE
BUN SERPL-MCNC: 9 MG/DL (ref 6–20)
BUN/CREAT SERPL: 13 (ref 12–20)
CALCIUM SERPL-MCNC: 8.6 MG/DL (ref 8.5–10.1)
CALCULATED P AXIS, ECG09: 64 DEGREES
CALCULATED R AXIS, ECG10: 24 DEGREES
CALCULATED T AXIS, ECG11: 71 DEGREES
CHLORIDE SERPL-SCNC: 96 MMOL/L (ref 97–108)
CHOLEST SERPL-MCNC: 168 MG/DL
CO2 SERPL-SCNC: 32 MMOL/L (ref 21–32)
COLOR UR: ABNORMAL
CREAT SERPL-MCNC: 0.67 MG/DL (ref 0.7–1.3)
DIAGNOSIS, 93000: NORMAL
DIFFERENTIAL METHOD BLD: ABNORMAL
EOSINOPHIL # BLD: 0.1 K/UL (ref 0–0.4)
EOSINOPHIL NFR BLD: 1 % (ref 0–7)
EPITH CASTS URNS QL MICRO: ABNORMAL /LPF
ERYTHROCYTE [DISTWIDTH] IN BLOOD BY AUTOMATED COUNT: 13.1 % (ref 11.5–14.5)
GLOBULIN SER CALC-MCNC: 3 G/DL (ref 2–4)
GLUCOSE BLD STRIP.AUTO-MCNC: 126 MG/DL (ref 65–117)
GLUCOSE SERPL-MCNC: 121 MG/DL (ref 65–100)
GLUCOSE UR STRIP.AUTO-MCNC: NEGATIVE MG/DL
HCT VFR BLD AUTO: 40.7 % (ref 36.6–50.3)
HDLC SERPL-MCNC: 43 MG/DL
HDLC SERPL: 3.9 {RATIO} (ref 0–5)
HGB BLD-MCNC: 13.7 G/DL (ref 12.1–17)
HGB UR QL STRIP: ABNORMAL
IMM GRANULOCYTES # BLD AUTO: 0 K/UL (ref 0–0.04)
IMM GRANULOCYTES NFR BLD AUTO: 0 % (ref 0–0.5)
INR PPP: 1.1 (ref 0.9–1.1)
KETONES UR QL STRIP.AUTO: NEGATIVE MG/DL
LDLC SERPL CALC-MCNC: 117.8 MG/DL (ref 0–100)
LEUKOCYTE ESTERASE UR QL STRIP.AUTO: NEGATIVE
LYMPHOCYTES # BLD: 1 K/UL (ref 0.8–3.5)
LYMPHOCYTES NFR BLD: 10 % (ref 12–49)
MAGNESIUM SERPL-MCNC: 1.9 MG/DL (ref 1.6–2.4)
MCH RBC QN AUTO: 28.5 PG (ref 26–34)
MCHC RBC AUTO-ENTMCNC: 33.7 G/DL (ref 30–36.5)
MCV RBC AUTO: 84.6 FL (ref 80–99)
MONOCYTES # BLD: 0.8 K/UL (ref 0–1)
MONOCYTES NFR BLD: 8 % (ref 5–13)
NEUTS SEG # BLD: 7.5 K/UL (ref 1.8–8)
NEUTS SEG NFR BLD: 80 % (ref 32–75)
NITRITE UR QL STRIP.AUTO: NEGATIVE
NRBC # BLD: 0 K/UL (ref 0–0.01)
NRBC BLD-RTO: 0 PER 100 WBC
P-R INTERVAL, ECG05: 212 MS
PH UR STRIP: 8 [PH] (ref 5–8)
PLATELET # BLD AUTO: 191 K/UL (ref 150–400)
PMV BLD AUTO: 9.8 FL (ref 8.9–12.9)
POTASSIUM SERPL-SCNC: 3.9 MMOL/L (ref 3.5–5.1)
PROT SERPL-MCNC: 6.6 G/DL (ref 6.4–8.2)
PROT UR STRIP-MCNC: NEGATIVE MG/DL
PROTHROMBIN TIME: 10.9 SEC (ref 9–11.1)
Q-T INTERVAL, ECG07: 386 MS
QRS DURATION, ECG06: 94 MS
QTC CALCULATION (BEZET), ECG08: 459 MS
RBC # BLD AUTO: 4.81 M/UL (ref 4.1–5.7)
RBC #/AREA URNS HPF: ABNORMAL /HPF (ref 0–5)
SERVICE CMNT-IMP: ABNORMAL
SODIUM SERPL-SCNC: 131 MMOL/L (ref 136–145)
SP GR UR REFRACTOMETRY: 1.01 (ref 1–1.03)
TRIGL SERPL-MCNC: 36 MG/DL (ref ?–150)
TROPONIN-HIGH SENSITIVITY: 5 NG/L (ref 0–76)
UA: UC IF INDICATED,UAUC: ABNORMAL
UROBILINOGEN UR QL STRIP.AUTO: 0.2 EU/DL (ref 0.2–1)
VENTRICULAR RATE, ECG03: 85 BPM
VLDLC SERPL CALC-MCNC: 7.2 MG/DL
WBC # BLD AUTO: 9.4 K/UL (ref 4.1–11.1)
WBC URNS QL MICRO: ABNORMAL /HPF (ref 0–4)

## 2022-09-08 PROCEDURE — 97530 THERAPEUTIC ACTIVITIES: CPT | Performed by: OCCUPATIONAL THERAPIST

## 2022-09-08 PROCEDURE — 97116 GAIT TRAINING THERAPY: CPT | Performed by: PHYSICAL THERAPIST

## 2022-09-08 PROCEDURE — 74011000250 HC RX REV CODE- 250: Performed by: STUDENT IN AN ORGANIZED HEALTH CARE EDUCATION/TRAINING PROGRAM

## 2022-09-08 PROCEDURE — 74011000636 HC RX REV CODE- 636

## 2022-09-08 PROCEDURE — 93306 TTE W/DOPPLER COMPLETE: CPT

## 2022-09-08 PROCEDURE — 92523 SPEECH SOUND LANG COMPREHEN: CPT

## 2022-09-08 PROCEDURE — 70450 CT HEAD/BRAIN W/O DYE: CPT

## 2022-09-08 PROCEDURE — 81001 URINALYSIS AUTO W/SCOPE: CPT

## 2022-09-08 PROCEDURE — 94762 N-INVAS EAR/PLS OXIMTRY CONT: CPT

## 2022-09-08 PROCEDURE — 65270000046 HC RM TELEMETRY

## 2022-09-08 PROCEDURE — 74011250637 HC RX REV CODE- 250/637: Performed by: EMERGENCY MEDICINE

## 2022-09-08 PROCEDURE — 94760 N-INVAS EAR/PLS OXIMETRY 1: CPT

## 2022-09-08 PROCEDURE — 36415 COLL VENOUS BLD VENIPUNCTURE: CPT

## 2022-09-08 PROCEDURE — 99285 EMERGENCY DEPT VISIT HI MDM: CPT

## 2022-09-08 PROCEDURE — 83735 ASSAY OF MAGNESIUM: CPT

## 2022-09-08 PROCEDURE — 74011250637 HC RX REV CODE- 250/637: Performed by: STUDENT IN AN ORGANIZED HEALTH CARE EDUCATION/TRAINING PROGRAM

## 2022-09-08 PROCEDURE — 96374 THER/PROPH/DIAG INJ IV PUSH: CPT

## 2022-09-08 PROCEDURE — 80053 COMPREHEN METABOLIC PANEL: CPT

## 2022-09-08 PROCEDURE — 80061 LIPID PANEL: CPT

## 2022-09-08 PROCEDURE — 74011250636 HC RX REV CODE- 250/636: Performed by: STUDENT IN AN ORGANIZED HEALTH CARE EDUCATION/TRAINING PROGRAM

## 2022-09-08 PROCEDURE — 4A13X51 MONITORING OF ARTERIAL FLOW, PERIPHERAL, EXTERNAL APPROACH: ICD-10-PCS | Performed by: INTERNAL MEDICINE

## 2022-09-08 PROCEDURE — 74011250636 HC RX REV CODE- 250/636: Performed by: EMERGENCY MEDICINE

## 2022-09-08 PROCEDURE — 85025 COMPLETE CBC W/AUTO DIFF WBC: CPT

## 2022-09-08 PROCEDURE — 93005 ELECTROCARDIOGRAM TRACING: CPT

## 2022-09-08 PROCEDURE — 85610 PROTHROMBIN TIME: CPT

## 2022-09-08 PROCEDURE — 70496 CT ANGIOGRAPHY HEAD: CPT

## 2022-09-08 PROCEDURE — 82962 GLUCOSE BLOOD TEST: CPT

## 2022-09-08 PROCEDURE — 97162 PT EVAL MOD COMPLEX 30 MIN: CPT | Performed by: PHYSICAL THERAPIST

## 2022-09-08 PROCEDURE — 70551 MRI BRAIN STEM W/O DYE: CPT

## 2022-09-08 PROCEDURE — 97535 SELF CARE MNGMENT TRAINING: CPT | Performed by: OCCUPATIONAL THERAPIST

## 2022-09-08 PROCEDURE — 84484 ASSAY OF TROPONIN QUANT: CPT

## 2022-09-08 PROCEDURE — 97165 OT EVAL LOW COMPLEX 30 MIN: CPT | Performed by: OCCUPATIONAL THERAPIST

## 2022-09-08 RX ORDER — SODIUM CHLORIDE 9 MG/ML
100 INJECTION, SOLUTION INTRAVENOUS CONTINUOUS
Status: DISCONTINUED | OUTPATIENT
Start: 2022-09-08 | End: 2022-09-09 | Stop reason: HOSPADM

## 2022-09-08 RX ORDER — ACETAMINOPHEN 650 MG/1
650 SUPPOSITORY RECTAL
Status: DISCONTINUED | OUTPATIENT
Start: 2022-09-08 | End: 2022-09-09 | Stop reason: HOSPADM

## 2022-09-08 RX ORDER — LEVOTHYROXINE SODIUM 75 UG/1
75 TABLET ORAL
Status: DISCONTINUED | OUTPATIENT
Start: 2022-09-08 | End: 2022-09-09 | Stop reason: HOSPADM

## 2022-09-08 RX ORDER — ONDANSETRON 2 MG/ML
4 INJECTION INTRAMUSCULAR; INTRAVENOUS
Status: COMPLETED | OUTPATIENT
Start: 2022-09-08 | End: 2022-09-08

## 2022-09-08 RX ORDER — TIMOLOL MALEATE 5 MG/ML
1 SOLUTION/ DROPS OPHTHALMIC DAILY
Status: DISCONTINUED | OUTPATIENT
Start: 2022-09-08 | End: 2022-09-09 | Stop reason: HOSPADM

## 2022-09-08 RX ORDER — ASPIRIN 325 MG
325 TABLET ORAL ONCE
Status: COMPLETED | OUTPATIENT
Start: 2022-09-08 | End: 2022-09-08

## 2022-09-08 RX ORDER — ACETAMINOPHEN 325 MG/1
650 TABLET ORAL
Status: DISCONTINUED | OUTPATIENT
Start: 2022-09-08 | End: 2022-09-08 | Stop reason: SDUPTHER

## 2022-09-08 RX ORDER — ENOXAPARIN SODIUM 100 MG/ML
40 INJECTION SUBCUTANEOUS EVERY 24 HOURS
Status: DISCONTINUED | OUTPATIENT
Start: 2022-09-08 | End: 2022-09-08

## 2022-09-08 RX ORDER — ATORVASTATIN CALCIUM 40 MG/1
40 TABLET, FILM COATED ORAL
Status: DISCONTINUED | OUTPATIENT
Start: 2022-09-08 | End: 2022-09-09 | Stop reason: HOSPADM

## 2022-09-08 RX ORDER — GUAIFENESIN 100 MG/5ML
81 LIQUID (ML) ORAL DAILY
Status: DISCONTINUED | OUTPATIENT
Start: 2022-09-08 | End: 2022-09-08

## 2022-09-08 RX ORDER — GUAIFENESIN 100 MG/5ML
81 LIQUID (ML) ORAL DAILY
Status: DISCONTINUED | OUTPATIENT
Start: 2022-09-09 | End: 2022-09-09 | Stop reason: HOSPADM

## 2022-09-08 RX ORDER — ACETAMINOPHEN 325 MG/1
650 TABLET ORAL
Status: DISCONTINUED | OUTPATIENT
Start: 2022-09-08 | End: 2022-09-09 | Stop reason: HOSPADM

## 2022-09-08 RX ORDER — TRIAMCINOLONE ACETONIDE 0.25 MG/G
CREAM TOPICAL
COMMUNITY
End: 2022-09-09

## 2022-09-08 RX ORDER — BETAMETHASONE DIPROPIONATE 0.5 MG/G
CREAM TOPICAL
COMMUNITY
End: 2022-09-09

## 2022-09-08 RX ORDER — ENOXAPARIN SODIUM 100 MG/ML
1 INJECTION SUBCUTANEOUS EVERY 12 HOURS
Status: DISCONTINUED | OUTPATIENT
Start: 2022-09-08 | End: 2022-09-09 | Stop reason: HOSPADM

## 2022-09-08 RX ADMIN — ACETAMINOPHEN 650 MG: 325 TABLET ORAL at 10:35

## 2022-09-08 RX ADMIN — ONDANSETRON 4 MG: 2 INJECTION INTRAMUSCULAR; INTRAVENOUS at 03:25

## 2022-09-08 RX ADMIN — ASPIRIN 325 MG ORAL TABLET 325 MG: 325 PILL ORAL at 04:31

## 2022-09-08 RX ADMIN — ENOXAPARIN SODIUM 80 MG: 80 INJECTION SUBCUTANEOUS at 20:40

## 2022-09-08 RX ADMIN — ATORVASTATIN CALCIUM 40 MG: 40 TABLET, FILM COATED ORAL at 22:05

## 2022-09-08 RX ADMIN — TIMOLOL MALEATE 1 DROP: 5 SOLUTION OPHTHALMIC at 14:43

## 2022-09-08 RX ADMIN — ENOXAPARIN SODIUM 40 MG: 100 INJECTION SUBCUTANEOUS at 10:35

## 2022-09-08 RX ADMIN — LEVOTHYROXINE SODIUM 75 MCG: 0.07 TABLET ORAL at 10:50

## 2022-09-08 RX ADMIN — SODIUM CHLORIDE 100 ML/HR: 9 INJECTION, SOLUTION INTRAVENOUS at 19:46

## 2022-09-08 RX ADMIN — IOPAMIDOL 100 ML: 755 INJECTION, SOLUTION INTRAVENOUS at 02:41

## 2022-09-08 NOTE — H&P
History and Physical    Patient: Paxton Hernandez MRN: [id number]  SSN: [id number]    YOB: 1940  Age: 80 y.o. Sex: male      Subjective:      Chief Complaint: Right upper extremity weakness and aphasia    HPI: Paxton Hernandez is a 80 y.o. male with past medical history of atrial fibrillation status post ablation and off anticoagulation, TIA, hypothyroidism, hypertension, who presents to the hospital for evaluation of right upper extremity weakness/numbness and speech difficulty. History obtained from patient but mostly wife who was present at bedside. Patient developed right upper extremity numbness and weakness on 9/7 which lasted for 2 to 3 hours. She was not too concerned as these episodes are not uncommon and typically resolve spontaneously. However, around 1 AM this morning, patient developed expressive aphasia which worried the wife and patient was brought in for evaluation. Code stroke was called in the ED and initial imaging studies were negative. Neurology was consulted and recommended admission for stroke work-up and MRI. Patient has only been taking aspirin for at least 1 year if not longer. Apparently, since he had not had a TIA in a while and he had bruising of his arms, he pleaded with his doctors to discontinue the Xarelto. Unclear if this was discontinued and the patient discontinued it on his own. Patient has had multiple prior TIAs that manifest as expressive aphasia and right upper extremity numbness/weakness. His last 1 was over 2 years ago. During his initial work-up, he was found to have atrial fibrillation and was treated with anticoagulation. He had breakthrough TIAs while on Xarelto so aspirin was added in addition. He subsequently had an atrial fibrillation ablation which was apparently successful as his subsequent implantable loop recorder showed mostly sinus rhythm on interrogations (per chart review).   Because there is no [employer] service and call morning, the loop recorder cannot wirelessly transmit information. Past Medical History:   Diagnosis Date    Atrial fibrillation [facility])     CAD (coronary artery disease)     Cancer (HCC)     Skin- basal and squamous     Congestive heart failure ([address]     Glaucoma     Hypertension     Hypothyroidism     Stroke [facility])      Past Surgical History:   Procedure Laterality Date    CATH PERICARDIOCENTESIS  4/22/2018         HX APPENDECTOMY  1947    HX CATARACT REMOVAL  2009 x 2    HX THYROIDECTOMY  1950 Partial,    2003 Full    [place] CARDIAC SURG PROCEDURE UNLIST  04/09/2018    cardiac ablation, implant permanent cardiac monitor-[employer]      History reviewed. No pertinent family history. Social History     Tobacco Use    Smoking status: Never    Smokeless tobacco: Never   Substance Use Topics    Alcohol use: No      Prior to Admission medications    Medication Sig Start Date End Date Taking? Authorizing Provider   amLODIPine (NORVASC) 5 mg tablet Take 5 mg by mouth daily. 7/28/21   Provider, Historical   VIT C/E/ZN/COPPR/LUTEIN/ZEAXAN (PRESERVISION AREDS 2 PO) Take  by mouth two (2) times a day. Provider, Historical   latanoprost (XALATAN) 0.005 % ophthalmic solution Administer 1 Drop to both eyes nightly. 2/22/18   Provider, Historical   aspirin 81 mg chewable tablet Take 1 Tab by mouth daily. 2/2/18   Inocencio Mayes MD   levothyroxine (SYNTHROID) 75 mcg tablet Take 1 Tab by mouth Daily (before breakfast). 2/2/18   Inocencio Mayes MD   timolol (TIMOPTIC) 0.5 % ophthalmic solution Administer 1 Drop to both eyes daily.     Other, MD Pat        Allergies   Allergen Reactions    Chocolate Flavor Other (comments)     migraines       Review of Systems:  A 10 point review of systems was conducted and is negative unless noted in the HPI      Objective:     Vitals:    09/08/22 0454 09/08/22 0504 09/08/22 0515 09/08/22 0556   BP:  (!) 150/81 (!) 149/79 (!) 142/79   Pulse: 85 89 89 89   Resp: 23 25 22 22   Temp:   98.5 °F (36.9 °C) 98.5 °F (36.9 °C)   SpO2: 95% 96% 96% 95%   Weight:       Height:            Physical Exam:  General: Alert and Oriented x 3. Cooperative and friendly. No acute distress. HEAD: Normocephalic, atraumatic. Eye: PERRLA, EOMI. Throat and Neck: normal and no erythema or exudates noted. No mass   Lung: Clear to auscultation bilaterally without wheezes, rhonchi. Good air movement bilaterally. Heart: Regular rate and rhythm. Normal S1/S2. NO appreciated murmurs, rubs or gallops. Abdomen: soft, non-tender. Bowel sounds present in all four quadrants. No masses appreciated. Extremities:  able to move all extremities normal, atraumatic  Skin: Clean, dry and intact without appreciated lesions. Neurologic: Short term memory impairment (asked my name 4-5 time in 10 minutes), Cns 2-12 intact, str 4+/5 in RUE otherwise 5/5 throughout. Sensation to light touch appears to be equal bilaterally, but patient struggles indicating which side he feels things on. Patient has some word finding difficulties and is also using the wrong words, but no slurred speech. Comprehension is impaired and he struggles with following simple commands especially with his eyes open (folding a piece of paper, making a thumbs up). Visual fields intact. Negative Romberg and pronator drift. Normal FNF. Unable to perform heel to shin. Recent Labs     09/08/22  0245   WBC 9.4   HGB 13.7   HCT 40.7        Recent Labs     09/08/22  0245   *   K 3.9   CL 96*   CO2 32   *   BUN 9   CREA 0.67*   CA 8.6   MG 1.9   ALB 3.6   TBILI 0.4   ALT 19   INR 1.1     No results for input(s): PH, PCO2, PO2, HCO3, FIO2 in the last 72 hours. CTA CODE NEURO HEAD AND NECK W CONT   Final Result   CTA Head:   1. No evidence of significant stenosis or aneurysm. CTA Neck:   1. No evidence of significant stenosis. CT CODE NEURO HEAD WO CONTRAST   Final Result   1. No evidence of acute intracranial abnormality.          MRI BRAIN WO CONT (Results Pending)         CBC:   Lab Results   Component Value Date/Time    WBC 9.4 09/08/2022 02:45 AM    RBC 4.81 09/08/2022 02:45 AM    HGB 13.7 09/08/2022 02:45 AM    HCT 40.7 09/08/2022 02:45 AM    PLATELET 495 82/66/1447 02:45 AM     BMP:   Lab Results   Component Value Date/Time    Glucose 121 (H) 09/08/2022 02:45 AM    Sodium 131 (L) 09/08/2022 02:45 AM    Potassium 3.9 09/08/2022 02:45 AM    Chloride 96 (L) 09/08/2022 02:45 AM    CO2 32 09/08/2022 02:45 AM    BUN 9 09/08/2022 02:45 AM    Creatinine 0.67 (L) 09/08/2022 02:45 AM    Calcium 8.6 09/08/2022 02:45 AM     Radiology review:       Assessment:   Lidya Bowman is a 80 y.o. male with past medical history of atrial fibrillation status post ablation and off anticoagulation, TIA, hypothyroidism, hypertension, who presents to the hospital for evaluation of right upper extremity weakness/numbness and speech difficulty. MRI negative for patient with persistent language deficits. Neurology consulted and recommending transfer to [facility] for EEG and possible and possible LP    Plan:   Expressive and receptive aphasia  Apraxia  History of TIA  -MRI negative. Neurology consulted and recommending transfer to [facility] for EEG and possible and possible LP. -Appreciate SLP evaluation  -Continue with frequent neurochecks  -Patient should also be on anticoagulation. He has been in sinus rhythm with frequent PVCs while here in the hospital.  Needs to have ILR interrogated to see if he has been having recurrent A. fib. Since he might get an LP in the future if his EEG is negative, we will start him on Lovenox for now which can be held prior to LP. Continue aspirin  -Patient not on statin. LDL checked and is 117. Start high intensity statin  -Defer hemoglobin A1c as patient's glucose is normal    Hyponatremia: Mild and I doubt this is contributing to his symptoms. Recheck tomorrow after IV fluids    History of A. fib:  -Resume anticoagulation as above  -Currently in sinus rhythm with frequent PVCs.   Not on any rate control agent, defer to outpatient cardiologist  -It would be useful to have his loop recorder interrogated to see if he has been having episodes of A. fib    Hypothyroidism: Continue home Synthroid    Hypertension: Continue with permissive hypertension for now      Safety:  Code Status: Full Code  DVT prophylaxis: lovenox  Family Contact Info:  Primary Emergency Contact: Mandi Celeste \"Inocencia\", Home Phone: [phone]  Disposition:  Consults:  Signed By: Nia Kay MD     September 8, 2022

## 2022-09-08 NOTE — ED NOTES
TRANSFER - OUT REPORT:    Verbal report given to Rah Pelletier on Luis E Ibarra  being transferred to [place] [id number] for routine progression of care       Report consisted of patients Situation, Background, Assessment and   Recommendations(SBAR). Information from the following report(s) SBAR, Kardex, ED Summary, MAR, Recent Results and Med Rec Status was reviewed with the receiving nurse. Lines:   Peripheral IV 09/08/22 Left Antecubital (Active)       Peripheral IV 09/08/22 Left Antecubital (Active)   Site Assessment Clean, dry, & intact 09/08/22 0242   Phlebitis Assessment 0 09/08/22 0242   Infiltration Assessment 0 09/08/22 0242   Dressing Status Clean, dry, & intact 09/08/22 0242        Opportunity for questions and clarification was provided.       Patient transported with:   Registered Nurse

## 2022-09-08 NOTE — ED PROVIDER NOTES
EMERGENCY DEPARTMENT HISTORY AND PHYSICAL EXAM      Date: 9/8/2022  Patient Name: Brittney Kaplan    History of Presenting Illness     Chief Complaint   Patient presents with    Aphasia       History Provided By: Patient    HPI: Brittney Kaplan, 80 y.o. male with PMHx as noted below presents the emergency department for evaluation of speech difficulty. History is limited on arrival due to patient being unable to speak. Per patient's wife, symptoms began yesterday around 4:30 PM with complaints of some intermittent right arm weakness and numbness. The symptoms seem to come and go, unclear if they completely resolved or which is wax and wane. Yesterday evening went to bed at approximately 10:30 PM and then woke up around 1 with speech difficulty prompting his wife to bring him to the emergency department. Otherwise he reports he been at his baseline state of health prior to this afternoon. Patient does have a history of atrial fibrillation, uncertain if he is still taking Xarelto      PCP: Cinda Gutierrez MD    Current Facility-Administered Medications   Medication Dose Route Frequency Provider Last Rate Last Admin    acetaminophen (TYLENOL) tablet 650 mg  650 mg Oral Q6H PRN Kendell Diaz MD           Past History     Past Medical History:  Past Medical History:   Diagnosis Date    Atrial fibrillation ([facility] [address]     CAD (coronary artery disease)     Cancer ([facility] [address]     Skin- basal and squamous     Congestive heart failure ([facility] [address]     Glaucoma     Hypertension     Hypothyroidism     Stroke [facility])        Past Surgical History:  Past Surgical History:   Procedure Laterality Date    CATH PERICARDIOCENTESIS  4/22/2018         HX APPENDECTOMY  1947    HX CATARACT REMOVAL  2009 x 2    HX THYROIDECTOMY  1950 Partial,    2003 Full    [place] CARDIAC SURG PROCEDURE UNLIST  04/09/2018    cardiac ablation, implant permanent cardiac monitor-[employer]       Family History:  History reviewed. No pertinent family history.     Social History:  Social History     Tobacco Use    Smoking status: Never    Smokeless tobacco: Never   Vaping Use    Vaping Use: Never used   Substance Use Topics    Alcohol use: No    Drug use: No       Allergies: Allergies   Allergen Reactions    Chocolate Flavor Other (comments)     migraines         Review of Systems   Review of Systems   Unable to perform ROS: Patient nonverbal   Due to aphasia    Physical Exam   Physical Exam    GENERAL: alert, acute distress  EYES: PEERL, No injection, discharge or icterus. ENT: Mucous membranes pink and moist.  NECK: Supple  LUNGS: Airway patent. Non-labored respirations. Breath sounds clear with good air entry bilaterally. HEART: Regular rate and rhythm. No peripheral edema  ABDOMEN: Non-distended and non-tender, without guarding or rebound. SKIN:  warm, dry  EXTREMITIES: Without swelling, tenderness or deformity, symmetric with normal ROM  NEUROLOGICAL: Alert, expressive aphasia, global weakness in all extremities, cranial nerves II through XII otherwise grossly intact      Diagnostic Study Results     Labs -     Recent Results (from the past 12 hour(s))   GLUCOSE, POC    Collection Time: 09/08/22  2:17 AM   Result Value Ref Range    Glucose (POC) 126 (H) 65 - 117 mg/dL    Performed by Rachelle Ramirez    CBC WITH AUTOMATED DIFF    Collection Time: 09/08/22  2:45 AM   Result Value Ref Range    WBC 9.4 4.1 - 11.1 K/uL    RBC 4.81 4.10 - 5.70 M/uL    HGB 13.7 12.1 - 17.0 g/dL    HCT 40.7 36.6 - 50.3 %    MCV 84.6 80.0 - 99.0 FL    MCH 28.5 26.0 - 34.0 PG    MCHC 33.7 30.0 - 36.5 g/dL    RDW 13.1 11.5 - 14.5 %    PLATELET 454 214 - 230 K/uL    MPV 9.8 8.9 - 12.9 FL    NRBC 0.0 0  WBC    ABSOLUTE NRBC 0.00 0.00 - 0.01 K/uL    NEUTROPHILS 80 (H) 32 - 75 %    LYMPHOCYTES 10 (L) 12 - 49 %    MONOCYTES 8 5 - 13 %    EOSINOPHILS 1 0 - 7 %    BASOPHILS 0 0 - 1 %    IMMATURE GRANULOCYTES 0 0.0 - 0.5 %    ABS. NEUTROPHILS 7.5 1.8 - 8.0 K/UL    ABS.  LYMPHOCYTES 1.0 0.8 - 3.5 K/UL    ABS. MONOCYTES 0.8 0.0 - 1.0 K/UL    ABS. EOSINOPHILS 0.1 0.0 - 0.4 K/UL    ABS. BASOPHILS 0.0 0.0 - 0.1 K/UL    ABS. IMM. GRANS. 0.0 0.00 - 0.04 K/UL    DF AUTOMATED     METABOLIC PANEL, COMPREHENSIVE    Collection Time: 09/08/22  2:45 AM   Result Value Ref Range    Sodium 131 (L) 136 - 145 mmol/L    Potassium 3.9 3.5 - 5.1 mmol/L    Chloride 96 (L) 97 - 108 mmol/L    CO2 32 21 - 32 mmol/L    Anion gap 3 (L) 5 - 15 mmol/L    Glucose 121 (H) 65 - 100 mg/dL    BUN 9 6 - 20 MG/DL    Creatinine 0.67 (L) 0.70 - 1.30 MG/DL    BUN/Creatinine ratio 13 12 - 20      GFR est AA >60 >60 ml/min/1.73m2    GFR est non-AA >60 >60 ml/min/1.73m2    Calcium 8.6 8.5 - 10.1 MG/DL    Bilirubin, total 0.4 0.2 - 1.0 MG/DL    ALT (SGPT) 19 12 - 78 U/L    AST (SGOT) 18 15 - 37 U/L    Alk.  phosphatase 107 45 - 117 U/L    Protein, total 6.6 6.4 - 8.2 g/dL    Albumin 3.6 3.5 - 5.0 g/dL    Globulin 3.0 2.0 - 4.0 g/dL    A-G Ratio 1.2 1.1 - 2.2     PROTHROMBIN TIME + INR    Collection Time: 09/08/22  2:45 AM   Result Value Ref Range    INR 1.1 0.9 - 1.1      Prothrombin time 10.9 9.0 - 11.1 sec   MAGNESIUM    Collection Time: 09/08/22  2:45 AM   Result Value Ref Range    Magnesium 1.9 1.6 - 2.4 mg/dL   TROPONIN-HIGH SENSITIVITY    Collection Time: 09/08/22  2:45 AM   Result Value Ref Range    Troponin-High Sensitivity 5 0 - 76 ng/L   URINALYSIS W/ REFLEX CULTURE    Collection Time: 09/08/22  2:55 AM    Specimen: Urine   Result Value Ref Range    Color YELLOW/STRAW      Appearance CLEAR CLEAR      Specific gravity 1.010 1.003 - 1.030      pH (UA) 8.0 5.0 - 8.0      Protein Negative NEG mg/dL    Glucose Negative NEG mg/dL    Ketone Negative NEG mg/dL    Bilirubin Negative NEG      Blood TRACE (A) NEG      Urobilinogen 0.2 0.2 - 1.0 EU/dL    Nitrites Negative NEG      Leukocyte Esterase Negative NEG      WBC 0-4 0 - 4 /hpf    RBC 5-10 0 - 5 /hpf    Epithelial cells FEW FEW /lpf    Bacteria Negative NEG /hpf    UA:UC IF INDICATED CULTURE NOT INDICATED BY UA RESULT CNI     EKG, 12 LEAD, INITIAL    Collection Time: 09/08/22  2:58 AM   Result Value Ref Range    Ventricular Rate 85 BPM    Atrial Rate 85 BPM    P-R Interval 212 ms    QRS Duration 94 ms    Q-T Interval 386 ms    QTC Calculation (Bezet) 459 ms    Calculated P Axis 64 degrees    Calculated R Axis 24 degrees    Calculated T Axis 71 degrees    Diagnosis       Sinus rhythm with 1st degree AV block  Possible Left atrial enlargement  Borderline ECG  When compared with ECG of 24-JUL-2020 15:51,  premature ventricular complexes are no longer present  [place] interval has increased         Radiologic Studies -   CTA CODE NEURO HEAD AND NECK W CONT   Final Result   CTA Head:   1. No evidence of significant stenosis or aneurysm. CTA Neck:   1. No evidence of significant stenosis. CT CODE NEURO HEAD WO CONTRAST   Final Result   1. No evidence of acute intracranial abnormality. MRI BRAIN WO CONT    (Results Pending)     CT Results  (Last 48 hours)                 09/08/22 0239  CTA CODE NEURO HEAD AND NECK W CONT Final result    Impression:  CTA Head:   1. No evidence of significant stenosis or aneurysm. CTA Neck:   1. No evidence of significant stenosis. Narrative:  EXAM:  CTA CODE NEURO HEAD AND NECK W CONT       INDICATION:   Code Stroke       COMPARISON:  CT head 9/8/2022. CONTRAST:  100 mL of Isovue-370. TECHNIQUE:  Unenhanced  images were obtained to localize the volume for   acquisition. Multislice helical axial CT angiography was performed from the   aortic arch to the top of the head during uneventful rapid bolus intravenous   contrast administration. Coronal and sagittal reformations and 3D post   processing was performed. CT dose reduction was achieved through use of a   standardized protocol tailored for this examination and automatic exposure   control for dose modulation. This study was analyzed by the [address]  algorithm. FINDINGS:       CTA Head:   There is no evidence of large vessel occlusion or flow-limiting stenosis of the   intracranial internal carotid, anterior cerebral, and middle cerebral arteries. Mild calcification of the bilateral carotid siphons without significant stenosis   The anterior communicating artery is patent with fenestration. There is no evidence of large vessel occlusion or flow-limiting stenosis of the   intracranial vertebral arteries, basilar artery, or posterior cerebral arteries. The posterior communicating arteries are patent. There is no evidence of aneurysm or vascular malformation. The dural venous   sinuses and deep cerebral venous system are patent. No evidence of abnormal   enhancement on delayed phase images. CTA NECK:   NASCET method was utilized for calculating stenosis. The aortic arch is unremarkable. The common carotid arteries demonstrate no   significant stenosis. Mild calcific atherosclerosis of the proximal right   internal carotid artery without significant stenosis. There is no evidence of   significant stenosis in the cervical left internal carotid artery. There is a right dominant vertebrobasilar arterial system. The cervical   vertebral arteries are normal in course, size and contour without significant   stenosis. Visualized soft tissues of the neck are unremarkable. Visualized lung apices are   clear. No acute fracture or aggressive osseous lesion. 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 09/08/22 0226  CT CODE NEURO HEAD WO CONTRAST Final result    Impression:  1. No evidence of acute intracranial abnormality. Narrative:  EXAM:  CT CODE NEURO HEAD WO CONTRAST       INDICATION:   Code Stroke       COMPARISON: CT head 4/21/2018. TECHNIQUE: Unenhanced CT of the head was performed using 5 mm images. Brain and   bone windows were generated.   CT dose reduction was achieved through use of a   standardized protocol tailored for this examination and automatic exposure   control for dose modulation. FINDINGS:   The ventricles are normal in size and position. Scattered periventricular and   deep white matter hypodensities, consistent with mild chronic microangiopathic   ischemic changes. Basilar cisterns are patent. No midline shift. There is no   evidence of acute infarct, hemorrhage, or extraaxial fluid collection. The paranasal sinuses, mastoid air cells, and middle ears are clear. The orbital   contents are within normal limits with bilateral lens implants. There are no   significant osseous or extracranial soft tissue lesions. CXR Results  (Last 48 hours)      None              Medical Decision Making     IMarcia MD am the first provider for this patient and am the attending of record for this patient encounter. I reviewed the vital signs, available nursing notes, past medical history, past surgical history, family history and social history. Vital Signs-Reviewed the patient's vital signs.   Patient Vitals for the past 12 hrs:   Temp Pulse Resp BP SpO2   09/08/22 0556 98.5 °F (36.9 °C) 89 22 (!) 142/79 95 %   09/08/22 0515 98.5 °F (36.9 °C) 89 22 (!) 149/79 96 %   09/08/22 0504 -- 89 25 (!) 150/81 96 %   09/08/22 0454 -- 85 23 -- 95 %   09/08/22 0449 -- 86 19 -- 95 %   09/08/22 0439 -- 88 25 (!) 148/86 97 %   09/08/22 0436 -- 86 20 -- 96 %   09/08/22 0431 -- 82 24 (!) 144/73 95 %   09/08/22 0430 98.2 °F (36.8 °C) 82 28 -- 96 %   09/08/22 0428 -- 82 30 -- 96 %   09/08/22 0416 -- 84 22 -- 95 %   09/08/22 0413 -- 88 25 (!) 145/84 95 %   09/08/22 0403 -- 87 19 -- 95 %   09/08/22 0401 -- 85 22 (!) 159/76 96 %   09/08/22 0358 -- 85 19 -- 95 %   09/08/22 0348 -- 87 20 (!) 156/59 95 %   09/08/22 0338 -- 85 17 -- 93 %   09/08/22 0328 -- 93 26 (!) 175/87 --   09/08/22 0318 -- 88 17 (!) 147/94 98 %   09/08/22 0308 -- 83 13 (!) 165/90 94 % 09/08/22 0258 -- 93 21 (!) 158/82 96 %   09/08/22 0219 -- 89 19 (!) 156/92 95 %         Records Reviewed: Nursing Notes and Old Medical Records    Provider Notes (Medical Decision Making):   53-year-old male presenting with right arm numbness/weakness as well as aphasia. Based on unclear history at first patient was made a level 1 stroke alert where he was taken directly to CT scan, CT head, CT angiogram with no acute findings. After further discussion, uncertain as to whether patient symptoms completely resolved, also unclear initially whether or not he was still taking Xarelto so patient was deemed to not be a tPA candidate. Discussed with teleneurology who also agreed that he would not be a tPA candidate at this time. He was evaluated in conjunction with teleneurology, concern for possible stroke/TIA. Recommended admission for MRI and stroke work-up. ED Course:   Initial assessment performed. The patients presenting problems have been discussed, and they are in agreement with the care plan formulated and outlined with them. I have encouraged them to ask questions as they arise throughout their visit. Medications   acetaminophen (TYLENOL) tablet 650 mg (has no administration in time range)   iopamidoL (ISOVUE-370) 76 % injection 100 mL (100 mL IntraVENous Given 9/8/22 0241)   ondansetron (ZOFRAN) injection 4 mg (4 mg IntraVENous Given 9/8/22 0325)   aspirin tablet 325 mg (325 mg Oral Given 9/8/22 0431)         PROGRESS:  The patient has been re-evaluated and sx have improved. Now speaking fluently, following commands. Reviewed available results with patient and have counseled them on diagnosis and care plan. They have expressed understanding, and all their questions have been answered. They agree with plan for admission. CONSULT:  Deleta Dandy, MD spoke with the hospitalist.  Discussed HPI and PE, available diagnostic tests and clinical findings.  He is in agreement with care plans as outlined and will evaluate for admission     Admit Note  Patient is being admitted to the hospitalist.  The results of their tests and reasons for their admission have been discussed with them and/or available family. They convey agreement and understanding for the need to be admitted and for their admission diagnosis. Consultation has been made with the inpatient physician specialist for hospitalization. Critical Care Note      IMPENDING DETERIORATION -CNS  ASSOCIATED RISK FACTORS - CNS Decompensation  MANAGEMENT- Bedside Assessment and Supervision of Care  INTERPRETATION -  CT Scan, ECG, and Blood Pressure  INTERVENTIONS - Neurologic interventions- stroke alert  CASE REVIEW - neurology  TREATMENT RESPONSE -Improved  PERFORMED BY - Self    NOTES   :  I have provided a total of 40 minutes of critical time not including time spent on separately documented procedures. The reason for providing this level of medical care for this critically ill patient was due to a critical illness that impaired one or more vital organ systems such that there was a high probability of imminent or life threatening deterioration in the patients condition. This care involved high complexity decision making to assess, manipulate, and support vital system functions,  lab review, consultations with specialist, family decision- making, bedside attention and documentation. During this entire length of time I was immediately available to the patient      Disposition:  admission    PLAN:  1. Admit     Diagnosis     Clinical Impression:   1. Aphasia        Please note that this dictation was completed with Dragon, computer voice recognition software. Quite often unanticipated grammatical, syntax, homophones, and other interpretive errors are inadvertently transcribed by the computer software. Please disregard these errors. Additionally, please excuse any errors that have escaped final proofreading.

## 2022-09-08 NOTE — PROGRESS NOTES
Spiritual Care Assessment/Progress Note  Lukas Roach      NAME: Elisa Pulliam      MRN: [id number]  AGE: 80 y.o.  SEX: male  Latter-day Affiliation: Islam   Language: English     9/8/2022     Total Time (in minutes): 11     Spiritual Assessment begun in [facility] through conversation with:         [x]Patient        [] Family    [] Friend(s)        Reason for Consult: Initial/Spiritual assessment, patient floor     Spiritual beliefs: (Please include comment if needed)     [x] Identifies with a ten tradition:         [] Supported by a ten community:            [] Claims no spiritual orientation:           [] Seeking spiritual identity:                [] Adheres to an individual form of spirituality:           [] Not able to assess:                           Identified resources for coping:      [] Prayer                               [] Music                  [] Guided Imagery     [x] Family/friends                 [] Pet visits     [] Devotional reading                         [] Unknown     [] Other:                                               Interventions offered during this visit: (See comments for more details)    Patient Interventions: Affirmation of emotions/emotional suffering, Affirmation of ten, Iconic (affirming the presence of God/Higher Power), Initial/Spiritual assessment, patient floor, Prayer (assurance of)           Plan of Care:     [x] Support spiritual and/or cultural needs    [] Support AMD and/or advance care planning process      [] Support grieving process   [] Coordinate Rites and/or Rituals    [] Coordination with community clergy   [] No spiritual needs identified at this time   [] Detailed Plan of Care below (See Comments)  [] Make referral to Music Therapy  [] Make referral to Pet Therapy     [] Make referral to Addiction services  [] Make referral to [facility]  [] Make referral to Spiritual Care Partner  [] No future visits requested        [] Contact Spiritual Care for further referrals     Comments: Initial spiritual assessment in    Provided empathic listening and spiritual support.  Advised of  Availability    [address] [place] [address]

## 2022-09-08 NOTE — ED NOTES
Pt's family member is unsure of Pt's home medication list. Will bring tomorrow.  Unable to complete med rec at this time

## 2022-09-08 NOTE — PROGRESS NOTES
Pt arrived to floor at 0530. NIH scale was performed with ED nurse Brice Bence RN. Pt's wife Sunny Beaver provided all information. Her emergency contact info is at . Pt's wife states she make all healthcare decisions for pt and is legally  to patient. Pt's wife will be contacting the cardiologist who performed his implantable device which was monitored when he had atrial fibrillation. No other metal is in his body. She will be verifying with the cardiologist that this device is safe for his MRI scheduled today. She is also getting his medication list from his family physician. This will be brought later today.

## 2022-09-08 NOTE — PROGRESS NOTES
Care Management Interventions  PCP Verified by CM: Yes (Dr. Scotty Rios MD)  Last Visit to PCP: 07/15/22  Palliative Care Criteria Met (RRAT>21 & CHF Dx)?: No  Mode of Transport at Discharge: Other (see comment) (POV/Spouse)  Transition of Care Consult (CM Consult): Discharge Planning  MyChart Signup: No  Discharge Durable Medical Equipment: No  Physical Therapy Consult: Yes  Occupational Therapy Consult: Yes  Speech Therapy Consult: Yes  Support Systems: Spouse/Significant Other  Confirm Follow Up Transport: Self   Resource Information Provided?: No  Discharge Location  Patient Expects to be Discharged to[de-identified] Home /may want home health    Mr. Chu Tapia was admitted with Aphasia today 9/8/22 under Inpatient status. The IM notice was received through Patient Access. Mr. Chu Tapia was getting an MRI, but Mrs. Chu Tapia was in the room for Care Management Intake. The Amadou live in [facility]. Mr. Chu Tapia has been able to manage his ADL's, does not use any DME, is able to drive. SLP, OT, and PT have been ordered. Plan is that the [address] [place] [address] will discuss whether or not they want home health services. I gave Mrs. Celeste brochures for the three home health agencies serving the area. Mrs. Chu Tapia said there was no Advance Directive. I gave her the blank document with the explanatory brochure Your Right to Decide. The Care Management introductory letter with contact information was given. Advance Care Planning     General Advance Care Planning (ACP) Conversation      Date of Conversation: 9/8/2022  Conducted with: Patient with Decision Making Capacity    Healthcare Decision Maker:   No healthcare decision makers have been documented.    Click here to complete [address] including selection of the Healthcare Decision Maker Relationship (ie \"Primary\")      Content/Action Overview:   Has NO ACP documents/care preferences - information provided, considering goals and options  Reviewed DNR/DNI and patient elects Full Code (Attempt Resuscitation)      Length of Voluntary ACP Conversation in minutes:  <16 minutes (Non-Billable)    Lavelle Gilford         Reason for Admission:  Aphasia                     RUR Score:   9% LOW                  Plan for utilizing home health:   TBD       PCP: First and Last name:  Corie Lozada MD. Dr. Shaka Amezcua MD     Name of Practice: [facility]   Are you a current patient: Yes/No: YES   Approximate date of last visit: 07/15/22   Can you participate in a virtual visit with your PCP: YES                    Current Advanced Directive/Advance Care Plan: Full Code      Healthcare Decision Maker:   Click here to complete [address] including selection of the Healthcare Decision Maker Relationship (ie \"Primary\")                  Transition of Care Plan:  HOME.  May want [address]

## 2022-09-08 NOTE — PROGRESS NOTES
Problem: Mobility Impaired (Adult and Pediatric)  Goal: *Acute Goals and Plan of Care (Insert Text)  Description:   FUNCTIONAL STATUS PRIOR TO ADMISSION: Patient was independent and active without use of DME.    HOME SUPPORT PRIOR TO ADMISSION: The patient lived with wife but did not require assist.    Physical Therapy Goals  Initiated 9/8/2022  1. Patient will move from supine to sit and sit to supine  in bed with modified independence within 7 day(s). 2.  Patient will transfer from bed to chair and chair to bed with supervision/set-up using the least restrictive device within 7 day(s). 3.  Patient will perform sit to stand with independence within 7 day(s). 4.  Patient will ambulate with minimal assistance/contact guard assist for 150 feet with the least restrictive device within 7 day(s). 5.  Patient will ascend/descend 15 stairs with 1 handrail(s) with minimal assistance/contact guard assist within 7 day(s). 6.  Patient will improve Littlejohn Balance score by 7 points within 7 days. Outcome: Not Met  Note:   PHYSICAL THERAPY EVALUATION- NEURO POPULATION  Patient: Devi Barbosa (84 y.o. male)  Date: 9/8/2022  Primary Diagnosis: Aphasia [R47.01]       Precautions:   Fall      ASSESSMENT  Based on the objective data described below, the patient presents with impaired balance, decreased coordination, decreased functional mobility skills, and expressive aphasia. Patient supine in bed upon arrival, with OT present. Patient has some difficulty following commands but great difficulty in answering questions, often becoming frustrated. Able to come to EOB with min assist x 1. Good sitting balance noted. Sit to stand with CGA. Patient ambulated short distance in room with rolling walker and min assist with wide ADITI noted with shuffling steps. Patient is quite unsteady ambulating, requiring assistance to maintain balance.  Ambulated into bathroom with OT before sitting up in chair at end of session with wife present. Per wife, patient has had episodes similar to this before but usually seem to pass quickly. Patient would benefit from continued PT at discharge. Depending on progress, patient may benefit from inpatient rehab vs. HHPT. Current Level of Function Impacting Discharge (mobility/balance): min assist    Functional Outcome Measure: The patient scored Total: 7/56 on the [facility] Assessment which is indicative of high fall risk. Other factors to consider for discharge: safety awareness, fall risk     Patient will benefit from skilled therapy intervention to address the above noted impairments. PLAN :  Recommendations and Planned Interventions: bed mobility training, transfer training, gait training, therapeutic exercises, patient and family training/education, and therapeutic activities      Frequency/Duration: Patient will be followed by physical therapy:  4 times a week to address goals. Recommendation for discharge: (in order for the patient to meet his/her long term goals)  Rehab (to be determined as patient progresses)    This discharge recommendation:  Has been made in collaboration with the attending provider and/or case management    IF patient discharges home will need the following DME: rolling walker         SUBJECTIVE:   Patient has difficulty answering questions.     OBJECTIVE DATA SUMMARY:   HISTORY:    Past Medical History:   Diagnosis Date    Atrial fibrillation ([facility] [address]     CAD (coronary artery disease)     Cancer ([facility] [address]     Skin- basal and squamous     Congestive heart failure ([facility] [address]     Glaucoma     Hypertension     Hypothyroidism     Stroke [facility])      Past Surgical History:   Procedure Laterality Date    CATH PERICARDIOCENTESIS  4/22/2018         HX APPENDECTOMY  1947    HX CATARACT REMOVAL  2009 x 2    HX THYROIDECTOMY  1950 Partial,    2003 Full    [place] CARDIAC SURG PROCEDURE UNLIST  04/09/2018    cardiac ablation, implant permanent cardiac monitor-[employer]       Personal factors and/or comorbidities impacting plan of care: supportive wife at home    [address]: Private residence  # Steps to Enter: 4  Rails to Enter: Yes  Hand Rails : Bilateral  One/Two Story Residence: Two story  # of Interior Steps: 13  Interior Rails: Left  Living Alone: No  Support Systems: Spouse/Significant Other  Patient Expects to be Discharged to[de-identified] Home  Current DME Used/Available at Home: Cane, straight  Tub or Shower Type: Tub/Shower combination    EXAMINATION/PRESENTATION/DECISION MAKING:   Critical Behavior:  Neurologic State: Alert, Confused  Orientation Level: Disoriented to place, Disoriented to time, Disoriented to situation, Oriented to person (responses likely impacted by apashia)  Cognition: Decreased command following  Safety/Judgement: Awareness of environment, Decreased insight into deficits, Decreased awareness of need for safety  Hearing:   Auditory  Auditory Impairment: None  Skin:  intact  Edema: none noted  Range Of Motion:  AROM: Within functional limits           PROM: Within functional limits           Strength:    Strength: Generally decreased, functional                    Tone & Sensation:   Tone: Normal              Sensation:  (unable to assess)               Coordination:  Coordination: Generally decreased, functional  Vision:      Functional Mobility:  Bed Mobility:     Supine to Sit: Minimum assistance     Scooting: Contact guard assistance  Transfers:  Sit to Stand: Contact guard assistance  Stand to Sit: Contact guard assistance        Bed to Chair: Contact guard assistance;Minimum assistance              Balance:   Sitting: Intact  Standing: Impaired  Standing - Static: Fair  Standing - Dynamic : Fair;Constant support  Ambulation/Gait Training:  Distance (ft): 20 Feet (ft)  Assistive Device: Gait belt;Walker, rolling  Ambulation - Level of Assistance: Minimal assistance        Gait Abnormalities: Decreased step clearance  Right Side Weight Bearing: Full  Left Side Weight Bearing: Full  Base of Support: Widened     Speed/Susannah: Pace decreased (<100 feet/min); Shuffled  Step Length: Right shortened;Left shortened                     Functional Measure  Littlejohn Balance Test:    Sitting to Standin  Standing Unsupported: 0  Sitting with Back Unsupported: 3  Standing to Sittin  Transfers: 1  Standing Unsupported with Eyes Closed: 0  Standing Unsupported with Feet Together: 0  Reach Forward with Outstretched Arm: 0   Object: 0  Turn to Look Over Shoulders: 1  Turn 360 Degrees: 0  Alternate Foot on Step/Stool: 0  Standing Unsupported One Foot in Front: 0  Stand on One Le  Total: 7         56=Maximum possible score;   0-20=High fall risk  21-40=Moderate fall risk   41-56=Low fall risk        Physical Therapy Evaluation Charge Determination   History Examination Presentation Decision-Making   MEDIUM  Complexity : 1-2 comorbidities / personal factors will impact the outcome/ POC  MEDIUM Complexity : 3 Standardized tests and measures addressing body structure, function, activity limitation and / or participation in recreation  MEDIUM Complexity : Evolving with changing characteristics  MEDIUM Complexity : FOTO score of 26-74      Based on the above components, the patient evaluation is determined to be of the following complexity level: MEDIUM    Pain Rating:  None reported    Activity Tolerance:   Good      After treatment patient left in no apparent distress:   Sitting in chair, Call bell within reach, Bed / chair alarm activated, and Caregiver / family present    COMMUNICATION/EDUCATION:   The patients plan of care was discussed with: Occupational therapist, Registered nurse, and Case management. Patient was educated regarding his deficit(s) of balance, aphasia as this relates to his diagnosis of ?TIA. He demonstrated Fair understanding as evidenced by difficulty with speech.         Fall prevention education was provided and the patient/caregiver indicated understanding. and Patient/family agree to work toward stated goals and plan of care.     Thank you for this referral.  Mikhail Roldan, PT   Time Calculation: 28 mins

## 2022-09-08 NOTE — PROGRESS NOTES
Remote Hospitalist ED sign out/admission acceptance    Chief complaint: Transient right arm weakness and aphagia      Reason For admission: TIA/CVA work up      Admission [address]      ED Physician giving sign out: Dr. Savita Shaikh      Patient Vitals for the past 12 hrs:   Pulse Resp BP SpO2   09/08/22 0413 88 25 -- 95 %   09/08/22 0403 87 19 -- 95 %   09/08/22 0401 85 22 (!) 159/76 96 %   09/08/22 0358 85 19 -- 95 %   09/08/22 0348 87 20 (!) 156/59 95 %   09/08/22 0338 85 17 -- 93 %   09/08/22 0328 93 26 (!) 175/87 --   09/08/22 0318 88 17 (!) 147/94 98 %   09/08/22 0308 83 13 (!) 165/90 94 %   09/08/22 0258 93 21 (!) 158/82 96 %   09/08/22 0219 89 19 (!) 156/92 95 %         Recent Results (from the past 24 hour(s))   GLUCOSE, POC    Collection Time: 09/08/22  2:17 AM   Result Value Ref Range    Glucose (POC) 126 (H) 65 - 117 mg/dL    Performed by Toribio Bender    CBC WITH AUTOMATED DIFF    Collection Time: 09/08/22  2:45 AM   Result Value Ref Range    WBC 9.4 4.1 - 11.1 K/uL    RBC 4.81 4.10 - 5.70 M/uL    HGB 13.7 12.1 - 17.0 g/dL    HCT 40.7 36.6 - 50.3 %    MCV 84.6 80.0 - 99.0 FL    MCH 28.5 26.0 - 34.0 PG    MCHC 33.7 30.0 - 36.5 g/dL    RDW 13.1 11.5 - 14.5 %    PLATELET 671 904 - 404 K/uL    MPV 9.8 8.9 - 12.9 FL    NRBC 0.0 0  WBC    ABSOLUTE NRBC 0.00 0.00 - 0.01 K/uL    NEUTROPHILS 80 (H) 32 - 75 %    LYMPHOCYTES 10 (L) 12 - 49 %    MONOCYTES 8 5 - 13 %    EOSINOPHILS 1 0 - 7 %    BASOPHILS 0 0 - 1 %    IMMATURE GRANULOCYTES 0 0.0 - 0.5 %    ABS. NEUTROPHILS 7.5 1.8 - 8.0 K/UL    ABS. LYMPHOCYTES 1.0 0.8 - 3.5 K/UL    ABS. MONOCYTES 0.8 0.0 - 1.0 K/UL    ABS. EOSINOPHILS 0.1 0.0 - 0.4 K/UL    ABS. BASOPHILS 0.0 0.0 - 0.1 K/UL    ABS. IMM.  GRANS. 0.0 0.00 - 0.04 K/UL    DF AUTOMATED     METABOLIC PANEL, COMPREHENSIVE    Collection Time: 09/08/22  2:45 AM   Result Value Ref Range    Sodium 131 (L) 136 - 145 mmol/L    Potassium 3.9 3.5 - 5.1 mmol/L    Chloride 96 (L) 97 - 108 mmol/L CO2 32 21 - 32 mmol/L    Anion gap 3 (L) 5 - 15 mmol/L    Glucose 121 (H) 65 - 100 mg/dL    BUN 9 6 - 20 MG/DL    Creatinine 0.67 (L) 0.70 - 1.30 MG/DL    BUN/Creatinine ratio 13 12 - 20      GFR est AA >60 >60 ml/min/1.73m2    GFR est non-AA >60 >60 ml/min/1.73m2    Calcium 8.6 8.5 - 10.1 MG/DL    Bilirubin, total 0.4 0.2 - 1.0 MG/DL    ALT (SGPT) 19 12 - 78 U/L    AST (SGOT) 18 15 - 37 U/L    Alk.  phosphatase 107 45 - 117 U/L    Protein, total 6.6 6.4 - 8.2 g/dL    Albumin 3.6 3.5 - 5.0 g/dL    Globulin 3.0 2.0 - 4.0 g/dL    A-G Ratio 1.2 1.1 - 2.2     PROTHROMBIN TIME + INR    Collection Time: 09/08/22  2:45 AM   Result Value Ref Range    INR 1.1 0.9 - 1.1      Prothrombin time 10.9 9.0 - 11.1 sec   MAGNESIUM    Collection Time: 09/08/22  2:45 AM   Result Value Ref Range    Magnesium 1.9 1.6 - 2.4 mg/dL   TROPONIN-HIGH SENSITIVITY    Collection Time: 09/08/22  2:45 AM   Result Value Ref Range    Troponin-High Sensitivity 5 0 - 76 ng/L   URINALYSIS W/ REFLEX CULTURE    Collection Time: 09/08/22  2:55 AM    Specimen: Urine   Result Value Ref Range    Color YELLOW/STRAW      Appearance CLEAR CLEAR      Specific gravity 1.010 1.003 - 1.030      pH (UA) 8.0 5.0 - 8.0      Protein Negative NEG mg/dL    Glucose Negative NEG mg/dL    Ketone Negative NEG mg/dL    Bilirubin Negative NEG      Blood TRACE (A) NEG      Urobilinogen 0.2 0.2 - 1.0 EU/dL    Nitrites Negative NEG      Leukocyte Esterase Negative NEG      WBC 0-4 0 - 4 /hpf    RBC 5-10 0 - 5 /hpf    Epithelial cells FEW FEW /lpf    Bacteria Negative NEG /hpf    UA:UC IF INDICATED CULTURE NOT INDICATED BY UA RESULT CNI     EKG, 12 LEAD, INITIAL    Collection Time: 09/08/22  2:58 AM   Result Value Ref Range    Ventricular Rate 85 BPM    Atrial Rate 85 BPM    P-R Interval 212 ms    QRS Duration 94 ms    Q-T Interval 386 ms    QTC Calculation (Bezet) 459 ms    Calculated P Axis 64 degrees    Calculated R Axis 24 degrees    Calculated T Axis 71 degrees    Diagnosis Sinus rhythm with 1st degree AV block  Possible Left atrial enlargement  Borderline ECG  When compared with ECG of 24-JUL-2020 15:51,  premature ventricular complexes are no longer present  ME interval has increased           CTA CODE NEURO HEAD AND NECK W CONT   Final Result   CTA Head:   1. No evidence of significant stenosis or aneurysm. CTA Neck:   1. No evidence of significant stenosis. CT CODE NEURO HEAD WO CONTRAST   Final Result   1. No evidence of acute intracranial abnormality. No results found for this or any previous visit.

## 2022-09-08 NOTE — ED NOTES
Pt arrived POV with spouse with c/o aphasia that started 01:00. Pt went to bed at 22:30. Per spouse Pt had changes in mobility and weakness starting at 16:30. Pt unable to answer questions at this time. Code S called at this time.

## 2022-09-08 NOTE — Clinical Note
Status[de-identified] INPATIENT [101]   Type of Bed: Telemetry [19]   Cardiac Monitoring Required?: Yes   Inpatient Hospitalization Certified Necessary for the Following Reasons: 3. Patient receiving treatment that can only be provided in an inpatient setting (further clarification in H&P documentation)   Admitting Diagnosis: Aphasia Deneb.Alias. 3. ICD-9-CM]   Admitting Physician: Gary Ying [[id number]]   Attending Physician: Juno Saldana   Estimated Length of Stay: < 96 Hours   Discharge Plan[de-identified] Home with Office Follow-up

## 2022-09-08 NOTE — PROGRESS NOTES
Problem: Self Care Deficits Care Plan (Adult)  Goal: *Acute Goals and Plan of Care (Insert Text)  Description: FUNCTIONAL STATUS PRIOR TO ADMISSION: Patient was independent and active without use of DME.    HOME SUPPORT: The patient lived with his wife but did not require assist.    Occupational Therapy Goals  Initiated 9/8/2022  1. Patient will perform grooming standing at the sink with modified independence within 7 day(s). 2.  Patient will perform bathing with supervision/SBA within 7 day(s). 3.  Patient will perform lower body dressing with modified independence within 7 day(s). 4.  Patient will perform toilet transfers with modified independence within 7 day(s). 5.  Patient will perform all aspects of toileting with modified independence within 7 day(s). OCCUPATIONAL THERAPY EVALUATION  Patient: Jose Suggs (88 y.o. male)  Date: 9/8/2022  Primary Diagnosis: Aphasia [R47.01]       Precautions:  Fall    ASSESSMENT  Based on the objective data described below, the patient presents with deficits in self-care, primarily due to decreased command following, decreased insight into deficits, decreased activity tolerance, impaired balance, and general weakness. He is noted to have difficulty with expressive language, and is inconsistent with verbal responses to questions. He was unable to state his date of birth or his full name. He did better with choices; plan to discuss with speech therapy. Patient requires overall Min A to CGA for functional mobility during ADL. He did have one significant loss of balance, requiring assistance to correct, and is overall unsteady. Patient is functioning well below his baseline level and will benefit from skilled OT treatment to maximize independence and safety in ADL. Functional Outcome Measure:   The patient scored 45/100 on the Barthel Index    Other factors to consider for discharge: will need 24 hour supervision     Patient will benefit from skilled therapy intervention to address the above noted impairments. PLAN :  Recommendations and Planned Interventions: self care training, functional mobility training, therapeutic exercise, balance training, therapeutic activities, cognitive retraining, endurance activities, patient education, home safety training, and family training/education    Frequency/Duration: Patient will be followed by occupational therapy 4 times a week to address goals. Recommendation for discharge: (in order for the patient to meet his/her long term goals)  Rehab    This discharge recommendation:  A follow-up discussion with the attending provider and/or case management is planned    IF patient discharges home will need the following DME: TBD - may need from RW, but will defer to physical therapy       SUBJECTIVE:   Patient stated This really hurts.  (L side of torso; mentioned frequently this session.  His wife attributes it to acute diverticulitis)    OBJECTIVE DATA SUMMARY:   HISTORY:   Past Medical History:   Diagnosis Date    Atrial fibrillation (HCC)     CAD (coronary artery disease)     Cancer ([facility] [address]     Skin- basal and squamous     Congestive heart failure ([facility] [address]     Glaucoma     Hypertension     Hypothyroidism     Stroke [facility])      Past Surgical History:   Procedure Laterality Date    CATH PERICARDIOCENTESIS  4/22/2018         HX APPENDECTOMY  1947    HX CATARACT REMOVAL  2009 x 2    HX THYROIDECTOMY  1950 Partial,    2003 Full    [place] CARDIAC SURG PROCEDURE UNLIST  04/09/2018    cardiac ablation, implant permanent cardiac monitor-[employer]       Expanded or extensive additional review of patient history:     Home Situation  Home Environment: Private residence  # Steps to Enter: 4  Rails to Enter: Yes  Hand Rails : Bilateral  One/Two Story Residence: Two story  # of Interior Steps: 15  Interior Rails: Left  Living Alone: No  Support Systems: Spouse/Significant Other  Patient Expects to be Discharged to[de-identified] Home  Current DME Used/Available at Home: Cane, straight  Tub or Shower Type: Tub/Shower combination    Hand dominance: Right    EXAMINATION OF PERFORMANCE DEFICITS:  Cognitive/Behavioral Status:  Neurologic State: Alert;Confused  Orientation Level: Disoriented to place; Disoriented to time;Disoriented to situation;Oriented to person (responses likely impacted by apashia)  Cognition: Decreased command following     Perseveration: Perseverates during conversation;Perseverates during ADLS  Safety/Judgement: Awareness of environment;Decreased insight into deficits; Decreased awareness of need for safety    Hearing: Auditory  Auditory Impairment: None    Vision/Perceptual:    Difficult to formally assess due to deficits in command following and impaired visual fixation. Range of Motion:  AROM: Within functional limits                         Strength:  Strength: Generally decreased, functional                Coordination:     Fine Motor Skills-Upper: Left Intact; Right Intact    Gross Motor Skills-Upper: Left Intact; Right Intact    Tone & Sensation:  Tone: Normal                         Balance:  Sitting: Intact  Standing: Impaired  Standing - Static: Fair  Standing - Dynamic : Fair;Constant support    Functional Mobility and Transfers for ADLs:  Bed Mobility:  Supine to Sit: Minimum assistance  Scooting: Contact guard assistance    Transfers:  Sit to Stand: Contact guard assistance  Stand to Sit: Contact guard assistance  Bed to Chair: Contact guard assistance;Minimum assistance  Bathroom Mobility: Minimum assistance  Toilet Transfer : Contact guard assistance    ADL Assessment:  Feeding: Setup;Supervision    Oral Facial Hygiene/Grooming: Minimum assistance    Bathing: Minimum assistance    Upper Body Dressing: Supervision    Lower Body Dressing:  Moderate assistance;Minimum assistance    Toileting: Minimum assistance                  ADL Intervention and task modifications:  Patient instructed and indicated understanding the benefits of maintaining activity tolerance, functional mobility, and independence with self care tasks during acute stay  to ensure safe return home and to baseline. Encouraged patient to increase frequency and duration OOB, be out of bed for all meals, perform daily ADLs (as approved by RN/MD regarding bathing etc), and performing functional mobility to/from bathroom. Emphasized the importance of having staff with him ANY time he stands. Patient and wife indicate understanding, but placed an alarm in the chair for safety. Pt educated on safe transfer techniques, with specific emphasis on proper hand placement to push up from seated surface rather than attempt to pull self up, fully positioning self in-front of desired seated location, feeling chair on back of legs and reaching back with 1-2 UE to slowly lower self to seated position. Patient is unsteady and a high fall risk. May benefit from the use of a RW. Discussed general home safety and fall prevention. Initiated ADL training. Cognitive Retraining  Safety/Judgement: Awareness of environment;Decreased insight into deficits; Decreased awareness of need for safety      Functional Measure:    Barthel Index:  Bathin  Bladder: 5  Bowels: 10  Groomin  Dressin  Feedin  Mobility: 0  Stairs: 0  Toilet Use: 5  Transfer (Bed to Chair and Back): 10  Total: 45/100      The Barthel ADL Index: Guidelines  1. The index should be used as a record of what a patient does, not as a record of what a patient could do. 2. The main aim is to establish degree of independence from any help, physical or verbal, however minor and for whatever reason. 3. The need for supervision renders the patient not independent. 4. A patient's performance should be established using the best available evidence. Asking the patient, friends/relatives and nurses are the usual sources, but direct observation and common sense are also important.  However direct testing is not needed. 5. Usually the patient's performance over the preceding 24-48 hours is important, but occasionally longer periods will be relevant. 6. Middle categories imply that the patient supplies over 50 per cent of the effort. 7. Use of aids to be independent is allowed. Score Interpretation (from [address])    Independent   60-79 Minimally independent   40-59 Partially dependent   20-39 Very dependent   <20 Totally dependent     -César Dove., Barthel, DPaulaW. (1965). Functional evaluation: the Barthel Index. [address] ([address] (1997). The Barthel activities of daily living index: self-reporting versus actual performance in the old (> or = 75 years). Journal [address] 45(7), 14 [facility], J.J.M.F, Jamshid Quick., Raymond Arellano. (1999). Measuring the change in disability after inpatient rehabilitation; comparison of the responsiveness of the Barthel Index and Functional [place] Measure. Journal of Neurology, Neurosurgery, and Psychiatry, 66(4), 491-716. Ml Short N.J.A, ONEYDA Rosado, & Chuck Rapp, M.A. (2004) Assessment of post-stroke quality of life in cost-effectiveness studies: The usefulness of the Barthel Index and the EuroQoL-5D.  Quality of Life Research, 15, 340-31        Occupational Therapy Evaluation Charge Determination   History Examination Decision-Making   LOW Complexity : Brief history review  HIGH Complexity : 5 or more performance deficits relating to physical, cognitive , or psychosocial skils that result in activity limitations and / or participation restrictions LOW Complexity : No comorbidities that affect functional and no verbal or physical assistance needed to complete eval tasks       Based on the above components, the patient evaluation is determined to be of the following complexity level: LOW   Pain Rating:  Unable to rate, but as stated above, frequently c/o in left torso. Discussed with RN. Activity Tolerance:   Fair    After treatment patient left in no apparent distress:    Sitting in chair, Call bell within reach, Bed / chair alarm activated, and Caregiver / family present    COMMUNICATION/EDUCATION:   The patients plan of care was discussed with: Physical therapist and Registered nurse. Patient/family agree to work toward stated goals and plan of care. This patients plan of care is appropriate for delegation to [facility].     Thank you for this referral.  Meseret Birch, OTR/L  Time Calculation: 54 mins

## 2022-09-08 NOTE — PROGRESS NOTES
TELEHEALTH SPEECH LANGUAGE PATHOLOGY EVALUATION  Patient: Paxton Area (66 y.o. male)  Date: 9/8/2022  Primary Diagnosis: Aphasia [R47.01]       Precautions: [address] was evaluated through a synchronous (real-time) audio-video encounter. This virtual visit was conducted with the patient's (and/or legal guardian's) consent. The patient was located at: [facility]  The clinician was located at: [address] [place] [address] Sary :  This SLP evaluation was completed via telehealth. Based on the objective data described below, the patient presents with moderate to moderately severe expressive/receptive language deficits. Note patient passed the [employer] screen and Regular/Thin Liquid diet was ordered. MRI showed \"No acute intracranial abnormality. No evidence of ischemia. 2. Subcortical/periventricular white matter signal abnormalities likely reflect chronic age-related small vessel ischemic disease. \" Patient demonstrated difficulty with 2-step command following, automatic speech, confrontation, responsive and divergent naming tasks, as well as with short-term recall. Patient is a poor historian and no family present at bedside to determine baseline. Per discussion with MD, patient is well below his baseline language function despite previous history of stroke. Patient intermittently demonstrates insight into deficits, but unable to independently self-correct. Patient observed with halting speech with word retrieval difficulties at the conversation level as well as with phonemic and semantic paraphasias. Patient will benefit from skilled intervention to address the above impairments. Patients rehabilitation potential is considered to be guarded given unknown etiology of deficits.      PLAN :  Recommendations and Planned Interventions:  -- Spoke with MD re: SLP initial evaluation  -- Patient to benefit from intensive SLP treatment acutely and likely at next LOC  -- Consider further neuropysch work-up    Frequency/Duration: Patient will be followed by speech-language pathology PRN to address goals. Discharge Recommendations: None     SUBJECTIVE:   Patient repeatedly stated I just can't think of the word.     OBJECTIVE:     Past Medical History:   Diagnosis Date    Atrial fibrillation (HCC)     CAD (coronary artery disease)     Cancer (HCC)     Skin- basal and squamous     Congestive heart failure (HCC)     Glaucoma     Hypertension     Hypothyroidism     Stroke [facility])      Past Surgical History:   Procedure Laterality Date    CATH PERICARDIOCENTESIS  4/22/2018         HX APPENDECTOMY  1947    HX CATARACT REMOVAL  2009 x 2    HX THYROIDECTOMY  1950 Partial,    2003 Full    DC CARDIAC SURG PROCEDURE UNLIST  04/09/2018    cardiac ablation, implant permanent cardiac monitor-[employer]     Prior Level of Function/Home Situation:   Home Situation  Home Environment: Private residence  # Steps to Enter: 4  Rails to Enter: Yes  Hand Rails : Bilateral  One/Two Story Residence: Two story  # of Interior Steps: 13  Interior Rails: Left  Living Alone: No  Support Systems: Spouse/Significant Other  Patient Expects to be Discharged to[de-identified] Home  Current DME Used/Available at Home: Cane, straight  Tub or Shower Type: Tub/Shower combination    Mental Status:  Neurologic State: Alert  Orientation Level: Oriented to person, Other (Comment), Disoriented to time (Unable to name place, but answered correctly with yes/no questions)  Cognition: (P) Follows commands  Perception: Cues to attend left visual field  Perseveration: Perseverates during conversation  Safety/Judgement: Awareness of environment    Motor Speech:  Oral-Motor Structure/Motor Speech  Intelligibility: Impaired  Word Intelligibility (%): 100 %  Phrase Intelligibility (%): 100 %  Sentence Intelligibility (%): 100 %  Conversation Intelligibility (%): 90 %    Language Comprehension and Expression:  Auditory Comprehension  Auditory Impairment: Yes  Response to Basic Yes/No Questions (%): 100 %  Two-Step Basic Commands (%): 80 %  Effective Techniques: Repetition  Cueing type: Verbal  Cueing amount: Minimal  Verbal Expression  Primary Mode of Expression: Verbal  Initiation: No impairment  Automatic Speech Task: Impaired (comment)  Automatic speech task cueing type: Verbal  Automatic speech task cueing amount: Minimal  Naming: Impaired  Confrontation (%): 40 % independently  40% minimal cues  20% moderate cues  Divergent (%): 0 %  Responsive (%): 60 % independently  20% minimal cues  20% moderate cues  Sentence Completion: Impaired  Phrase level (%): 60 %  Speech Characteristics: Word retrieval  Effective Techniques: Word retrieval strategies  Overall Impairment: Moderate          Neuro-Linguistics:                    Memory: Impaired           Memory Exercises  Recall Message Parts: 3  Recall Message Time Delay: 5 minutes  Recall Message Accuracy (%): 67 %  Recall Message Cues: Minimal                   Voice:   WFL                                                NOMS: The NOMS functional outcome measure was used to quantify this patient's level of expressive language impairment. Based on the NOMS, the patient was determined to be at level 4 for spoken language expression. NOMS Spoken Language Expression:  Level 1 (CN): Verbalizations not meaningful to anyone. Level 2 (CM): Few attempts accurate/appropriate. Max cues to elicit automatic/imitative words/phrases. Level 3 (CL): Communication partner responsible for communication; Mod cues for words/phrases meaningful in context  Level 4 (CK): Initiate during simple, routine activities w/familiar partner. Mod cues to produce simple sentences  Level 5 (Larissa Shaylee): Initiates communication with familiar and unfamiliar partners. Min cues for complex sentences. Level 6 (CI): Communicates for most activities. Some limitations still present for vocational/social activities.   Rarely cued for complex info  Level 7 [facility]): Independent communication. KELSY. (2003). National Outcomes Measurement System (NOMS): Adult Speech-Language Pathology User's Guide. Pain:  Pain Scale 1: Numeric (0 - 10)  Pain Intensity 1: 0       After treatment:   Patient left in no apparent distress in bed, Call bell within reach, and Nursing notified    COMMUNICATION/EDUCATION:   The patient's plan of care including recommendations, planned interventions, and recommended diet changes were discussed with: Physician. Patient/family have participated as able in goal setting and plan of care. Patient/family agree to work toward stated goals and plan of care.     Thank you for this referral.  Ambar Carmona SLP  Time Calculation: 20 mins

## 2022-09-08 NOTE — PROGRESS NOTES
Physical Therapy  PT evaluation completed; full note to follow. Patient requiring min assist for most mobility and demonstrating unsteady gait. Patient will need further rehab at discharge.

## 2022-09-09 VITALS
OXYGEN SATURATION: 97 % | RESPIRATION RATE: 20 BRPM | DIASTOLIC BLOOD PRESSURE: 81 MMHG | SYSTOLIC BLOOD PRESSURE: 146 MMHG | HEART RATE: 73 BPM | BODY MASS INDEX: 21.25 KG/M2 | HEIGHT: 77 IN | WEIGHT: 180 LBS | TEMPERATURE: 98.3 F

## 2022-09-09 PROBLEM — H40.9 GLAUCOMA: Status: ACTIVE | Noted: 2022-09-09

## 2022-09-09 PROBLEM — H40.9 GLAUCOMA: Chronic | Status: ACTIVE | Noted: 2022-09-09

## 2022-09-09 PROBLEM — G81.91 HEMIPARESIS OF RIGHT DOMINANT SIDE (HCC): Status: ACTIVE | Noted: 2022-09-09

## 2022-09-09 LAB
ANION GAP SERPL CALC-SCNC: 6 MMOL/L (ref 5–15)
BUN SERPL-MCNC: 15 MG/DL (ref 6–20)
BUN/CREAT SERPL: 25 (ref 12–20)
CALCIUM SERPL-MCNC: 8.2 MG/DL (ref 8.5–10.1)
CHLORIDE SERPL-SCNC: 98 MMOL/L (ref 97–108)
CO2 SERPL-SCNC: 29 MMOL/L (ref 21–32)
CREAT SERPL-MCNC: 0.6 MG/DL (ref 0.7–1.3)
ECHO AO ROOT DIAM: 4 CM
ECHO AO ROOT INDEX: 1.87 CM/M2
ECHO AV PEAK GRADIENT: 4 MMHG
ECHO AV PEAK VELOCITY: 1 M/S
ECHO EST RA PRESSURE: 10 MMHG
ECHO LA DIAMETER INDEX: 1.5 CM/M2
ECHO LA DIAMETER: 3.2 CM
ECHO LA TO AORTIC ROOT RATIO: 0.8
ECHO LV E' SEPTAL VELOCITY: 5 CM/S
ECHO LV FRACTIONAL SHORTENING: 32 % (ref 28–44)
ECHO LV INTERNAL DIMENSION DIASTOLE INDEX: 1.45 CM/M2
ECHO LV INTERNAL DIMENSION DIASTOLIC: 3.1 CM (ref 4.2–5.9)
ECHO LV INTERNAL DIMENSION SYSTOLIC INDEX: 0.98 CM/M2
ECHO LV INTERNAL DIMENSION SYSTOLIC: 2.1 CM
ECHO LV IVSD: 1.2 CM (ref 0.6–1)
ECHO LV MASS 2D: 121.9 G (ref 88–224)
ECHO LV MASS INDEX 2D: 57 G/M2 (ref 49–115)
ECHO LV POSTERIOR WALL DIASTOLIC: 1.3 CM (ref 0.6–1)
ECHO LV RELATIVE WALL THICKNESS RATIO: 0.84
ECHO LVOT AREA: 5.3 CM2
ECHO LVOT DIAM: 2.6 CM
ECHO MV A VELOCITY: 0.53 M/S
ECHO MV E DECELERATION TIME (DT): 186.1 MS
ECHO MV E VELOCITY: 0.38 M/S
ECHO MV E/A RATIO: 0.72
ECHO MV E/E' SEPTAL: 7.6
ECHO PULMONARY ARTERY END DIASTOLIC PRESSURE: 3 MMHG
ECHO PV REGURGITANT MAX VELOCITY: 0.8 M/S
ECHO RIGHT VENTRICULAR SYSTOLIC PRESSURE (RVSP): 26 MMHG
ECHO TV REGURGITANT MAX VELOCITY: 1.98 M/S
ECHO TV REGURGITANT PEAK GRADIENT: 16 MMHG
GLUCOSE SERPL-MCNC: 108 MG/DL (ref 65–100)
POTASSIUM SERPL-SCNC: 3.3 MMOL/L (ref 3.5–5.1)
SODIUM SERPL-SCNC: 133 MMOL/L (ref 136–145)

## 2022-09-09 PROCEDURE — 97535 SELF CARE MNGMENT TRAINING: CPT

## 2022-09-09 PROCEDURE — 94760 N-INVAS EAR/PLS OXIMETRY 1: CPT

## 2022-09-09 PROCEDURE — 93306 TTE W/DOPPLER COMPLETE: CPT | Performed by: INTERNAL MEDICINE

## 2022-09-09 PROCEDURE — 80048 BASIC METABOLIC PNL TOTAL CA: CPT

## 2022-09-09 PROCEDURE — 36415 COLL VENOUS BLD VENIPUNCTURE: CPT

## 2022-09-09 PROCEDURE — 74011250636 HC RX REV CODE- 250/636: Performed by: STUDENT IN AN ORGANIZED HEALTH CARE EDUCATION/TRAINING PROGRAM

## 2022-09-09 PROCEDURE — 74011250637 HC RX REV CODE- 250/637: Performed by: STUDENT IN AN ORGANIZED HEALTH CARE EDUCATION/TRAINING PROGRAM

## 2022-09-09 PROCEDURE — 99285 EMERGENCY DEPT VISIT HI MDM: CPT

## 2022-09-09 RX ORDER — ATORVASTATIN CALCIUM 40 MG/1
40 TABLET, FILM COATED ORAL
Qty: 30 TABLET | Refills: 0 | Status: SHIPPED | OUTPATIENT
Start: 2022-09-09

## 2022-09-09 RX ORDER — CLOPIDOGREL BISULFATE 75 MG/1
75 TABLET ORAL DAILY
Qty: 21 TABLET | Refills: 0 | Status: SHIPPED | OUTPATIENT
Start: 2022-09-09 | End: 2022-09-30

## 2022-09-09 RX ADMIN — LEVOTHYROXINE SODIUM 75 MCG: 0.07 TABLET ORAL at 06:41

## 2022-09-09 RX ADMIN — TIMOLOL MALEATE 1 DROP: 5 SOLUTION OPHTHALMIC at 09:33

## 2022-09-09 RX ADMIN — SODIUM CHLORIDE 100 ML/HR: 9 INJECTION, SOLUTION INTRAVENOUS at 04:58

## 2022-09-09 RX ADMIN — ENOXAPARIN SODIUM 80 MG: 80 INJECTION SUBCUTANEOUS at 09:32

## 2022-09-09 RX ADMIN — ASPIRIN 81 MG: 81 TABLET, CHEWABLE ORAL at 09:32

## 2022-09-09 NOTE — PROGRESS NOTES
Spiritual Care Assessment/Progress Note  Lukas Roach      NAME: Cammy Stallworth      MRN: [id number]  AGE: 80 y.o.  SEX: male  Jain Affiliation: Hinduism   Language: English     9/9/2022     Total Time (in minutes): 14     Spiritual Assessment begun in [facility] MED/SURG through conversation with:         [x]Patient        [] Family    [] Friend(s)        Reason for Consult: Initial/Spiritual assessment, patient floor     Spiritual beliefs: (Please include comment if needed)     [x] Identifies with a ten tradition:         [] Supported by a ten community:            [] Claims no spiritual orientation:           [] Seeking spiritual identity:                [] Adheres to an individual form of spirituality:           [] Not able to assess:                           Identified resources for coping:      [] Prayer                               [] Music                  [] Guided Imagery     [x] Family/friends                 [] Pet visits     [] Devotional reading                         [] Unknown     [] Other:                                               Interventions offered during this visit: (See comments for more details)    Patient Interventions: Affirmation of emotions/emotional suffering, Affirmation of ten, Iconic (affirming the presence of God/Higher Power), Initial/Spiritual assessment, patient floor, Prayer (assurance of)           Plan of Care:     [x] Support spiritual and/or cultural needs    [] Support AMD and/or advance care planning process      [] Support grieving process   [] Coordinate Rites and/or Rituals    [] Coordination with community clergy   [] No spiritual needs identified at this time   [] Detailed Plan of Care below (See Comments)  [] Make referral to Music Therapy  [] Make referral to Pet Therapy     [] Make referral to Addiction services  [] Make referral to [facility]  [] Make referral to Spiritual Care Partner  [] No future visits requested        [] Contact Spiritual Care for further referrals     Comments: INITIAL SPIRITUAL ASSESSMENT in Med/Surg. Provided empathic listening and spiritual support. Patient was alone during visit.  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

## 2022-09-09 NOTE — PROGRESS NOTES
Problem: Falls - Risk of  Goal: *Absence of Falls  Description: Document Burton Banuelos Fall Risk and appropriate interventions in the flowsheet.   Outcome: Progressing Towards Goal  Note: Fall Risk Interventions:  Mobility Interventions: Bed/chair exit alarm    Mentation Interventions: Adequate sleep, hydration, pain control, Bed/chair exit alarm, Eyeglasses and hearing aids, Increase mobility    Medication Interventions: Bed/chair exit alarm    Elimination Interventions: Bed/chair exit alarm, Call light in reach, Patient to call for help with toileting needs, Urinal in reach    History of Falls Interventions: Bed/chair exit alarm, Door open when patient unattended         Problem: Patient Education: Go to Patient Education Activity  Goal: Patient/Family Education  Outcome: Progressing Towards Goal

## 2022-09-09 NOTE — DISCHARGE INSTRUCTIONS
Hospitalist Recommendations    Cont Eye drops for glaucoma as before admission  Hold Norvas unless Systolic BP are over 430    Cont Aspirin 81mg daily  Add Plavix 75mg daily for 21 days, then stop  Begin Lipitor 40mg each evening  F/y labs in 4-6 weeks    Return to ED for recurrent problems  Will need transfer for EEG and LP recommended    Plan outpt Neurology Consult    Alison Ontiveros [place] [address]

## 2022-09-09 NOTE — PROGRESS NOTES
Problem: Self Care Deficits Care Plan (Adult)  Goal: *Acute Goals and Plan of Care (Insert Text)  Description: FUNCTIONAL STATUS PRIOR TO ADMISSION: Patient was independent and active without use of DME.    HOME SUPPORT: The patient lived with his wife but did not require assist.    Occupational Therapy Goals  Initiated 9/8/2022  1. Patient will perform grooming standing at the sink with modified independence within 7 day(s). 2.  Patient will perform bathing with supervision/SBA within 7 day(s). 3.  Patient will perform lower body dressing with modified independence within 7 day(s). 4.  Patient will perform toilet transfers with modified independence within 7 day(s). 5.  Patient will perform all aspects of toileting with modified independence within 7 day(s). Outcome: Progressing Towards Goal  OCCUPATIONAL THERAPY TREATMENT  Patient: Antonetta Gilford (89 y.o. male)  Date: 9/9/2022  Diagnosis: Aphasia [R47.01] <principal problem not specified>      Precautions: Fall  Chart, occupational therapy assessment, plan of care, and goals were reviewed. ASSESSMENT  Patient continues with skilled OT services and is progressing towards goals. Pt received supine in bed, alert and oriented x3. Wife present. Pt and wife educated on BEFAST and s/s of stroke/TIA; printed handout to be provided. Pt agreeable to OT session. Pt able to correctly identify 4/5 ADL items on table top and verbalize/demonstrate appropriate use with min verbal cues. Pt limited by expressive aphasia and problem solving. Pt completed supine to sit with supervision and sit<>stand SBA/CGA. Pt ambulated to bathroom with no AD and CGA for safety; good standing balance while using toilet. Pt completed 3/3 grooming tasks at sink with SBA to min verbal cues; appropriate use of all ADL items.  Anticipate pt to discharge home versus rehab pending continued progress; should pt return home, recommend increased supervision initially for safety with ADL/IADLs and balance. Current Level of Function Impacting Discharge (ADLs): supervision to CGA    Other factors to consider for discharge: ongoing expressive aphasia, safety awareness         PLAN :  Patient continues to benefit from skilled intervention to address the above impairments. Continue treatment per established plan of care to address goals. Recommend with staff: TIFFANIE LINDER, ambulate to bathroom with supervision    Recommend next OT session: shower? Standing ADLs, IADLs; phone use for safety scenarious    Recommendation for discharge: (in order for the patient to meet his/her long term goals)  Occupational therapy at least 2 days/week in the home versus IPR pending progress    This discharge recommendation:  Has not yet been discussed the attending provider and/or case management    IF patient discharges home will need the following DME: none at this time       SUBJECTIVE:   Patient stated This has happened before.     OBJECTIVE DATA SUMMARY:   Cognitive/Behavioral Status:  Neurologic State: Alert; Appropriate for age  Orientation Level: Oriented X4  Cognition: Follows commands; Impaired decision making  Perception: Appears intact  Perseveration: No perseveration noted  Safety/Judgement: Awareness of environment;Decreased awareness of need for safety;Decreased awareness of need for assistance    Functional Mobility and Transfers for ADLs:  Bed Mobility:  Supine to Sit: Modified independent    Transfers:  Sit to Stand: Contact guard assistance          Balance:  Sitting: Intact  Standing: Impaired; Without support  Standing - Static: Good  Standing - Dynamic : Fair    ADL Intervention:       Grooming  Grooming Assistance: Stand-by assistance  Position Performed: Standing  Washing Face: Stand-by assistance  Brushing Teeth: Stand-by assistance  Brushing/Combing Hair: Stand-by assistance  Cues: Verbal cues provided                             Toileting  Bladder Hygiene: Stand-by assistance (standing to urinate at toilet)    Cognitive Retraining  Safety/Judgement: Awareness of environment;Decreased awareness of need for safety;Decreased awareness of need for assistance        Pain:  No c/o pain    Activity Tolerance:   Good    After treatment patient left in no apparent distress:   Sitting in chair, Call bell within reach, Bed / chair alarm activated, and Caregiver / family present    COMMUNICATION/COLLABORATION:   The patients plan of care was discussed with: Registered nurse. Patient was educated regarding His deficit(s) of expressive aphasia, decreased insight, impaired dynamic balance as this relates to His diagnosis of TIA. He demonstrated Good understanding as evidenced by verbalizing understanding. Patient and/or family was verbally educated on the BE FAST acronym for signs/symptoms of CVA and TIA. BE FAST was written on patient's communication board  for visual education and reinforcement. All questions answered with patient indicating good understanding.      Adrian Yip OT  Time Calculation: 27 mins

## 2022-09-09 NOTE — PROGRESS NOTES
Care Management Interventions  PCP Verified by CM: Yes (Dr. Mirta Solis MD)  Last Visit to PCP: 07/15/22  Palliative Care Criteria Met (RRAT>21 & CHF Dx)?: No  Mode of Transport at Discharge: Other (see comment) (POV/Spouse)  Transition of Care Consult (CM Consult): Discharge Planning  MyChart Signup: No  Discharge Durable Medical Equipment: No  Physical Therapy Consult: Yes  Occupational Therapy Consult: Yes  Speech Therapy Consult: Yes  Support Systems: Spouse/Significant Other  Confirm Follow Up Transport: Self   Resource Information Provided?: No  Discharge Location  Patient Expects to be Discharged to[de-identified] Home    Patient is being discharged home. He REFUSED home health. Gave him CM info and told him to call if she changed his mind. Patient is aware and agrees with dc plan. Told him to call for any questions or concerns. RUR: 9% LOW     Transition of Care (KARINE) Plan:  Home     KARINE Transportation:       How is patient being transported at discharge? POV     When? Today      Is transport scheduled? N/a     Follow-up appointment and transportation:     PCP? Kathy Abdi MD 9/19 at 1:30pm       Who is transporting to the follow-up appointment? POV       Is transport for follow up appointment scheduled? N/a     Communication plan (with patient/family): Patient is aware and agrees with dc plan. Who is being called? Patient or Next of Kin? Responsible party? Patient       What number(s) is to be used? [phone]      What service provider is calling for [employer] services? [facility]       When are they calling?  24--48 hours prior to john      Click here to complete [address] including selection of the Healthcare Decision Maker Relationship (ie \"Primary\")  @[email or web address]

## 2022-09-09 NOTE — PROGRESS NOTES
Discharge instructions reviewed with patient his wife  Personal belongings returned: clothing, cell phone  Home meds returned: eye drops and AREDS2  To front entrance via [facility]  Discharged home with  wife @ [phone]    Of note, patient could not be seen by PCP until 9/19/2022. Dr. Jone Mcclellan made aware and approved. Stressed importance of returning the ER if symptoms returned.

## 2022-09-09 NOTE — PROGRESS NOTES
[id number] - [facility] [address] - verified contact number ([phone]) when room assignment available.

## 2022-09-09 NOTE — PROGRESS NOTES
Bedside and Verbal shift change report given to SUNIL Linares (oncoming nurse) by Lorita Cabot, RN   (offgoing nurse). Report included the following information SBAR, Kardex, Intake/Output, MAR, Accordion, Med Rec Status, Cardiac Rhythm NSR with PVC's, and Alarm Parameters . Mobley score 5  Bed/chair alarm Yes in use. If in use it is set at the highest volume. Intravenous fluids were administered, normal saline 75 ml/hr  Patient Vitals for the past 12 hrs:   Temp Pulse Resp BP SpO2   09/08/22 2000 97.9 °F (36.6 °C) 62 20 114/71 92 %   09/08/22 1900 98.1 °F (36.7 °C) 80 20 112/62 94 %   09/08/22 1700 98.1 °F (36.7 °C) 84 20 113/63 97 %   09/08/22 1500 98.3 °F (36.8 °C) 80 20 (!) 103/56 98 %   09/08/22 1300 98.1 °F (36.7 °C) 91 20 (!) 125/58 98 %   09/08/22 1100 97.9 °F (36.6 °C) 98 20 (!) 131/53 97 %   09/08/22 0933 -- 100 -- (!) 144/79 --     No flowsheet data found. All lab results for the last 24 hours reviewed.

## 2022-09-09 NOTE — PROGRESS NOTES
Bedside shift change report given to NITESH Jimenez Rn (oncoming nurse) by SUNIL Smith (offgoing nurse). Report included the following information SBAR, Kardex, Intake/Output, MAR, Recent Results, and Quality Measures.

## 2022-09-09 NOTE — PROGRESS NOTES
Problem: Falls - Risk of  Goal: *Absence of Falls  Description: Document Bard  Fall Risk and appropriate interventions in the flowsheet.   Outcome: Progressing Towards Goal  Note: Fall Risk Interventions:  Mobility Interventions: Bed/chair exit alarm    Mentation Interventions: Adequate sleep, hydration, pain control    Medication Interventions: Patient to call before getting OOB    Elimination Interventions: Call light in reach    History of Falls Interventions: Bed/chair exit alarm         Problem: Patient Education: Go to Patient Education Activity  Goal: Patient/Family Education  Outcome: Progressing Towards Goal

## 2022-09-09 NOTE — DISCHARGE SUMMARY
[facility]  Hospitalist Discharge Summary    Patient ID:  Rozina Sparrow  [id number]  80 y.o.  1940    PCP on record: Francine Gamboa MD    Admit date: 9/8/2022  Discharge date and time: 9/9/2022     DISCHARGE DIAGNOSIS:    Principal Problem:    TIA (transient ischemic attack) (2/1/2018)    Active Problems:    Aphasia (9/8/2022)    Hemiparesis of right dominant side ([address] (9/9/2022)    Essential hypertension (1/31/2018)    Acquired hypothyroidism (1/31/2018)    Glaucoma (9/9/2022)    CONSULTATIONS:  IP CONSULT TO NEUROLOGY    Excerpted HPI from H&P of Sherron Magana MD:  Rozina Sparrow is a 80 y.o. male with past medical history of atrial fibrillation status post ablation and off anticoagulation, TIA, hypothyroidism, hypertension, who presents to the hospital for evaluation of right upper extremity weakness/numbness and speech difficulty. History obtained from patient but mostly wife who was present at bedside. Patient developed right upper extremity numbness and weakness on 9/7 which lasted for 2 to 3 hours. She was not too concerned as these episodes are not uncommon and typically resolve spontaneously. However, around 1 AM this morning, patient developed expressive aphasia which worried the wife and patient was brought in for evaluation. Code stroke was called in the ED and initial imaging studies were negative. Neurology was consulted and recommended admission for stroke work-up and MRI. Patient has only been taking aspirin for at least 1 year if not longer. Apparently, since he had not had a TIA in a while and he had bruising of his arms, he pleaded with his doctors to discontinue the Xarelto. Unclear if this was discontinued and the patient discontinued it on his own. Patient has had multiple prior TIAs that manifest as expressive aphasia and right upper extremity numbness/weakness. His last 1 was over 2 years ago.   During his initial work-up, he was found to have atrial fibrillation and was treated with anticoagulation. He had breakthrough TIAs while on Xarelto so aspirin was added in addition. He subsequently had an atrial fibrillation ablation which was apparently successful as his subsequent implantable loop recorder showed mostly sinus rhythm on interrogations (per chart review). Because there is no [employer] service and call morning, the loop recorder cannot wirelessly transmit information    ______________________________________________________________________  DISCHARGE SUMMARY/HOSPITAL COURSE:  for full details see H&P, daily progress notes, labs, consult notes.      Principal Problem:    TIA (transient ischemic attack) (2/1/2018)  Active Problems:    Aphasia (9/8/2022)    Hemiparesis of right dominant side ([address] (9/9/2022)  Similar to prior history from several years ago  Has been on aspirin 81 mg 3 days weekly  Prior history of atrial fibrillation which required Xarelto anticoagulation until an ablation procedure was completed  Patient was monitored without arrhythmia detected  Extensive work-up with CTA, CT, echocardiogram, MRI  Initiate statin therapy with Lipitor 40 mg nightly  Fasting labs were noted for an elevated LDL of 118, normal triglyceride 36 and HDL 43  Advance aspirin 81 mg daily  Initiate dual antiplatelet therapy with Plavix 75 mg daily for 21 days  Plans to follow-up with PCP for possible neurology referral  Patient deferred staying in the hospital longer for telemedicine neurology consultation with Dr. Cristine Strickland hypertension (1/31/2018)  Patient has been on Norvasc 5 mg daily  Treatment held on admission for permissive hypertension post TIA  Blood pressure appeared to be well controlled prior to discharge off treatment  Will monitor at home and resume treatment with Norvasc to keep systolic blood pressure below 140      Acquired hypothyroidism (1/31/2018)  Maintain home treatment with levothyroxine 75 mcg daily  TSH assessment was not completed during this brief admission      Glaucoma (9/9/2022)  Patient was maintained on preadmission topical treatment with eyedrops  Patient is followed by ophthalmology    _______________________________________________________________________  Patient seen and examined by me on discharge day. Pertinent Findings:  Visit Vitals  /69 (BP 1 Location: Left upper arm, BP Patient Position: Sitting)   Pulse 74   Temp 97.7 °F (36.5 °C)   Resp 20   Ht 6' 5\" (1.956 m)   Wt 81.6 kg (180 lb)   SpO2 96%   BMI 21.34 kg/m²     Gen:    Not in distress  Chest: Nonlabored respiration, Clear lungs  CVS:   Regular rhythm. No edema  Abd:  Soft, not distended, not tender  Neuro:  Alert, nonfocal    LABS:   9/8/22 02:45   WBC 9.4   NRBC 0.0   RBC 4.81   HGB 13.7   HCT 40.7   MCV 84.6   MCH 28.5   MCHC 33.7   RDW 13.1   PLATELET 783   MPV 9.8   NEUTROPHILS 80 (H)   LYMPHOCYTES 10 (L)   MONOCYTES 8   EOSINOPHILS 1      9/8/22 02:55   Color YELLOW/STRAW   Appearance CLEAR   Specific gravity 1.010   pH (UA) 8.0   Protein Negative   Glucose Negative   Ketone Negative   Blood TRACE ! Bilirubin Negative   Urobilinogen 0.2   Nitrites Negative   Leukocyte Esterase Negative   Epithelial cells FEW   WBC 0-4   RBC 5-10   Bacteria Negative      9/8/22 02:45   INR 1.1   Protime 10.9      9/8/22 02:45 9/9/22 05:25   Sodium 131 (L) 133 (L)   Potassium 3.9 3.3 (L)   Chloride 96 (L) 98   CO2 32 29   Anion gap 3 (L) 6   Glucose 121 (H) 108 (H)   BUN 9 15   Creatinine 0.67 (L) 0.60 (L)   BUN/Creatinine ratio 13 25 (H)   Calcium 8.6 8.2 (L)   Magnesium 1.9    GFR est non-AA >60 >60   GFR est AA >60 >60   Bilirubin, total 0.4    Protein, total 6.6    Albumin 3.6    Globulin 3.0    A-G Ratio 1.2    ALT 19    AST 18    Alk.  phosphatase 107    Triglyceride 36    Cholesterol, total 168    HDL Cholesterol 43    CHOL/HDL Ratio 3.9    LDL, calculated 117.8 (H)    VLDL, calculated 7.2    Troponin-High Sensitivity 5        RADIOLOGY  CT HEAD  The ventricles are normal in size and position. Scattered periventricular and  deep white matter hypodensities, consistent with mild chronic microangiopathic  ischemic changes. Basilar cisterns are patent. No midline shift. There is no  evidence of acute infarct, hemorrhage, or extraaxial fluid collection. The paranasal sinuses, mastoid air cells, and middle ears are clear. The orbital  contents are within normal limits with bilateral lens implants. There are no  significant osseous or extracranial soft tissue lesions. IMPRESSION: No evidence of acute intracranial abnormality    CTA HEAD / NECK  CTA Head:  No evidence of significant stenosis or aneurysm. CTA Neck:  No evidence of significant stenosis. MRI BRAIN  Generalized cortical atrophy. The ventricles are normal in size and  configuration without hydrocephalus. There is no mass effect or midline shift. There is no extra-axial fluid collection. Confluent as well as patchy T2/FLAIR  hyperintense signal in the periventricular white matter likely reflect chronic  small vessel disease. There is no abnormal restricted diffusion or gradient susceptibility. The major intracranial vascular flow-voids are patent. Medial temporal lobes are  symmetric. The midline structures, including the cervicomedullary junction, are  within normal limits. IMPRESSION  1. No acute intracranial abnormality. No evidence of ischemia. 2.  Subcortical/periventricular white matter signal abnormalities likely reflect  chronic age-related small vessel ischemic disease. ECHO    Left Ventricle: Normal left ventricular systolic function with a visually estimated EF of 60 - 65%. Left ventricle size is normal. Normal wall thickness. Normal wall motion. Normal diastolic function. Aorta: Normal sized ascending aorta. Dilated aortic root. Ao Root diameter is 4.0 cm.     EKG 9/8:  NSR 85 bpm, 1st Deg AVB, LAE, No PACs as noted [id number]    _______________________________________________________________________  DISCHARGE MEDICATIONS:   Current Discharge Medication List        START taking these medications    Details   atorvastatin (LIPITOR) 40 mg tablet Take 1 Tablet by mouth nightly. Qty: 30 Tablet, Refills: 0  Start date: 9/9/2022      clopidogreL (Plavix) 75 mg tab Take 1 Tablet by mouth daily for 21 days. Qty: 21 Tablet, Refills: 0  Start date: 9/9/2022, End date: 9/30/2022           CONTINUE these medications which have NOT CHANGED    Details   bimatoprost (LUMIGAN) 0.01 % ophthalmic drops Administer 1 Drop to both eyes every evening. VIT C/E/ZN/COPPR/LUTEIN/ZEAXAN (PRESERVISION AREDS 2 PO) Take  by mouth two (2) times a day. Associated Diagnoses: Essential hypertension      aspirin 81 mg chewable tablet Take 1 Tab by mouth daily. levothyroxine (SYNTHROID) 75 mcg tablet Take 1 Tab by mouth Daily (before breakfast). timolol (TIMOPTIC) 0.5 % ophthalmic solution Administer 1 Drop to both eyes daily.            STOP taking these medications       triamcinolone acetonide (KENALOG) 0.025 % topical cream Comments:   Reason for Stopping:         augmented betamethasone dipropionate (DIPROLENE-AF) 0.05 % topical cream Comments:   Reason for Stopping:         amLODIPine (NORVASC) 5 mg tablet Comments:   Reason for Stopping:               My Recommended  Diet: Cardiac  Activity: Ad Em  Wound Care: none  Follow-up labs: Check Lipids in 4-6 weeks    Hospitalist Recommendations    Cont Eye drops for glaucoma as before admission  Hold Norvasc unless Systolic BP are over 321    Cont Aspirin 81mg daily  Add Plavix 75mg daily for 21 days, then stop  Begin Lipitor 40mg each evening  F/y labs in 4-6 weeks    Return to ED for recurrent problems  Will need transfer for EEG and LP recommended    Plan outpt Neurology Consult  Alison Ontiveros [address]    ______________________________________________________________________  DISPOSITION: Home with Family: x   Home with HH/PT/OT/RN:    SNF/LTC:    [facility]:    OTHER:        Condition at Discharge:  Stable  _____________________________________________________________________  Follow up with:   PCP : Suma Vargas MD  Follow-up Information       Follow up With Specialties Details Why Nima Hutchinson MD Internal Medicine Physician Follow up in 3 day(s)  [address] [facility]  [phone]            Total time in minutes spent coordinating this discharge (includes going over instructions, follow-up, prescriptions, and preparing report for sign off to her PCP) :35 minutes    Signed:  Kathryn Ralph MD  [facility] Hospitalist  [phone]

## 2022-09-09 NOTE — PROGRESS NOTES
IDR Team; MD, Care Manager , Nursing, Dietitian  and Pharmacy met to review patient's plan of care. Discussed goals, interventions, barriers and progress. RUR:  11%    Team will continue to monitor progress and report any concerns to the physician and care management as indicated. Transition of Care Plan:    Patient's symptoms resolved. Attending is going to consult with Dr. Nuzhat Amaro, Neurologist for recommendations. Patient will need and EEG and LP, outpatient testing.

## 2022-09-10 ENCOUNTER — HOSPITAL ENCOUNTER (EMERGENCY)
Age: 82
Discharge: HOME | End: 2022-09-10
Attending: EMERGENCY MEDICINE
Payer: MEDICARE

## 2022-09-10 ENCOUNTER — APPOINTMENT (OUTPATIENT)
Dept: CT IMAGING | Age: 82
End: 2022-09-10
Attending: EMERGENCY MEDICINE
Payer: MEDICARE

## 2022-09-10 VITALS
SYSTOLIC BLOOD PRESSURE: 131 MMHG | DIASTOLIC BLOOD PRESSURE: 64 MMHG | RESPIRATION RATE: 15 BRPM | HEIGHT: 75 IN | TEMPERATURE: 98 F | HEART RATE: 93 BPM | BODY MASS INDEX: 23 KG/M2 | WEIGHT: 185 LBS | OXYGEN SATURATION: 97 %

## 2022-09-10 DIAGNOSIS — R11.2 NAUSEA AND VOMITING, UNSPECIFIED VOMITING TYPE: Primary | ICD-10-CM

## 2022-09-10 DIAGNOSIS — R51.9 ACUTE NONINTRACTABLE HEADACHE, UNSPECIFIED HEADACHE TYPE: ICD-10-CM

## 2022-09-10 LAB
ALBUMIN SERPL-MCNC: 4 G/DL (ref 3.5–5)
ALBUMIN/GLOB SERPL: 1.1 {RATIO} (ref 1.1–2.2)
ALP SERPL-CCNC: 106 U/L (ref 45–117)
ALT SERPL-CCNC: 28 U/L (ref 12–78)
ANION GAP SERPL CALC-SCNC: 7 MMOL/L (ref 5–15)
APPEARANCE UR: ABNORMAL
AST SERPL-CCNC: 34 U/L (ref 15–37)
ATRIAL RATE: 95 BPM
BACTERIA URNS QL MICRO: NEGATIVE /HPF
BASOPHILS # BLD: 0.1 K/UL (ref 0–0.1)
BASOPHILS NFR BLD: 0 % (ref 0–1)
BILIRUB SERPL-MCNC: 0.9 MG/DL (ref 0.2–1)
BILIRUB UR QL: NEGATIVE
BUN SERPL-MCNC: 12 MG/DL (ref 6–20)
BUN/CREAT SERPL: 17 (ref 12–20)
CALCIUM SERPL-MCNC: 9.1 MG/DL (ref 8.5–10.1)
CALCULATED P AXIS, ECG09: 74 DEGREES
CALCULATED R AXIS, ECG10: 56 DEGREES
CALCULATED T AXIS, ECG11: 71 DEGREES
CHLORIDE SERPL-SCNC: 93 MMOL/L (ref 97–108)
CO2 SERPL-SCNC: 30 MMOL/L (ref 21–32)
COLOR UR: ABNORMAL
CREAT SERPL-MCNC: 0.71 MG/DL (ref 0.7–1.3)
DIAGNOSIS, 93000: NORMAL
DIFFERENTIAL METHOD BLD: ABNORMAL
EOSINOPHIL # BLD: 0 K/UL (ref 0–0.4)
EOSINOPHIL NFR BLD: 0 % (ref 0–7)
EPITH CASTS URNS QL MICRO: ABNORMAL /LPF
ERYTHROCYTE [DISTWIDTH] IN BLOOD BY AUTOMATED COUNT: 13 % (ref 11.5–14.5)
GLOBULIN SER CALC-MCNC: 3.8 G/DL (ref 2–4)
GLUCOSE BLD STRIP.AUTO-MCNC: 113 MG/DL (ref 65–117)
GLUCOSE SERPL-MCNC: 119 MG/DL (ref 65–100)
GLUCOSE UR STRIP.AUTO-MCNC: NEGATIVE MG/DL
HCT VFR BLD AUTO: 43.1 % (ref 36.6–50.3)
HGB BLD-MCNC: 15 G/DL (ref 12.1–17)
HGB UR QL STRIP: NEGATIVE
HYALINE CASTS URNS QL MICRO: ABNORMAL /LPF (ref 0–2)
IMM GRANULOCYTES # BLD AUTO: 0.1 K/UL (ref 0–0.04)
IMM GRANULOCYTES NFR BLD AUTO: 0 % (ref 0–0.5)
INR PPP: 1.1 (ref 0.9–1.1)
KETONES UR QL STRIP.AUTO: 15 MG/DL
LEUKOCYTE ESTERASE UR QL STRIP.AUTO: NEGATIVE
LYMPHOCYTES # BLD: 0.9 K/UL (ref 0.8–3.5)
LYMPHOCYTES NFR BLD: 7 % (ref 12–49)
MCH RBC QN AUTO: 28.7 PG (ref 26–34)
MCHC RBC AUTO-ENTMCNC: 34.8 G/DL (ref 30–36.5)
MCV RBC AUTO: 82.4 FL (ref 80–99)
MONOCYTES # BLD: 0.6 K/UL (ref 0–1)
MONOCYTES NFR BLD: 5 % (ref 5–13)
NEUTS SEG # BLD: 11.8 K/UL (ref 1.8–8)
NEUTS SEG NFR BLD: 88 % (ref 32–75)
NITRITE UR QL STRIP.AUTO: NEGATIVE
NRBC # BLD: 0 K/UL (ref 0–0.01)
NRBC BLD-RTO: 0 PER 100 WBC
P-R INTERVAL, ECG05: 194 MS
PH UR STRIP: 7.5 [PH] (ref 5–8)
PLATELET # BLD AUTO: 194 K/UL (ref 150–400)
PMV BLD AUTO: 9.9 FL (ref 8.9–12.9)
POTASSIUM SERPL-SCNC: 4.5 MMOL/L (ref 3.5–5.1)
PROT SERPL-MCNC: 7.8 G/DL (ref 6.4–8.2)
PROT UR STRIP-MCNC: 30 MG/DL
PROTHROMBIN TIME: 11.1 SEC (ref 9–11.1)
Q-T INTERVAL, ECG07: 380 MS
QRS DURATION, ECG06: 96 MS
QTC CALCULATION (BEZET), ECG08: 477 MS
RBC # BLD AUTO: 5.23 M/UL (ref 4.1–5.7)
RBC #/AREA URNS HPF: ABNORMAL /HPF (ref 0–5)
SERVICE CMNT-IMP: NORMAL
SODIUM SERPL-SCNC: 130 MMOL/L (ref 136–145)
SP GR UR REFRACTOMETRY: 1.01
UA: UC IF INDICATED,UAUC: ABNORMAL
UROBILINOGEN UR QL STRIP.AUTO: 0.2 EU/DL (ref 0.2–1)
VENTRICULAR RATE, ECG03: 95 BPM
WBC # BLD AUTO: 13.4 K/UL (ref 4.1–11.1)
WBC URNS QL MICRO: ABNORMAL /HPF (ref 0–4)

## 2022-09-10 PROCEDURE — 74011000636 HC RX REV CODE- 636: Performed by: EMERGENCY MEDICINE

## 2022-09-10 PROCEDURE — 80053 COMPREHEN METABOLIC PANEL: CPT

## 2022-09-10 PROCEDURE — 93005 ELECTROCARDIOGRAM TRACING: CPT

## 2022-09-10 PROCEDURE — 85025 COMPLETE CBC W/AUTO DIFF WBC: CPT

## 2022-09-10 PROCEDURE — 36415 COLL VENOUS BLD VENIPUNCTURE: CPT

## 2022-09-10 PROCEDURE — 70450 CT HEAD/BRAIN W/O DYE: CPT

## 2022-09-10 PROCEDURE — 82962 GLUCOSE BLOOD TEST: CPT

## 2022-09-10 PROCEDURE — 81001 URINALYSIS AUTO W/SCOPE: CPT

## 2022-09-10 PROCEDURE — 85610 PROTHROMBIN TIME: CPT

## 2022-09-10 PROCEDURE — 70496 CT ANGIOGRAPHY HEAD: CPT

## 2022-09-10 RX ORDER — ONDANSETRON 4 MG/1
4 TABLET, FILM COATED ORAL
Qty: 20 TABLET | Refills: 0 | Status: SHIPPED | OUTPATIENT
Start: 2022-09-10

## 2022-09-10 RX ADMIN — IOPAMIDOL 100 ML: 755 INJECTION, SOLUTION INTRAVENOUS at 00:41

## 2022-09-10 NOTE — DISCHARGE INSTRUCTIONS
It was a pleasure taking care of you in our Emergency Department today. We know that when you come to [facility], you are entrusting us with your health, comfort, and safety. Our physicians and nurses honor that trust, and truly appreciate the opportunity to care for you and your loved ones. We also value your feedback. If you receive a survey about your Emergency Department experience today, please fill it out. We care about our patients' feedback, and we listen to what you have to say. Thank you!       Dr. Jade Story MD.

## 2022-09-10 NOTE — ED TRIAGE NOTES
Pt arrives wheeled into triage with his wife with c/o N/V, headache, and confusion. Pt was D/C'd from [facility] today after begin admitted for TIA. Pt reports some weakness earlier this evening, says it has subsided now. Wife reports pt was \"normal\" when leaving [facility], and noticed a change around [address].  Alex Marshall MD to triage at 0010 to [facility].

## 2022-09-10 NOTE — ED PROVIDER NOTES
EMERGENCY DEPARTMENT HISTORY AND PHYSICAL EXAM     ----------------------------------------------------------------------------  Please note that this dictation was completed with [employer], the computer voice recognition software. Quite often unanticipated grammatical, syntax, homophones, and other interpretive errors are inadvertently transcribed by the computer software. Please disregard these errors. Please excuse any errors that have escaped final proofreading  ----------------------------------------------------------------------------      Date: 9/10/2022  Patient Name: Marcellus Mortimer    History of Presenting Illness     Chief Complaint   Patient presents with    Headache    Vomiting     Pt wheeled into triage with his wife with c/o N/V and headache. Pt was D/C from [facility] earlier today, and wife reports he was \"totally\" fine then. History Provided By:  Patient    HPI: Marcellus Mortimer is a 80 y.o. male, with significant pmhx of essential hypertension, acquired hypothyroidism, aphasia, hemiparesis of right dominant side, paroxysmal atrial fibrillation, who presents via private vehicle to the ED with c/o nausea, vomiting and global headache that started after returning  from the hospital after discharge earlier today. Patient was recently admitted to [facility] general for TIA work-up and discharged this evening. Patient's wife brought him home and then subsequently went to go run errands. Upon returning from her parents patient was having episodes of vomiting, transient confusion that is no longer present, and complaint of global headache. Patient was given Lovenox early this morning and given a prescription for Plavix that he has not initiated yet. Patient  specifically denies any associated fevers, chills, CP, SOB, diarrhea, abd pain, changes in BM, urinary sxs, or headache. Per patient's recent discharge summary:    Patient has only been taking aspirin for at least 1 year if not longer. Apparently, since he had not had a TIA in a while and he had bruising of his arms, he pleaded with his doctors to discontinue the Xarelto. Unclear if this was discontinued and the patient discontinued it on his own. Patient has had multiple prior TIAs that manifest as expressive aphasia and right upper extremity numbness/weakness. His last 1 was over 2 years ago. During his initial work-up, he was found to have atrial fibrillation and was treated with anticoagulation. He had breakthrough TIAs while on Xarelto so aspirin was added in addition. He subsequently had an atrial fibrillation ablation which was apparently successful as his subsequent implantable loop recorder showed mostly sinus rhythm on interrogations (per chart review). Because there is no [employer] service and call morning, the loop recorder cannot wirelessly transmit information    Social Hx: denies tobacco  denies EtOH , denies recreational/Illicit Drugs    There are no other complaints, changes, or physical findings at this time. PCP: Marika Christensen MD    Allergies   Allergen Reactions    Chocolate Flavor Other (comments)     migraines       Current Outpatient Medications   Medication Sig Dispense Refill    ondansetron hcl (Zofran) 4 mg tablet Take 1 Tablet by mouth every eight (8) hours as needed for Nausea. 20 Tablet 0    atorvastatin (LIPITOR) 40 mg tablet Take 1 Tablet by mouth nightly. 30 Tablet 0    clopidogreL (Plavix) 75 mg tab Take 1 Tablet by mouth daily for 21 days. 21 Tablet 0    bimatoprost (LUMIGAN) 0.01 % ophthalmic drops Administer 1 Drop to both eyes every evening. VIT C/E/ZN/COPPR/LUTEIN/ZEAXAN (PRESERVISION AREDS 2 PO) Take  by mouth two (2) times a day. aspirin 81 mg chewable tablet Take 1 Tab by mouth daily. levothyroxine (SYNTHROID) 75 mcg tablet Take 1 Tab by mouth Daily (before breakfast). timolol (TIMOPTIC) 0.5 % ophthalmic solution Administer 1 Drop to both eyes daily.          Past History Past Medical History:  Past Medical History:   Diagnosis Date    Atrial fibrillation ([facility] [address]     CAD (coronary artery disease)     Cancer (HCC)     Skin- basal and squamous     Congestive heart failure ([facility] [address]     Glaucoma     Hypertension     Hypothyroidism     Stroke [facility])        Past Surgical History:  Past Surgical History:   Procedure Laterality Date    CATH PERICARDIOCENTESIS  4/22/2018         HX APPENDECTOMY  1947    HX CATARACT REMOVAL  2009 x 2    HX THYROIDECTOMY  1950 Partial,    2003 Full    SD CARDIAC SURG PROCEDURE UNLIST  04/09/2018    cardiac ablation, implant permanent cardiac monitor-[employer]       Family History:  No family history on file. Social History:  Social History     Tobacco Use    Smoking status: Never    Smokeless tobacco: Never   Vaping Use    Vaping Use: Never used   Substance Use Topics    Alcohol use: No    Drug use: No       Allergies: Allergies   Allergen Reactions    Chocolate Flavor Other (comments)     migraines         Review of Systems   Review of Systems   Constitutional:  Negative for chills and fever. HENT: Negative. Eyes: Negative. Respiratory:  Negative for cough, chest tightness and shortness of breath. Cardiovascular:  Negative for chest pain and leg swelling. Gastrointestinal:  Positive for vomiting. Negative for abdominal pain, diarrhea and nausea. Endocrine: Negative. Genitourinary:  Negative for difficulty urinating and dysuria. Musculoskeletal:  Negative for myalgias. Skin: Negative. Neurological:  Positive for headaches. Psychiatric/Behavioral:  Positive for confusion. All other systems reviewed and are negative. Physical Exam   Physical Exam  Vitals and nursing note reviewed. Constitutional:       General: He is not in acute distress. Appearance: He is well-developed. He is not diaphoretic. Comments: Pt noted to have vomitus down his shirt   HENT:      Head: Normocephalic and atraumatic.       Nose: Nose normal.      Mouth/Throat:      Pharynx: No oropharyngeal exudate. Eyes:      Conjunctiva/sclera: Conjunctivae normal.      Pupils: Pupils are equal, round, and reactive to light. Neck:      Vascular: No JVD. Cardiovascular:      Rate and Rhythm: Normal rate. Rhythm irregular. Heart sounds: Normal heart sounds. No murmur heard. No friction rub. Pulmonary:      Effort: Pulmonary effort is normal. No respiratory distress. Breath sounds: Normal breath sounds. No stridor. No wheezing or rales. Abdominal:      General: Bowel sounds are normal. There is no distension. Palpations: Abdomen is soft. Tenderness: There is no abdominal tenderness. There is no rebound. Musculoskeletal:         General: No tenderness. Normal range of motion. Cervical back: Normal range of motion and neck supple. Skin:     General: Skin is warm and dry. Findings: No rash. Neurological:      General: No focal deficit present. Mental Status: He is alert and oriented to person, place, and time. Mental status is at baseline. Cranial Nerves: No cranial nerve deficit. Sensory: No sensory deficit. Motor: No weakness (generalized, not focal). Psychiatric:         Speech: Speech normal.         Behavior: Behavior normal.         Thought Content: Thought content normal.         Judgment: Judgment normal.         Diagnostic Study Results     Labs -     Recent Results (from the past 12 hour(s))   CBC WITH AUTOMATED DIFF    Collection Time: 09/10/22 12:24 AM   Result Value Ref Range    WBC 13.4 (H) 4.1 - 11.1 K/uL    RBC 5.23 4. 10 - 5.70 M/uL    HGB 15.0 12.1 - 17.0 g/dL    HCT 43.1 36.6 - 50.3 %    MCV 82.4 80.0 - 99.0 FL    MCH 28.7 26.0 - 34.0 PG    MCHC 34.8 30.0 - 36.5 g/dL    RDW 13.0 11.5 - 14.5 %    PLATELET 428 535 - 619 K/uL    MPV 9.9 8.9 - 12.9 FL    NRBC 0.0 0  WBC    ABSOLUTE NRBC 0.00 0.00 - 0.01 K/uL    NEUTROPHILS 88 (H) 32 - 75 %    LYMPHOCYTES 7 (L) 12 - 49 % MONOCYTES 5 5 - 13 %    EOSINOPHILS 0 0 - 7 %    BASOPHILS 0 0 - 1 %    IMMATURE GRANULOCYTES 0 0.0 - 0.5 %    ABS. NEUTROPHILS 11.8 (H) 1.8 - 8.0 K/UL    ABS. LYMPHOCYTES 0.9 0.8 - 3.5 K/UL    ABS. MONOCYTES 0.6 0.0 - 1.0 K/UL    ABS. EOSINOPHILS 0.0 0.0 - 0.4 K/UL    ABS. BASOPHILS 0.1 0.0 - 0.1 K/UL    ABS. IMM. GRANS. 0.1 (H) 0.00 - 0.04 K/UL    DF AUTOMATED     METABOLIC PANEL, COMPREHENSIVE    Collection Time: 09/10/22 12:24 AM   Result Value Ref Range    Sodium 130 (L) 136 - 145 mmol/L    Potassium 4.5 3.5 - 5.1 mmol/L    Chloride 93 (L) 97 - 108 mmol/L    CO2 30 21 - 32 mmol/L    Anion gap 7 5 - 15 mmol/L    Glucose 119 (H) 65 - 100 mg/dL    BUN 12 6 - 20 MG/DL    Creatinine 0.71 0.70 - 1.30 MG/DL    BUN/Creatinine ratio 17 12 - 20      GFR est AA >60 >60 ml/min/1.73m2    GFR est non-AA >60 >60 ml/min/1.73m2    Calcium 9.1 8.5 - 10.1 MG/DL    Bilirubin, total 0.9 0.2 - 1.0 MG/DL    ALT (SGPT) 28 12 - 78 U/L    AST (SGOT) 34 15 - 37 U/L    Alk.  phosphatase 106 45 - 117 U/L    Protein, total 7.8 6.4 - 8.2 g/dL    Albumin 4.0 3.5 - 5.0 g/dL    Globulin 3.8 2.0 - 4.0 g/dL    A-G Ratio 1.1 1.1 - 2.2     PROTHROMBIN TIME + INR    Collection Time: 09/10/22 12:24 AM   Result Value Ref Range    INR 1.1 0.9 - 1.1      Prothrombin time 11.1 9.0 - 11.1 sec   EKG, 12 LEAD, INITIAL    Collection Time: 09/10/22 12:25 AM   Result Value Ref Range    Ventricular Rate 95 BPM    Atrial Rate 95 BPM    P-R Interval 194 ms    QRS Duration 96 ms    Q-T Interval 380 ms    QTC Calculation (Bezet) 477 ms    Calculated P Axis 74 degrees    Calculated R Axis 56 degrees    Calculated T Axis 71 degrees    Diagnosis       Sinus rhythm with frequent premature ventricular complexes  When compared with ECG of 08-SEP-2022 02:58,  premature ventricular complexes are now present     GLUCOSE, POC    Collection Time: 09/10/22  1:13 AM   Result Value Ref Range    Glucose (POC) 113 65 - 117 mg/dL    Performed by Milan Meyer RN    URINALYSIS W/ REFLEX CULTURE    Collection Time: 09/10/22  1:45 AM    Specimen: Urine   Result Value Ref Range    Color YELLOW/STRAW      Appearance CLOUDY (A) CLEAR      Specific gravity 1.012      pH (UA) 7.5 5.0 - 8.0      Protein 30 (A) NEG mg/dL    Glucose Negative NEG mg/dL    Ketone 15 (A) NEG mg/dL    Bilirubin Negative NEG      Blood Negative NEG      Urobilinogen 0.2 0.2 - 1.0 EU/dL    Nitrites Negative NEG      Leukocyte Esterase Negative NEG      UA:UC IF INDICATED CULTURE NOT INDICATED BY UA RESULT      WBC 0-4 0 - 4 /hpf    RBC 0-5 0 - 5 /hpf    Epithelial cells FEW FEW /lpf    Bacteria Negative NEG /hpf    Hyaline cast 0-2 0 - 2 /lpf       Radiologic Studies -   CT HEAD WO CONT   Final Result      No acute intracranial abnormality on this noncontrast head CT. No change. CTA HEAD NECK W CONT   Final Result      1. No acute process. No large vessel occlusion, arterial dissection, or   flow-limiting stenosis. 2. CT perfusion not performed. CT Results  (Last 48 hours)                 09/10/22 0037  CT HEAD WO CONT Final result    Impression:      No acute intracranial abnormality on this noncontrast head CT. No change. Narrative:  EXAM: CT HEAD WO CONT       INDICATION: Nausea, vomiting, headache, recently started on Lovenox after   diagnosis of TIA. COMPARISON: CT head on 9/10/2022. MRI brain on 9/8/2022. TECHNIQUE: Noncontrast head CT. Coronal and sagittal reformats. CT dose   reduction was achieved through the use of a standardized protocol tailored for   this examination and automatic exposure control for dose modulation. FINDINGS: The ventricles and sulci are age-appropriate without hydrocephalus. There is no mass effect or midline shift. There is no intracranial hemorrhage or   extra-axial fluid collection. Chronic microvascular ischemic disease is   unchanged. No CT evidence of acute infarct. The calvarium is intact.  The visualized paranasal sinuses and mastoid air cells   are clear.           09/10/22 0037  CTA HEAD NECK W CONT Final result    Impression:      1. No acute process. No large vessel occlusion, arterial dissection, or   flow-limiting stenosis. 2. CT perfusion not performed. Narrative:  *PRELIMINARY REPORT*       No vascular occlusion or flow-limiting stenosis. No enhancing intracranial mass. Cervical spine degenerative disease. Preliminary report was provided by Dr. Awilda Castillo, the on-call radiologist, at [facility] 278   hours       Final report to follow. *END PRELIMINARY REPORT*       FINAL REPORT:       INDICATION: new onset headache/n/v       EXAMINATION:  CT ANGIOGRAPHY HEAD AND NECK        COMPARISON: CT head earlier the same day, MRI September 8, 2022, CTA September 8, 2022       TECHNIQUE:  Following the uneventful administration of iodinated contrast   material, axial CT angiography of the head and neck was performed. Delayed axial   images through the head were also obtained. Coronal and sagittal reconstructions   were obtained. Manual postprocessing of images was performed. 3-D  Sagittal   maximal intensity projection images were obtained. 3-D Coronal maximal   intensity projections were obtained. CT dose reduction was achieved through use   of a standardized protocol tailored for this examination and automatic exposure   control for dose modulation. FINDINGS:       CTA NECK:       Aortic Arch: No significant abnormality. Right Common Carotid Artery: No significant abnormality. Right Internal Carotid Artery: Patent origin. Mild atherosclerosis at the   proximal cervical segment, without stenosis. No dissection. NASCET Right: 0-25%. Left Common Carotid Artery: No significant abnormality. Left Internal Carotid Artery: Minimal calcific atherosclerosis at the origin   without significant stenosis. NASCET Left: 0-25%. Carotid stenosis determined using NASCET criteria.    Right Vertebral Artery: No significant abnormality. Left Vertebral Artery: No significant abnormality. Cervical Soft Tissues: No significant abnormality. Lung Apices: Mild biapical pleural thickening, and paraseptal bleb medial right   apex. Bones: No destructive bone lesion. Degenerative changes throughout the cervical   spine most notable C5-6   Additional Comments: N/A.       CTA HEAD:       Posterior Circulation: Patent vertebral arteries with the right larger than the   left. Basilar artery is patent, with fullness at the basilar tip likely related   to common origins of the posterior cerebral and superior cerebellar arteries, as   well as slight tortuosity of the distal basilar artery. No saccular aneurysm. Anterior Circulation: No flow limiting stenosis or occlusion. Additional Comments: No evidence of aneurysm or vascular malformation. Note: Cerebral perfusion not performed. CXR Results  (Last 48 hours)      None              Medical Decision Making   I am the first provider for this patient. I reviewed the vital signs, available nursing notes, past medical history, past surgical history, family history and social history. Vital Signs-Reviewed the patient's vital signs.   Patient Vitals for the past 12 hrs:   Temp Pulse Resp BP SpO2   09/10/22 0445 -- 93 15 131/64 --   09/10/22 0408 -- (!) 101 21 (!) 119/56 97 %   09/10/22 0331 -- 98 24 130/65 98 %   09/10/22 0316 -- 94 30 (!) 122/51 97 %   09/10/22 0256 -- 90 (!) 33 (!) 125/58 97 %   09/10/22 0216 98 °F (36.7 °C) 98 25 133/78 96 %   09/10/22 0126 -- 91 30 136/68 95 %   09/10/22 0106 98.1 °F (36.7 °C) 93 28 (!) 149/71 97 %   09/10/22 0056 -- 91 (!) 6 (!) 150/66 96 %   09/10/22 0006 98 °F (36.7 °C) 60 18 (!) 140/62 98 %       Pulse Oximetry Analysis - 97% on RA, normal  Rate: 93 bpm  Rhythm: nsr      Provider Notes (Medical Decision Making):     DDX:  Intracranial bleed, aneurysm, electrolyte derangement, dehydration, UTI    Plan:  Labs, CT head, CTA, antiemetic, IV fluids, UA    Impression:  Nausea, vomiting and transient headache    ED Course:   Initial assessment performed. The patients presenting problems have been discussed, and they are in agreement with the care plan formulated and outlined with them. I have encouraged them to ask questions as they arise throughout their visit. I reviewed the nursing notes and and vital signs from today's visit, as well as the electronic medical record system for any past medical records that were available that may contribute to the patients current condition, including recent hospitalization and discharge summary    Nursing notes will be reviewed as they become available in realtime while the pt has been in the ED. Yevgeniy Majano MD      I personally reviewed/interpreted pt's imaging. Agree with official read by radiology as noted above. Yevgeniy Majano MD      4:54 AM  Progress note:  Pt noted to be feeling better, no longer nauseous, having complete resolution of previously noted symptoms including headache. Tolerated p.o. without subsequent nausea or vomiting. Patient's wife notes that he is back to his baseline. Ready for discharge. Discussed lab and imaging findings with pt, specifically noting no evidence of intracranial bleed or change from recent hospitalization imaging. Patient and wife note that they would like to be discharged at this time for follow-up as previously planned at discharge yesterday. Pt will follow up with primary care and neurology as instructed. All questions have been answered, pt voiced understanding and agreement with plan. Specific return precautions provided in addition to instructions for pt to return to the ED immediately should sx worsen at any time. Yevgeniy Majano MD           Critical Care Time:     none      Diagnosis     Clinical Impression:   1. Nausea and vomiting, unspecified vomiting type    2.  Acute nonintractable headache, unspecified headache type        PLAN:  1. Discharge Medication List as of 9/10/2022  4:53 AM        START taking these medications    Details   ondansetron hcl (Zofran) 4 mg tablet Take 1 Tablet by mouth every eight (8) hours as needed for Nausea., Normal, Disp-20 Tablet, R-0           CONTINUE these medications which have NOT CHANGED    Details   atorvastatin (LIPITOR) 40 mg tablet Take 1 Tablet by mouth nightly., Normal, Disp-30 Tablet, R-0      clopidogreL (Plavix) 75 mg tab Take 1 Tablet by mouth daily for 21 days. , Normal, Disp-21 Tablet, R-0      bimatoprost (LUMIGAN) 0.01 % ophthalmic drops Administer 1 Drop to both eyes every evening., Historical Med      VIT C/E/ZN/COPPR/LUTEIN/ZEAXAN (PRESERVISION AREDS 2 PO) Take  by mouth two (2) times a day., Historical Med      aspirin 81 mg chewable tablet Take 1 Tab by mouth daily. , OTC      levothyroxine (SYNTHROID) 75 mcg tablet Take 1 Tab by mouth Daily (before breakfast). , Historical Med      timolol (TIMOPTIC) 0.5 % ophthalmic solution Administer 1 Drop to both eyes daily. , Historical Med           STOP taking these medications       triamcinolone acetonide (KENALOG) 0.025 % topical cream Comments:   Reason for Stopping:         augmented betamethasone dipropionate (DIPROLENE-AF) 0.05 % topical cream Comments:   Reason for Stopping:         amLODIPine (NORVASC) 5 mg tablet Comments:   Reason for Stoppin.   Follow-up Information       Follow up With Specialties Details Why Marcela Sams MD Internal Medicine Physician Schedule an appointment as soon as possible for a visit in 2 days  [address] [facility]  [phone]      Your Neurologist  Schedule an appointment as soon as possible for a visit       [facility] EMERGENCY DEPT Emergency Medicine   [address]  [address] [facility]  [phone]          Return to ED if worse     Disposition:  4:54 AM  The patient's results have been reviewed with family and/or caregiver. They verbally convey their understanding and agreement of the patient's signs, symptoms, diagnosis, treatment and prognosis and additionally agree to follow up as recommended in the discharge instructions or to return to the Emergency Room should the patient's condition change prior to their follow-up appointment. The family and/or caregiver verbally agrees with the care-plan and all of their questions have been answered. The discharge instructions have also been provided to the them with educational information regarding the patient's diagnosis as well a list of reasons why the patient would want to return to the ER prior to their follow-up appointment should their condition change.   Vu Bradley MD

## 2022-09-10 NOTE — ED NOTES
DC info reviewed with Patient, all questions answered. Patient well-appearing at time of discharge, vital signs stable, no acute distress. Wheeled out of ED at this time.

## 2023-01-12 ENCOUNTER — OFFICE VISIT (OUTPATIENT)
Dept: SURGERY | Age: 83
End: 2023-01-12

## 2023-01-12 VITALS
SYSTOLIC BLOOD PRESSURE: 163 MMHG | HEART RATE: 72 BPM | OXYGEN SATURATION: 98 % | TEMPERATURE: 98.2 F | WEIGHT: 191 LBS | HEIGHT: 76 IN | DIASTOLIC BLOOD PRESSURE: 91 MMHG | RESPIRATION RATE: 20 BRPM | BODY MASS INDEX: 23.26 KG/M2

## 2023-01-12 DIAGNOSIS — M79.89 CYST OF SOFT TISSUE: Primary | ICD-10-CM

## 2023-01-12 NOTE — PROGRESS NOTES
Identified pt with two pt identifiers (name and ). Reviewed chart in preparation for visit and have obtained necessary documentation. Jessica Olvera is a [de-identified] y.o. male  Chief Complaint   Patient presents with    New Patient    Cyst     Rectal cyst     There were no vitals taken for this visit. 1. Have you been to the ER, urgent care clinic since your last visit? Hospitalized since your last visit? No    2. Have you seen or consulted any other health care providers outside of the [address] [place] [address] since your last visit? Include any pap smears or colon screening. Yes Where: [facility]      Patient and provider made aware of elevated BP x2. Patient asymptomatic. Patient reminded to monitor BP, continue to take BP medications if prescribed, and follow up with PCP/Cardiologist.  Patient expressed understanding and agreement.

## 2023-01-12 NOTE — PROGRESS NOTES
Subjective:      Jhonny Lozoya is an [de-identified] y.o. male with history of Afib, s/p ablation, TIA (3 months ago) who noticed a left dorys-rectal lump a few weeks ago while showering. He denies any drainage, blood per rectum, pain with bowel movements or change in bowel habits. The lump spontaneously resolved. He denies any previous per-rectal infections or fistula's. He denies any past dorys-rectal procedures. He denies any weight loss or abdominal pain. He has no family history of colon ca. He has never had a colonoscopy, however his cologuard was negative in 2020. He has no recent history of anemia. New Patient  Pertinent negatives include no chest pain, no abdominal pain, no headaches and no shortness of breath. Cyst  Pertinent negatives include no chest pain, no abdominal pain, no headaches and no shortness of breath.       Patient Active Problem List   Diagnosis Code    Broca's aphasia R47.01    Paroxysmal atrial fibrillation (HCC) I48.0    Chronic anticoagulation W24.26    Systolic CHF, chronic (HCC) I50.22    Acquired hypothyroidism E03.9    Essential hypertension I10    TIA (transient ischemic attack) G45.9    A-fib ([address] I48.91    Pericardial tamponade I31.4    S/P ablation of atrial fibrillation Z98.890, Z86.79    Cardiac tamponade I31.4    Irregular heartbeat I49.9    Aphasia R47.01    Glaucoma H40.9    Hemiparesis of right dominant side ([address] G81.91     Patient Active Problem List    Diagnosis Date Noted    Glaucoma 09/09/2022    Hemiparesis of right dominant side ([address] 09/09/2022    Aphasia 09/08/2022    Irregular heartbeat 07/31/2018    S/P ablation of atrial fibrillation 04/22/2018    Cardiac tamponade 04/22/2018    A-fib ([address] 04/09/2018    Pericardial tamponade 04/09/2018    TIA (transient ischemic attack) 02/01/2018    Broca's aphasia 01/31/2018    Paroxysmal atrial fibrillation ([address] 01/31/2018    Chronic anticoagulation 65/96/2368    Systolic CHF, chronic ([address] 01/31/2018    Acquired hypothyroidism 01/31/2018    Essential hypertension 01/31/2018     Current Outpatient Medications   Medication Sig Dispense Refill    atorvastatin (LIPITOR) 40 mg tablet Take 1 Tablet by mouth nightly. 30 Tablet 0    bimatoprost (LUMIGAN) 0.01 % ophthalmic drops Administer 1 Drop to both eyes every evening. VIT C/E/ZN/COPPR/LUTEIN/ZEAXAN (PRESERVISION AREDS 2 PO) Take  by mouth two (2) times a day. aspirin 81 mg chewable tablet Take 1 Tab by mouth daily. levothyroxine (SYNTHROID) 75 mcg tablet Take 1 Tab by mouth Daily (before breakfast). timolol (TIMOPTIC) 0.5 % ophthalmic solution Administer 1 Drop to both eyes daily. ondansetron hcl (Zofran) 4 mg tablet Take 1 Tablet by mouth every eight (8) hours as needed for Nausea. (Patient not taking: Reported on 1/12/2023) 20 Tablet 0     Allergies   Allergen Reactions    Chocolate Flavor Other (comments)     migraines     Past Medical History:   Diagnosis Date    Atrial fibrillation (HCC)     CAD (coronary artery disease)     Cancer (HCC)     Skin- basal and squamous     Congestive heart failure ([facility]     Glaucoma     Hypertension     Hypothyroidism     Stroke [facility])      Past Surgical History:   Procedure Laterality Date    CATH PERICARDIOCENTESIS  4/22/2018         HX APPENDECTOMY  1947    HX CATARACT REMOVAL  2009 x 2    HX THYROIDECTOMY  1950 Partial,    2003 Full    [place] UNLISTED PROCEDURE CARDIAC SURGERY  04/09/2018    cardiac ablation, implant permanent cardiac monitor-[employer]tronic     History reviewed. No pertinent family history. Social History     Tobacco Use    Smoking status: Never    Smokeless tobacco: Never   Substance Use Topics    Alcohol use: No        Review of Systems  Review of Systems   Constitutional:  Negative for chills, fever, malaise/fatigue and weight loss. HENT:  Negative for congestion and sore throat. Respiratory:  Negative for cough and shortness of breath.     Cardiovascular:  Negative for chest pain, claudication and leg swelling. Gastrointestinal:  Negative for abdominal pain, blood in stool, constipation, diarrhea, heartburn, nausea and vomiting. Musculoskeletal:  Negative for back pain, joint pain and neck pain. Skin:  Negative for rash. Neurological:  Negative for weakness and headaches. Objective:     Visit Vitals  BP (!) 163/91   Pulse 72   Temp 98.2 °F (36.8 °C) (Temporal)   Resp 20   Ht 6' 4\" (1.93 m)   Wt 191 lb (86.6 kg)   SpO2 98%   BMI 23.25 kg/m²     Physical Exam  Constitutional:       General: He is not in acute distress. Appearance: Normal appearance. He is normal weight. He is not ill-appearing or toxic-appearing. HENT:      Head: Normocephalic and atraumatic. Mouth/Throat:      Mouth: Mucous membranes are moist.      Pharynx: Oropharynx is clear. Eyes:      General: No scleral icterus. Cardiovascular:      Rate and Rhythm: Rhythm irregular. Pulmonary:      Effort: Pulmonary effort is normal. No respiratory distress. Abdominal:      General: There is no distension. Palpations: Abdomen is soft. There is no mass. Tenderness: There is no abdominal tenderness. There is no guarding. Genitourinary:     Comments: No perirectal or perianal lumps, fistulas, induration, erythema, or tenderness. No fissure or skin tags. No hemorrhoids, normal sphincter tone. KATHLEEN no masses or tenderness. Musculoskeletal:         General: No swelling. Normal range of motion. Cervical back: Normal range of motion and neck supple. Lymphadenopathy:      Cervical: No cervical adenopathy. Skin:     General: Skin is warm. Coloration: Skin is not jaundiced. Neurological:      General: No focal deficit present. Mental Status: He is alert and oriented to person, place, and time. Assessment/Plan:   [de-identified]year old with a dorys-rectal lump that has resolved.      -Discussed with patient and wife clinical exam is completely normal.  No current sign's of any dorys-anal or dorys-rectal disease.    -Patient is also asymptomatic without any GI symptoms, he is past the normal age limit for screening colonoscopy. Since he had a cologuard test recently which was negative and he is currently asymptomatic I recommended repeating the cologuard test.    -I also advised patient to return to surgery clinic for evaluation if he should develop any new symptoms or notice any new dorys-rectal lumps. His exam is currently normal and I am doubtful and imaging study would provide any further information.

## 2023-03-14 ENCOUNTER — HOSPITAL ENCOUNTER (EMERGENCY)
Age: 83
Discharge: HOME | End: 2023-03-14
Attending: EMERGENCY MEDICINE
Payer: MEDICARE

## 2023-03-14 ENCOUNTER — APPOINTMENT (OUTPATIENT)
Dept: GENERAL RADIOLOGY | Age: 83
End: 2023-03-14
Attending: EMERGENCY MEDICINE
Payer: MEDICARE

## 2023-03-14 VITALS
RESPIRATION RATE: 17 BRPM | SYSTOLIC BLOOD PRESSURE: 163 MMHG | WEIGHT: 188 LBS | DIASTOLIC BLOOD PRESSURE: 104 MMHG | BODY MASS INDEX: 22.89 KG/M2 | HEIGHT: 76 IN | HEART RATE: 68 BPM | TEMPERATURE: 97.2 F | OXYGEN SATURATION: 99 %

## 2023-03-14 DIAGNOSIS — S62.524A CLOSED NONDISPLACED FRACTURE OF DISTAL PHALANX OF RIGHT THUMB, INITIAL ENCOUNTER: Primary | ICD-10-CM

## 2023-03-14 PROCEDURE — 73140 X-RAY EXAM OF FINGER(S): CPT

## 2023-03-14 PROCEDURE — 99283 EMERGENCY DEPT VISIT LOW MDM: CPT

## 2023-03-14 NOTE — ED PROVIDER NOTES
[facility] EMERGENCY DEP  EMERGENCY DEPARTMENT ENCOUNTER       Pt Name: Francisco Serrano  MRN: [id number]  [place] 1940  Date of evaluation: 3/14/2023  Provider: Mirian Fallon MD   PCP: Audrey PEARSON MD  Note Started: 7:22 PM 3/14/23     CHIEF COMPLAINT       Chief Complaint   Patient presents with    Finger Pain        HISTORY OF PRESENT ILLNESS: 1 or more elements      History From: patient, History limited by: none     Francisco Serrano is a [de-identified] y.o. male who presents with a cc of right thumb pain. Please See MDM for Additional Details of the HPI/PMH  Nursing Notes were all reviewed and agreed with or any disagreements were addressed in the HPI. REVIEW OF SYSTEMS        Positives and Pertinent negatives as per HPI. PAST HISTORY     Past Medical History:  Past Medical History:   Diagnosis Date    Atrial fibrillation ([facility]     CAD (coronary artery disease)     Cancer (HCC)     Skin- basal and squamous     Congestive heart failure ([facility] [address]     Glaucoma     Hypertension     Hypothyroidism     Stroke [facility])        Past Surgical History:  Past Surgical History:   Procedure Laterality Date    CATH PERICARDIOCENTESIS  4/22/2018         HX APPENDECTOMY  1947    HX CATARACT REMOVAL  2009 x 2    HX THYROIDECTOMY  1950 Partial,    2003 Full    [place] UNLISTED PROCEDURE CARDIAC SURGERY  04/09/2018    cardiac ablation, implant permanent cardiac monitor-[employer]tr[employer]       Family History:  No family history on file. Social History:  Social History     Tobacco Use    Smoking status: Never    Smokeless tobacco: Never   Vaping Use    Vaping Use: Never used   Substance Use Topics    Alcohol use: No    Drug use: No       Allergies:   Allergies   Allergen Reactions    Chocolate Flavor Other (comments)     migraines       CURRENT MEDICATIONS      Discharge Medication List as of 3/14/2023  4:56 PM        CONTINUE these medications which have NOT CHANGED    Details   ondansetron hcl (Zofran) 4 mg tablet Take 1 Tablet by mouth every eight (8) hours as needed for Nausea., Normal, Disp-20 Tablet, R-0      atorvastatin (LIPITOR) 40 mg tablet Take 1 Tablet by mouth nightly., Normal, Disp-30 Tablet, R-0      bimatoprost (LUMIGAN) 0.01 % ophthalmic drops Administer 1 Drop to both eyes every evening., Historical Med      VIT C/E/ZN/COPPR/LUTEIN/ZEAXAN (PRESERVISION AREDS 2 PO) Take  by mouth two (2) times a day., Historical Med      aspirin 81 mg chewable tablet Take 1 Tab by mouth daily. , OTC      levothyroxine (SYNTHROID) 75 mcg tablet Take 1 Tab by mouth Daily (before breakfast). , Historical Med      timolol (TIMOPTIC) 0.5 % ophthalmic solution Administer 1 Drop to both eyes daily. , Historical Med             SCREENINGS               No data recorded         PHYSICAL EXAM      ED Triage Vitals [03/14/23 1547]   ED Encounter Vitals Group      BP (!) 167/94      Pulse (Heart Rate) 72      Resp Rate 18      Temp 97.2 °F (36.2 °C)      Temp src       O2 Sat (%) 100 %      Weight 188 lb      Height 6' 4\"        Physical Exam  Vitals and nursing note reviewed. Constitutional:       General: He is not in acute distress. Appearance: He is well-developed. HENT:      Head: Normocephalic and atraumatic. Eyes:      Conjunctiva/sclera: Conjunctivae normal.      Pupils: Pupils are equal, round, and reactive to light. Cardiovascular:      Rate and Rhythm: Normal rate. Pulmonary:      Effort: Pulmonary effort is normal. No respiratory distress. Musculoskeletal:         General: Normal range of motion. Cervical back: Normal range of motion. Comments: R thumb with normal strength, sensation, no obvious deformity, normal cap refill   Skin:     General: Skin is warm and dry. Neurological:      Mental Status: He is alert and oriented to person, place, and time. Psychiatric:         Mood and Affect: Mood normal.        DIAGNOSTIC RESULTS   LABS:     No results found for this or any previous visit (from the past 12 hour(s)). EKG:  If performed, independent interpretation documented below in the MDM section     RADIOLOGY:  Non-plain film images such as CT, Ultrasound and MRI are read by the radiologist. Plain radiographic images are visualized and preliminarily interpreted by the ED Provider with the findings documented in the MDM section. Interpretation per the Radiologist below, if available at the time of this note:     XR THUMB RT MIN 2 V    Result Date: 3/14/2023  EXAM: XR THUMB RT MIN 2 V INDICATION: Trauma. COMPARISON: None. FINDINGS: Three views of the right thumb demonstrate an acute transverse nondisplaced fracture at the base of the distal phalanx of the thumb. There is overlying soft tissue swelling. The bones are osteopenic. Acute nondisplaced fracture of the distal phalanx of the thumb. PROCEDURES   Unless otherwise noted below, none  Procedures     CRITICAL CARE TIME   0    EMERGENCY DEPARTMENT COURSE and DIFFERENTIAL DIAGNOSIS/MDM   Vitals:    Vitals:    03/14/23 1547 03/14/23 1701   BP: (!) 167/94 (!) 163/104   Pulse: 72 68   Resp: 18 17   Temp: 97.2 °F (36.2 °C)    SpO2: 100% 99%   Weight: 85.3 kg (188 lb)    Height: 6' 4\" (1.93 m)         Patient was given the following medications:  Medications - No data to display    Medical Decision Making  [de-identified] M with a history of HTN who presents with a cc of acute right thumb pain/injury. States his son opened a trailer and it hit his thumb. Reports initially looked deformed distally but he re-aligned it. No other associated injuries. No meds PTA    Well appearing. Hypertensive (with history of) with otherwise stable VS. Ddx: strain, sprain, fx, contusion    Plan for XR to assess    Problems Addressed:  Closed nondisplaced fracture of distal phalanx of right thumb, initial encounter: acute illness or injury    Amount and/or Complexity of Data Reviewed  Radiology: ordered. Decision-making details documented in ED Course. XR with nondisplaced fx of the distally phalanx. Splint placed by RN. Remained NVI after splint placement. This is definitive care for this injury. Will d/c with PCP and ortho follow up           FINAL IMPRESSION     1. Closed nondisplaced fracture of distal phalanx of right thumb, initial encounter          DISPOSITION/PLAN   Nalini Celeste's  results have been reviewed with him. He has been counseled regarding his diagnosis, treatment, and plan. He verbally conveys understanding and agreement of the signs, symptoms, diagnosis, treatment and prognosis and additionally agrees to follow up as discussed. He also agrees with the care-plan and conveys that all of his questions have been answered. I have also provided discharge instructions for him that include: educational information regarding their diagnosis and treatment, and list of reasons why they would want to return to the ED prior to their follow-up appointment, should his condition change. CLINICAL IMPRESSION    Discharge Note: The patient is stable for discharge home. The signs, symptoms, diagnosis, and discharge instructions have been discussed, understanding conveyed, and agreed upon. The patient is to follow up as recommended or return to ER should their symptoms worsen. PATIENT REFERRED TO:  Follow-up Information       Follow up With Specialties Details Why Contact Info    Kathy Abdi MD Internal Medicine Physician Schedule an appointment as soon as possible for a visit   [facility]  [phone]                DISCHARGE MEDICATIONS:  Discharge Medication List as of 3/14/2023  4:56 PM            DISCONTINUED MEDICATIONS:  Discharge Medication List as of 3/14/2023  4:56 PM          I am the Primary Clinician of Record. Ariana Win MD (electronically signed)    (Please note that parts of this dictation were completed with voice recognition software.  Quite often unanticipated grammatical, syntax, homophones, and other interpretive errors are inadvertently transcribed by the computer software. Please disregards these errors.  Please excuse any errors that have escaped final proofreading.)

## 2023-06-07 PROBLEM — E78.00 HYPERCHOLESTEROLEMIA: Status: ACTIVE | Noted: 2023-06-07

## 2023-06-07 PROBLEM — Z95.818 STATUS POST PLACEMENT OF IMPLANTABLE LOOP RECORDER: Status: ACTIVE | Noted: 2023-06-07

## 2023-09-14 ENCOUNTER — HOSPITAL ENCOUNTER (OUTPATIENT)
Facility: HOSPITAL | Age: 83
Discharge: HOME | End: 2023-09-14
Payer: MEDICARE

## 2023-09-14 DIAGNOSIS — M25.561 RIGHT KNEE PAIN, UNSPECIFIED CHRONICITY: ICD-10-CM

## 2023-09-14 PROCEDURE — 73564 X-RAY EXAM KNEE 4 OR MORE: CPT

## 2023-11-02 ENCOUNTER — APPOINTMENT (OUTPATIENT)
Facility: HOSPITAL | Age: 83
End: 2023-11-02
Payer: MEDICARE

## 2023-11-02 ENCOUNTER — HOSPITAL ENCOUNTER (OUTPATIENT)
Facility: HOSPITAL | Age: 83
Setting detail: OBSERVATION
Discharge: HOME | End: 2023-11-03
Attending: FAMILY MEDICINE | Admitting: STUDENT IN AN ORGANIZED HEALTH CARE EDUCATION/TRAINING PROGRAM
Payer: MEDICARE

## 2023-11-02 DIAGNOSIS — G45.9 TIA (TRANSIENT ISCHEMIC ATTACK): Primary | ICD-10-CM

## 2023-11-02 LAB
ALBUMIN SERPL-MCNC: 4.4 G/DL (ref 3.5–5)
ALBUMIN/GLOB SERPL: 1.5 (ref 1.1–2.2)
ALP SERPL-CCNC: 116 U/L (ref 45–117)
ALT SERPL-CCNC: 36 U/L (ref 12–78)
ANION GAP SERPL CALC-SCNC: 4 MMOL/L (ref 5–15)
AST SERPL-CCNC: 25 U/L (ref 15–37)
BASOPHILS # BLD: 0.1 K/UL (ref 0–0.1)
BASOPHILS NFR BLD: 1 % (ref 0–1)
BILIRUB SERPL-MCNC: 0.6 MG/DL (ref 0.2–1)
BUN SERPL-MCNC: 14 MG/DL (ref 6–20)
BUN/CREAT SERPL: 21 (ref 12–20)
CALCIUM SERPL-MCNC: 8.9 MG/DL (ref 8.5–10.1)
CHLORIDE SERPL-SCNC: 98 MMOL/L (ref 97–108)
CO2 SERPL-SCNC: 35 MMOL/L (ref 21–32)
CREAT SERPL-MCNC: 0.67 MG/DL (ref 0.7–1.3)
DIFFERENTIAL METHOD BLD: NORMAL
EOSINOPHIL # BLD: 0.2 K/UL (ref 0–0.4)
EOSINOPHIL NFR BLD: 2 % (ref 0–7)
ERYTHROCYTE [DISTWIDTH] IN BLOOD BY AUTOMATED COUNT: 13 % (ref 11.5–14.5)
GLOBULIN SER CALC-MCNC: 3 G/DL (ref 2–4)
GLUCOSE BLD STRIP.AUTO-MCNC: 107 MG/DL (ref 65–117)
GLUCOSE SERPL-MCNC: 128 MG/DL (ref 65–100)
HCT VFR BLD AUTO: 45.8 % (ref 36.6–50.3)
HGB BLD-MCNC: 15.1 G/DL (ref 12.1–17)
IMM GRANULOCYTES # BLD AUTO: 0 K/UL (ref 0–0.04)
IMM GRANULOCYTES NFR BLD AUTO: 0 % (ref 0–0.5)
INR PPP: 1.1 (ref 0.9–1.1)
LYMPHOCYTES # BLD: 1.2 K/UL (ref 0.8–3.5)
LYMPHOCYTES NFR BLD: 14 % (ref 12–49)
MCH RBC QN AUTO: 28.1 PG (ref 26–34)
MCHC RBC AUTO-ENTMCNC: 33 G/DL (ref 30–36.5)
MCV RBC AUTO: 85.1 FL (ref 80–99)
MONOCYTES # BLD: 0.8 K/UL (ref 0–1)
MONOCYTES NFR BLD: 9 % (ref 5–13)
NEUTS SEG # BLD: 6.4 K/UL (ref 1.8–8)
NEUTS SEG NFR BLD: 74 % (ref 32–75)
NRBC # BLD: 0 K/UL (ref 0–0.01)
NRBC BLD-RTO: 0 PER 100 WBC
PLATELET # BLD AUTO: 185 K/UL (ref 150–400)
PMV BLD AUTO: 10.2 FL (ref 8.9–12.9)
POTASSIUM SERPL-SCNC: 3.7 MMOL/L (ref 3.5–5.1)
PROT SERPL-MCNC: 7.4 G/DL (ref 6.4–8.2)
PROTHROMBIN TIME: 11.2 SEC (ref 9–11.1)
RBC # BLD AUTO: 5.38 M/UL (ref 4.1–5.7)
SERVICE CMNT-IMP: NORMAL
SODIUM SERPL-SCNC: 137 MMOL/L (ref 136–145)
TROPONIN I SERPL HS-MCNC: 7 NG/L (ref 0–76)
WBC # BLD AUTO: 8.6 K/UL (ref 4.1–11.1)

## 2023-11-02 PROCEDURE — 36415 COLL VENOUS BLD VENIPUNCTURE: CPT

## 2023-11-02 PROCEDURE — 85610 PROTHROMBIN TIME: CPT

## 2023-11-02 PROCEDURE — 80053 COMPREHEN METABOLIC PANEL: CPT

## 2023-11-02 PROCEDURE — 70498 CT ANGIOGRAPHY NECK: CPT

## 2023-11-02 PROCEDURE — 85025 COMPLETE CBC W/AUTO DIFF WBC: CPT

## 2023-11-02 PROCEDURE — 99285 EMERGENCY DEPT VISIT HI MDM: CPT

## 2023-11-02 PROCEDURE — 93005 ELECTROCARDIOGRAM TRACING: CPT | Performed by: FAMILY MEDICINE

## 2023-11-02 PROCEDURE — 70450 CT HEAD/BRAIN W/O DYE: CPT

## 2023-11-02 PROCEDURE — 82962 GLUCOSE BLOOD TEST: CPT

## 2023-11-02 PROCEDURE — 6360000004 HC RX CONTRAST MEDICATION: Performed by: FAMILY MEDICINE

## 2023-11-02 PROCEDURE — 84484 ASSAY OF TROPONIN QUANT: CPT

## 2023-11-02 PROCEDURE — 71045 X-RAY EXAM CHEST 1 VIEW: CPT

## 2023-11-02 RX ADMIN — IOPAMIDOL 100 ML: 755 INJECTION, SOLUTION INTRAVENOUS at 23:09

## 2023-11-02 ASSESSMENT — PAIN - FUNCTIONAL ASSESSMENT: PAIN_FUNCTIONAL_ASSESSMENT: NONE - DENIES PAIN

## 2023-11-03 ENCOUNTER — APPOINTMENT (OUTPATIENT)
Facility: HOSPITAL | Age: 83
End: 2023-11-03
Payer: MEDICARE

## 2023-11-03 VITALS
WEIGHT: 187.9 LBS | RESPIRATION RATE: 20 BRPM | DIASTOLIC BLOOD PRESSURE: 63 MMHG | OXYGEN SATURATION: 98 % | HEIGHT: 74 IN | TEMPERATURE: 97.5 F | SYSTOLIC BLOOD PRESSURE: 127 MMHG | BODY MASS INDEX: 24.11 KG/M2 | HEART RATE: 85 BPM

## 2023-11-03 PROBLEM — I48.0 PAROXYSMAL ATRIAL FIBRILLATION (HCC): Status: ACTIVE | Noted: 2018-01-31

## 2023-11-03 PROBLEM — Z98.890 S/P ABLATION OF ATRIAL FIBRILLATION: Status: ACTIVE | Noted: 2018-04-22

## 2023-11-03 PROBLEM — R47.02 DYSPHASIA: Status: ACTIVE | Noted: 2023-11-03

## 2023-11-03 PROBLEM — E03.9 ACQUIRED HYPOTHYROIDISM: Status: ACTIVE | Noted: 2018-01-31

## 2023-11-03 PROBLEM — H40.9 GLAUCOMA: Status: ACTIVE | Noted: 2022-09-09

## 2023-11-03 PROBLEM — Z86.79 S/P ABLATION OF ATRIAL FIBRILLATION: Status: ACTIVE | Noted: 2018-04-22

## 2023-11-03 PROBLEM — G45.9 TIA (TRANSIENT ISCHEMIC ATTACK): Status: ACTIVE | Noted: 2018-02-01

## 2023-11-03 LAB
CHOLEST SERPL-MCNC: 91 MG/DL
ERYTHROCYTE [DISTWIDTH] IN BLOOD BY AUTOMATED COUNT: 12.9 % (ref 11.5–14.5)
EST. AVERAGE GLUCOSE BLD GHB EST-MCNC: 126 MG/DL
GLUCOSE BLD STRIP.AUTO-MCNC: 111 MG/DL (ref 65–117)
HBA1C MFR BLD: 6 % (ref 4–5.6)
HCT VFR BLD AUTO: 42 % (ref 36.6–50.3)
HDLC SERPL-MCNC: 47 MG/DL
HDLC SERPL: 1.9 (ref 0–5)
HGB BLD-MCNC: 14.4 G/DL (ref 12.1–17)
LDLC SERPL CALC-MCNC: 40.6 MG/DL (ref 0–100)
MCH RBC QN AUTO: 28.6 PG (ref 26–34)
MCHC RBC AUTO-ENTMCNC: 34.3 G/DL (ref 30–36.5)
MCV RBC AUTO: 83.3 FL (ref 80–99)
NRBC # BLD: 0 K/UL (ref 0–0.01)
NRBC BLD-RTO: 0 PER 100 WBC
PLATELET # BLD AUTO: 196 K/UL (ref 150–400)
PMV BLD AUTO: 10.4 FL (ref 8.9–12.9)
RBC # BLD AUTO: 5.04 M/UL (ref 4.1–5.7)
SERVICE CMNT-IMP: NORMAL
TRIGL SERPL-MCNC: 17 MG/DL
TSH SERPL DL<=0.05 MIU/L-ACNC: 0.31 UIU/ML (ref 0.36–3.74)
VLDLC SERPL CALC-MCNC: 3.4 MG/DL
WBC # BLD AUTO: 10.4 K/UL (ref 4.1–11.1)

## 2023-11-03 PROCEDURE — G0378 HOSPITAL OBSERVATION PER HR: HCPCS

## 2023-11-03 PROCEDURE — 36415 COLL VENOUS BLD VENIPUNCTURE: CPT

## 2023-11-03 PROCEDURE — 97161 PT EVAL LOW COMPLEX 20 MIN: CPT

## 2023-11-03 PROCEDURE — 83036 HEMOGLOBIN GLYCOSYLATED A1C: CPT

## 2023-11-03 PROCEDURE — 99222 1ST HOSP IP/OBS MODERATE 55: CPT | Performed by: INTERNAL MEDICINE

## 2023-11-03 PROCEDURE — 82962 GLUCOSE BLOOD TEST: CPT

## 2023-11-03 PROCEDURE — 97110 THERAPEUTIC EXERCISES: CPT

## 2023-11-03 PROCEDURE — 92523 SPEECH SOUND LANG COMPREHEN: CPT

## 2023-11-03 PROCEDURE — 70551 MRI BRAIN STEM W/O DYE: CPT

## 2023-11-03 PROCEDURE — 97165 OT EVAL LOW COMPLEX 30 MIN: CPT

## 2023-11-03 PROCEDURE — 6370000000 HC RX 637 (ALT 250 FOR IP): Performed by: STUDENT IN AN ORGANIZED HEALTH CARE EDUCATION/TRAINING PROGRAM

## 2023-11-03 PROCEDURE — 80061 LIPID PANEL: CPT

## 2023-11-03 PROCEDURE — 85027 COMPLETE CBC AUTOMATED: CPT

## 2023-11-03 PROCEDURE — 84443 ASSAY THYROID STIM HORMONE: CPT

## 2023-11-03 PROCEDURE — 93306 TTE W/DOPPLER COMPLETE: CPT

## 2023-11-03 PROCEDURE — 2580000003 HC RX 258: Performed by: STUDENT IN AN ORGANIZED HEALTH CARE EDUCATION/TRAINING PROGRAM

## 2023-11-03 PROCEDURE — 92610 EVALUATE SWALLOWING FUNCTION: CPT

## 2023-11-03 RX ORDER — PROCHLORPERAZINE EDISYLATE 5 MG/ML
10 INJECTION INTRAMUSCULAR; INTRAVENOUS EVERY 6 HOURS PRN
Status: CANCELLED | OUTPATIENT
Start: 2023-11-03

## 2023-11-03 RX ORDER — LEVOTHYROXINE SODIUM 0.07 MG/1
75 TABLET ORAL
Status: DISCONTINUED | OUTPATIENT
Start: 2023-11-03 | End: 2023-11-03 | Stop reason: HOSPADM

## 2023-11-03 RX ORDER — ATORVASTATIN CALCIUM 20 MG/1
40 TABLET, FILM COATED ORAL NIGHTLY
Status: CANCELLED | OUTPATIENT
Start: 2023-11-03

## 2023-11-03 RX ORDER — ASPIRIN 81 MG/1
81 TABLET, CHEWABLE ORAL DAILY
Qty: 30 TABLET | Refills: 3 | Status: SHIPPED | COMMUNITY
Start: 2023-11-04

## 2023-11-03 RX ORDER — TRIAMCINOLONE ACETONIDE 0.25 MG/ML
LOTION TOPICAL 2 TIMES DAILY
Status: ON HOLD | COMMUNITY
End: 2023-11-03 | Stop reason: ALTCHOICE

## 2023-11-03 RX ORDER — ASPIRIN 81 MG/1
81 TABLET, CHEWABLE ORAL DAILY
Status: DISCONTINUED | OUTPATIENT
Start: 2023-11-03 | End: 2023-11-03 | Stop reason: HOSPADM

## 2023-11-03 RX ORDER — ATORVASTATIN CALCIUM 40 MG/1
40 TABLET, FILM COATED ORAL NIGHTLY
Status: DISCONTINUED | OUTPATIENT
Start: 2023-11-03 | End: 2023-11-03 | Stop reason: HOSPADM

## 2023-11-03 RX ORDER — BETAMETHASONE DIPROPIONATE 0.5 MG/G
CREAM TOPICAL 2 TIMES DAILY
Status: ON HOLD | COMMUNITY
End: 2023-11-03 | Stop reason: ALTCHOICE

## 2023-11-03 RX ORDER — SODIUM CHLORIDE 0.9 % (FLUSH) 0.9 %
5-40 SYRINGE (ML) INJECTION PRN
Status: DISCONTINUED | OUTPATIENT
Start: 2023-11-03 | End: 2023-11-03 | Stop reason: HOSPADM

## 2023-11-03 RX ORDER — LEVOTHYROXINE SODIUM 0.07 MG/1
75 TABLET ORAL
Status: CANCELLED | OUTPATIENT
Start: 2023-11-03

## 2023-11-03 RX ORDER — SODIUM CHLORIDE 0.9 % (FLUSH) 0.9 %
5-40 SYRINGE (ML) INJECTION EVERY 12 HOURS SCHEDULED
Status: CANCELLED | OUTPATIENT
Start: 2023-11-03

## 2023-11-03 RX ORDER — LABETALOL HYDROCHLORIDE 5 MG/ML
10 INJECTION, SOLUTION INTRAVENOUS EVERY 10 MIN PRN
Status: CANCELLED | OUTPATIENT
Start: 2023-11-03

## 2023-11-03 RX ORDER — BRIMONIDINE TARTRATE 1 MG/ML
1 SOLUTION/ DROPS OPHTHALMIC
COMMUNITY

## 2023-11-03 RX ORDER — SODIUM CHLORIDE 0.9 % (FLUSH) 0.9 %
5-40 SYRINGE (ML) INJECTION PRN
Status: CANCELLED | OUTPATIENT
Start: 2023-11-03

## 2023-11-03 RX ORDER — SODIUM CHLORIDE 0.9 % (FLUSH) 0.9 %
5-40 SYRINGE (ML) INJECTION EVERY 12 HOURS SCHEDULED
Status: DISCONTINUED | OUTPATIENT
Start: 2023-11-03 | End: 2023-11-03 | Stop reason: HOSPADM

## 2023-11-03 RX ORDER — SODIUM CHLORIDE 9 MG/ML
INJECTION, SOLUTION INTRAVENOUS PRN
Status: CANCELLED | OUTPATIENT
Start: 2023-11-03

## 2023-11-03 RX ORDER — POLYETHYLENE GLYCOL 3350 17 G/17G
17 POWDER, FOR SOLUTION ORAL DAILY PRN
Status: DISCONTINUED | OUTPATIENT
Start: 2023-11-03 | End: 2023-11-03 | Stop reason: HOSPADM

## 2023-11-03 RX ORDER — TIMOLOL MALEATE 5 MG/ML
1 SOLUTION/ DROPS OPHTHALMIC 2 TIMES DAILY
Status: ON HOLD | COMMUNITY
End: 2023-11-03 | Stop reason: ALTCHOICE

## 2023-11-03 RX ORDER — SODIUM CHLORIDE 9 MG/ML
INJECTION, SOLUTION INTRAVENOUS PRN
Status: DISCONTINUED | OUTPATIENT
Start: 2023-11-03 | End: 2023-11-03 | Stop reason: HOSPADM

## 2023-11-03 RX ORDER — ASPIRIN 81 MG/1
81 TABLET, CHEWABLE ORAL DAILY
Status: CANCELLED | OUTPATIENT
Start: 2023-11-03

## 2023-11-03 RX ORDER — LABETALOL HYDROCHLORIDE 5 MG/ML
10 INJECTION, SOLUTION INTRAVENOUS EVERY 10 MIN PRN
Status: DISCONTINUED | OUTPATIENT
Start: 2023-11-03 | End: 2023-11-03 | Stop reason: HOSPADM

## 2023-11-03 RX ORDER — POLYETHYLENE GLYCOL 3350 17 G/17G
17 POWDER, FOR SOLUTION ORAL DAILY PRN
Status: CANCELLED | OUTPATIENT
Start: 2023-11-03

## 2023-11-03 RX ORDER — PROCHLORPERAZINE EDISYLATE 5 MG/ML
10 INJECTION INTRAMUSCULAR; INTRAVENOUS EVERY 6 HOURS PRN
Status: DISCONTINUED | OUTPATIENT
Start: 2023-11-03 | End: 2023-11-03 | Stop reason: HOSPADM

## 2023-11-03 RX ORDER — CLOPIDOGREL BISULFATE 75 MG/1
75 TABLET ORAL DAILY
Qty: 21 TABLET | Refills: 0 | Status: SHIPPED | OUTPATIENT
Start: 2023-11-03 | End: 2023-11-24

## 2023-11-03 RX ADMIN — RIVAROXABAN 20 MG: 20 TABLET, FILM COATED ORAL at 18:49

## 2023-11-03 RX ADMIN — ASPIRIN 81 MG: 81 TABLET, CHEWABLE ORAL at 08:17

## 2023-11-03 RX ADMIN — RIVAROXABAN 20 MG: 20 TABLET, FILM COATED ORAL at 02:26

## 2023-11-03 RX ADMIN — LEVOTHYROXINE SODIUM 75 MCG: 0.07 TABLET ORAL at 06:38

## 2023-11-03 RX ADMIN — SODIUM CHLORIDE, PRESERVATIVE FREE 10 ML: 5 INJECTION INTRAVENOUS at 08:17

## 2023-11-03 ASSESSMENT — PAIN SCALES - GENERAL: PAINLEVEL_OUTOF10: 0

## 2023-11-03 NOTE — ASSESSMENT & PLAN NOTE
S/p ablation  Off AC since. Reviewed recent cardiology note and they were considering restarting Xarelto 2/2 TIA's and considering a loop recorder. We will restart AC  Follow up with cardiology and consider loop recorder.

## 2023-11-03 NOTE — CARE COORDINATION
Care Management Initial Assessment       RUR:n/a in obs   Readmission? No  1st IM letter given? No  1st  letter given: No         11/03/23 1514   Service Assessment   Patient Orientation Alert and Oriented   Cognition Alert   History Provided By Patient   Primary Caregiver Self   Support Systems Spouse/Significant Other   Patient's Healthcare Decision Maker is: Legal Next of [address]   PCP Verified by CM Yes  (Payal Goldsmith MD)   Last Visit to PCP Within last 3 months   Prior Functional Level Independent in ADLs/IADLs   Current Functional Level Assistance with the following:;Mobility   Can patient return to prior living arrangement Unknown at present   Ability to make needs known: Good   Family able to assist with home care needs: Yes   Would you like for me to discuss the discharge plan with any other family members/significant others, and if so, who? Yes  (Wife)   Financial Resources Medicare   Social/Functional History   Lives With Spouse  (Simultaneous filing. User may not have seen previous data.)   Type of Home House  (Simultaneous filing. User may not have seen previous data.)   Home Layout Two level;Bed/Bath upstairs   Home Access Stairs to enter with rails   Bathroom Shower/Tub Tub/Shower unit   [employer] Handicap height   Bathroom Equipment Grab bars in shower   Home Equipment None  (Simultaneous filing. User may not have seen previous data.)   Receives Help From [facility]   ADL Assistance Independent   Ambulation Assistance Independent   Transfer Assistance Independent   Active  Yes  (Simultaneous filing. 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 Ian Kohler lives at home with his spouse Aliza Saenz. He states he is normally able to care for himself. Does NOT use any DME.

## 2023-11-03 NOTE — PROGRESS NOTES
Speech LAnguage Pathology EVALUATION/DISCHARGE    Patient: Scot Kehr (62 y.o. male)  Date: 11/3/2023  Primary Diagnosis: TIA (transient ischemic attack) [G45.9]       Precautions: Fall Risk, General Precautions, Bed Alarm                  Scot Kehr is a 82 y.o. male who was admitted for Aphasia and Altered Mental Status  Patient was evaluated through a synchronous (real-time) audio-video encounter 11/3/2023. The patient (or guardian if applicable) is aware that this is a billable service. Verbal consent to proceed has been obtained. The visit was conducted pursuant to the emergency declaration under the [address] and the [employer] and [employer] General Act. Patient identification was verified, and a caregiver was present when appropriate. 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 Note MRI revealed \"No acute findings no mass. Mild atrophy and nonspecific white matter changes. \" Note patient seen by SLP prior in 9/2022, which revealed moderate to moderately severe mixed aphasia. No family present at bedside during SLP telehealth visit. Patient presents with a functional oral/pharyngeal phase. Per discussion with RN, prior to SLP visit, patient with good tolerance of regular diet. Patient demonstrated functional mastication, timely posterior propulsion, and efficient oral clearance. Patient tolerated single and sequential sips of thin liquid via cup and straw without overt difficulty or overt signs of aspiration. Note CXR showed \"no acute intrathoracic process. \"    Patient presents with moderate mixed/non-fluent aphasia, [place] UNLISTED PROCEDURE CARDIAC SURGERY  04/09/2018    cardiac ablation, implant permanent cardiac monitor-[employer]tronic    THYROIDECTOMY  1950 Partial,    2003 Full     Prior Level of Function/Home Situation:   Social/Functional History  Lives With: Spouse  Type of Home: House  Home Layout: Two level, Bed/Bath upstairs  Home Access: Stairs to enter with rails  Entrance Stairs - Number of Steps: 5 JEFF and then full flight up to bed and bath on 2nd floor w/ B HR  Entrance Stairs - Rails: Both (but wide)  Bathroom Shower/Tub: Tub/Shower unit  Bathroom Toilet: Handicap height  Bathroom Equipment: Grab bars in shower  Home Equipment: None  Has the patient had two or more falls in the past year or any fall with injury in the past year?: No  Receives Help From: Family  ADL Assistance: Independent  Ambulation Assistance: Independent  Transfer Assistance: Independent  Active : Yes  Mode of Transportation: Car  Occupation: Other(comment)  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 No prior dysphagia eval or tx notes located in patient's chart. Patient Complaint: None stated. Cognitive and Communication Status:  Neurologic State: Alert  Orientation Level: Oriented to person, Oriented to place, and Oriented to time  Cognition: Decreased command following    Dysphagia:  Oral Assessment:  Oral Motor   Labial: No impairment  Dentition: Natural  Oral Hygiene: Moist;Clean  Lingual: No impairment  Velum: Unable to visualize  Mandible: No impairment  P.O.  Trials:  PO Trials  Assessment Method(s): Observation  Patient Position: Upright in bed  Vocal Quality: No Impairment  Consistency Presented: Thin;Regular  How Presented: Self-fed/presented;Cup/sip;Straw;Successive Swallows  Bolus Acceptance: No impairment  Bolus Formation/Control: No

## 2023-11-03 NOTE — PLAN OF CARE
Problem: Physical Therapy - Adult  Goal: By Discharge: Performs mobility at highest level of function for planned discharge setting. See evaluation for individualized goals. Description: FUNCTIONAL STATUS PRIOR TO ADMISSION: Patient was independent and active without use of DME.    HOME SUPPORT PRIOR TO ADMISSION: The patient lived with spouse but did not require assistance for mobility tasks or BADLs; they share IADLs; pt regularly drives. Physical Therapy Goals  Initiated 11/3/2023  1. Patient will move from supine to sit and sit to supine in bed with independence within 7 day(s). 2.  Patient will perform sit to stand with modified independence within 7 day(s). 3.  Patient will transfer from bed to chair and chair to bed with modified independence using the least restrictive device within 7 day(s). 4.  Patient will ambulate with SBA for 500 feet with the least restrictive device within 7 day(s). 5.  Patient will ascend/descend 5 stairs with 1-2 handrail(s) with SBA within 7 day(s). Outcome: Progressing   PHYSICAL THERAPY EVALUATION    Patient: Michelle Agosto (15 y.o. male)  Date: 11/3/2023  Primary Diagnosis: TIA (transient ischemic attack) [G45.9]       Precautions: Fall Risk, General Precautions, Other (comment) (expressive aphasia and word finding difficulties are main issue)                    ASSESSMENT :   DEFICITS/IMPAIRMENTS:   The patient is limited by decreased functional mobility, independence in ADLs, ROM, strength, body mechanics, activity tolerance, endurance, safety awareness, coordination, balance, proprioception, posture     Based on the impairments listed above patient will benefit from skilled intervention to address the above impairments. Pt presented laying in the bed; awake, alert, pleasant and cooperative. His challenges w/ expressive aphasia and word finding are apparently gradually improving but still an issue currently; (-) MRI.  Vitals taken once pt transferred to EOB; [Negative] : Heme/Lymph

## 2023-11-03 NOTE — ACP (ADVANCE CARE PLANNING)
Advance Care Planning     General Advance Care Planning (ACP) Conversation    Date of Conversation: 11/2/2023  Conducted with: Patient with Decision Making Capacity    Healthcare Decision Maker:    Primary Decision Maker: Kenia Harden - Spouse - [phone]  Click here to complete Healthcare Decision Makers including selection of the Healthcare Decision Maker Relationship (ie \"Primary\"). Today we documented Decision Maker(s) consistent with Legal Next of Kin hierarchy. Content/Action Overview:   Has ACP document(s) on file - reflects the patient's care preferences  Reviewed DNR/DNI and patient elects Full Code (Attempt Resuscitation)  treatment goals  N/a    Length of Voluntary ACP Conversation in minutes:  <16 minutes (Non-Billable)    Rubén Hernandez

## 2023-11-03 NOTE — ASSESSMENT & PLAN NOTE
CT head without contrast and CTA head and neck with contrast: no acute intracranial process. No aneurysm, dissection, or hemodynamically significant stenosis demonstrated. Continue home ASA and statin.  Consider plavix  Restart home xarelto   MRI brain without contrast in a.m.  2D echo if not done recently and consider Holter monitor  Check lipid panel and A1C  Neuro checks q4 hours, bedside swallow evaluation, SLP consult, PT/OT consult, and fall precautions  Permissive hypertension unless BP >220/120 or pt symptomatic  Avoid abrupt drops in BP, will give IV fluid to allow SPB between 140 to 170 to improve cerebral perfusion while in the hospital   [facility] neurology

## 2023-11-03 NOTE — PROGRESS NOTES
0730: report given to this nurse from [address] [place] [address] Sr 62: pt escorted in wheelchair to [facility]  MICHELET Clemente RN    4171: pt returns from [facility]  MICHELET Clemente RN    [phone]: pt in [facility], MD Nancie Arteaga made aware, no new orders at this time, continue to monitor pt  Gabrielle Lomas RN    [phone]: MD Nancie Arteaga made aware pt had a increase heart rate of 140, MD makes this nurse aware will look into the matter  A Chyna MELENDEZ    [phone]: MD Nancie Arteaga makes pt and spouse aware d/c pending  Gabrielle Lomas RN

## 2023-11-03 NOTE — H&P
V2.0  History and Physical      Name:  Joanne Yung /Age/Sex: 1940  (82 y.o. male)   MRN & CSN:  [id number] & [id number] Encounter Date/Time: 23 12:20 AM   Location:  ED03/03 PCP: Garcia GRAY MD       Hospital Day: 2    Assessment and Plan:   Joanne Yung is a 82 y.o. male with a pmh as below who presents with TIA (transient ischemic attack)    Hospital Problems             Last Modified POA    * (Principal) TIA (transient ischemic attack) 11/3/2023 Yes    Paroxysmal atrial fibrillation ([address]) 11/3/2023 Yes    S/P ablation of atrial fibrillation 11/3/2023 Yes    Acquired hypothyroidism 11/3/2023 Yes       Plan:  TIA (transient ischemic attack)  CT head without contrast and CTA head and neck with contrast: no acute intracranial process. No aneurysm, dissection, or hemodynamically significant stenosis demonstrated. Continue home ASA and statin. Consider plavix  Restart home xarelto   MRI brain without contrast in a.m.  2D echo if not done recently and consider Holter monitor  Check lipid panel and A1C  Neuro checks q4 hours, bedside swallow evaluation, SLP consult, PT/OT consult, and fall precautions  Permissive hypertension unless BP >220/120 or pt symptomatic  Avoid abrupt drops in BP, will give IV fluid to allow SPB between 140 to 170 to improve cerebral perfusion while in the hospital   Consulted neurology    Paroxysmal atrial fibrillation [facility])  S/p ablation  Off AC since. Reviewed recent cardiology note and they were considering restarting Xarelto 2/2 TIA's and considering a loop recorder. We will restart AC  Follow up with cardiology and consider loop recorder.      S/P ablation of atrial fibrillation  As above    Acquired hypothyroidism  Continue home medication      Disposition:   Current Living situation: home  Expected Disposition: home  Estimated D/C: 1 day    Diet Diet NPO   DVT Prophylaxis [] Lovenox, []  Heparin, [] SCDs, [] Ambulation,  [] Eliquis, [x] Xarelto, [] Coumadin   Code 01:45 AM    GLUCOSEU Negative 09/10/2022 01:45 AM    KETUA 15 09/10/2022 01:45 AM       Imaging/Diagnostics Last 24 Hours     XR CHEST PORTABLE   Final Result   No acute intrathoracic process is identified. CTA HEAD NECK W CONTRAST   Final Result   There is no major vessel occlusion. There is no aneurysm, dissection or hemodynamically significant stenosis   demonstrated. .   There is no intracranial mass, hemorrhage or evidence of acute infarction. Mild to moderate chronic microvascular ischemic change. No acute intracranial process is identified. .       CT HEAD WO CONTRAST   Final Result   No acute intracranial process. There is no intracranial mass or hemorrhage.              Electronically signed by Damari Sy DO on 11/3/2023 at 12:19 AM

## 2023-11-03 NOTE — ED NOTES
Admission SBAR Note  Situation/Background:     Patient is being transferred to [facility] surg ([address] [place] [address]), Room# 80    Patient's Chief Complaint was confusion and is admitted for tia. CODE STATUS: Full  CSSRS: 0 - No Risk    ISOLATION/PRECAUTIONS: No  ISOLATION TYPE:     Is this a behavioral health patient? No  Has wanding been completed No  Are belongings secure? No    Called outstanding consults: No    STAT labs collected: No    Repeat Lactic Acid DUE? No  TIME DUE: n/a    All STAT orders are complete: No    The following personal items will be sent with the patient during transfer to the floor: All valuables: none       ASSESSMENT:    NEURO:   NIH SCORE: 0,1-4,5-15,15-20,21-42: 2   SONYA SWALLOW SCREEN COMPLETE: No  ORIENTATION LEVEL: ORIENTATION LEVEL: Person and Place  Cognition:  appropriate decision making  Speech: shows no evidence of impairment    Is patient impulsive? No  Is patient oriented? Yes  Do they follow commands? Yes  Is the patient ambulatory? Yes    FALL RISK? Yes  Interventions: Implemented/recommended use of non-skid footwear    RESPIRATORY:   Is patient on oxygen? No  Oxygen therapy: room air  O2 rate:     CARDIAC:   Is cardiac monitoring ordered? No    Last Rhythm: Rhythm including paccardio:   Patient to transfer with tele box on? Yes  Infusions: Meds; iv fluids: none  LINE ACCESS: 20G Peripheral IV , Antecubital , Iv rate: heplock       /GI:   Continent Bowel/Bladder? Yes  Urinary Output:   Chronic or Acute: If Chronic, is it 1days old, was it changed prior to specimen collection? No  Was UA with reflex sent to lab? Yes  If no, collect and send prior to transport to inpatient area. INTEGUMENTARY:  IS THE PATIENT UNDRESSED? No  ARE THERE WOUNDS PRESENT? No  ARE THE WOUNDS DOCUMENTED? No    RESTRAINTS IN USE: No    IS THE DOCUMENTATION COMPLETE? No  Is there a current order? No  When does it ?         Vital

## 2023-11-03 NOTE — ED PROVIDER NOTES
[facility]       Pt Name: Ayesha Munoz  MRN: [id number]  [address] 1940  Date of evaluation: 11/2/2023  Provider: Lianne Larsen MD   PCP: August GRAY MD  Note Started: 12:08 AM EDT 11/3/23     CHIEF COMPLAINT       Chief Complaint   Patient presents with    Aphasia    Altered Mental Status        HISTORY OF PRESENT ILLNESS: 1 or more elements      History From: Patient and Patient's Wife  HPI Limitations: Other (Expressive aphasia)     Ayesha Munoz is a 82 y.o. male who was brought to the ED 4 hours after the onset of generalized confusion, an expressive aphasia, and apparent vision problem. This same constellation of symptoms the wife reports he has had multiple times over the last 18 years, usually resulting with resolution of his symptoms within 24 hours. Nursing Notes were all reviewed and agreed with or any disagreements were addressed in the HPI. REVIEW OF SYSTEMS      Review of Systems     Positives and Pertinent negatives as per HPI. PAST HISTORY     Past Medical History:  Past Medical History:   Diagnosis Date    Atrial fibrillation ([address])     CAD (coronary artery disease)     Cancer ([address])     Skin- basal and squamous     Congestive heart failure ([address])     Glaucoma     Hypercholesterolemia 6/7/2023    Hypertension     Hypothyroidism     Stroke [facility])          Past Surgical History:  Past Surgical History:   Procedure Laterality Date    APPENDECTOMY  1947    CATARACT REMOVAL  2009 x 2    CATH PERICARDIOCENTESIS  4/22/2018         TN UNLISTED PROCEDURE CARDIAC SURGERY  04/09/2018    cardiac ablation, implant permanent cardiac monitor-[employer]    THYROIDECTOMY  1950 Partial,    2003 Full       Family History:  No family history on file. Social History:  Social History     Tobacco Use    Smoking status: Never    Smokeless tobacco: Never   Substance Use Topics    Alcohol use: No    Drug use: No       Allergies:   Allergies   Allergen Reactions    Chocolate expressive aphasia. Right side neglect, upper>lower extremity.               DIAGNOSTIC RESULTS   LABS:     Recent Results (from the past 24 hour(s))   POCT Glucose    Collection Time: 11/02/23 10:29 PM   Result Value Ref Range    POC Glucose 107 65 - 117 mg/dL    Performed by: Hali Mcdonald PCT    EKG 12 Lead    Collection Time: 11/02/23 10:34 PM   Result Value Ref Range    Ventricular Rate 94 BPM    Atrial Rate 94 BPM    P-R Interval 192 ms    QRS Duration 94 ms    Q-T Interval 388 ms    QTc Calculation (Bazett) 485 ms    P Axis 68 degrees    R Axis 40 degrees    T Axis 75 degrees    Diagnosis       Sinus rhythm with premature atrial complexes with aberrant conduction  ST & T wave abnormality, consider lateral ischemia  Abnormal ECG  When compared with ECG of 10-SEP-2022 00:25,  premature ventricular complexes are no longer present  aberrant conduction is now present     CBC with Auto Differential    Collection Time: 11/02/23 10:35 PM   Result Value Ref Range    WBC 8.6 4.1 - 11.1 K/uL    RBC 5.38 4.10 - 5.70 M/uL    Hemoglobin 15.1 12.1 - 17.0 g/dL    Hematocrit 45.8 36.6 - 50.3 %    MCV 85.1 80.0 - 99.0 FL    MCH 28.1 26.0 - 34.0 PG    MCHC 33.0 30.0 - 36.5 g/dL    RDW 13.0 11.5 - 14.5 %    Platelets 008 251 - 693 K/uL    MPV 10.2 8.9 - 12.9 FL    Nucleated RBCs 0.0 0  WBC    nRBC 0.00 0.00 - 0.01 K/uL    Neutrophils % 74 32 - 75 %    Lymphocytes % 14 12 - 49 %    Monocytes % 9 5 - 13 %    Eosinophils % 2 0 - 7 %    Basophils % 1 0 - 1 %    Immature Granulocytes 0 0.0 - 0.5 %    Neutrophils Absolute 6.4 1.8 - 8.0 K/UL    Lymphocytes Absolute 1.2 0.8 - 3.5 K/UL    Monocytes Absolute 0.8 0.0 - 1.0 K/UL    Eosinophils Absolute 0.2 0.0 - 0.4 K/UL    Basophils Absolute 0.1 0.0 - 0.1 K/UL    Absolute Immature Granulocyte 0.0 0.00 - 0.04 K/UL    Differential Type AUTOMATED     Comprehensive Metabolic Panel    Collection Time: 11/02/23 10:35 PM   Result Value Ref Range    Sodium 137 136 - 145 mmol/L

## 2023-11-03 NOTE — DISCHARGE INSTRUCTIONS
HOSPITALIST RECOMMENDATIONS    Should not be driving for now  Begin Aspirin 81mg daily, and add Plavix 75mg daily for 3 weeks  Cont Lipitor, Synthroid as before admission  See Dr. Chance Roman in next week, need Event Monitor to assess for recurrent Atrial Fibrillation (need for anticoagulation)  Contact Neurology to see when the OV appt and EEG are scheduled    MECHE Duggan, [address]

## 2023-11-03 NOTE — ED NOTES
Ambulatory from triage to rm 3.  Per wife pt became confused and had difficulty speaking at approx [address]  11/02/23 2825

## 2023-11-03 NOTE — PLAN OF CARE
Problem: Safety - Adult  Goal: Free from fall injury  11/3/2023 1051 by Rui Weller RN  Outcome: Progressing  11/3/2023 0242 by Gladys Polo RN  Outcome: Progressing  Flowsheets (Taken 11/3/2023 0240)  Free From Fall Injury: Instruct family/caregiver on patient safety     Problem: ABCDS Injury Assessment  Goal: Absence of physical injury  11/3/2023 1051 by Rui Weller RN  Outcome: Progressing  11/3/2023 0242 by Gladys Polo RN  Outcome: Progressing

## 2023-11-03 NOTE — PROGRESS NOTES
OTR attempted to see patient and he was getting an ECHO. Will not be available again until later this afternoon to re-attempt evaluation.     Blanca Bill MS, OTR/L

## 2023-11-03 NOTE — DISCHARGE SUMMARY
[facility]  Hospitalist Discharge Summary    Patient ID:  Cris Wall  [id number]  82 y.o.  1940    PCP on record: Payal Goldsmith MD    Admit date: 11/2/2023  Discharge date and time: 11/3/2023     DISCHARGE DIAGNOSIS:    Principal Problem:    Dysphasia  Active Problems:    History of atrial fibrillation    Hypercholesterolemia    Acquired hypothyroidism    Glaucoma    CONSULTATIONS:  IP CONSULT TO NEUROLOGY  IP CONSULT TO NEUROLOGY    Excerpted HPI from H&P of Yanet Medrano DO:  Cris Wall is a 82 y.o. male with pmh as below who presents with confusion and dysphagia. Onset of symptoms was at 1830 on 11/2/2023. Symptoms are currently of moderate severity. Associated symptoms include nausea, visual changes, and depth perception distortions. Stroke risk factors include atrial fibrillation, hyperlipidemia, and hypertension. Prior stroke history: yes. Denies any HA, fever, chills, CP, SOB, cough, Abd pain, diarrhea, or dysuria. Majority of history was obtained via wife given dysphagia.     ______________________________________________________________________  DISCHARGE SUMMARY/HOSPITAL COURSE:  for full details see H&P, daily progress notes, labs, consult notes.      Principal Problem:    Dysphasia  H/o recurrent similar presentations  Initial ED Neuro Consult had some concern for intracerebral hemorrhage  Reassessment by same group Teledoc with new MRI ruled out hemorrhage  \[facility] [place] [facility]\"" Radiology did not see evidence of hemorrhage as well    Neuro Consult with Diana Barcenas DO in [place] was also obtained  With abcd2 score of 4 she advised ASA 81mg daily with Plavix 75mg daily for 21d only  She suggested f/u Event Monitoring and Cardiology visit to reconsider anticoagulation with Eliquis  In house Tele did note a brief 1-2 sec run of narrow complex QRS beats with some irregularity c/w pSVT vs Afib  Recurrent symptoms also consistent with possible seizure disorder - and as before a

## 2023-11-03 NOTE — PLAN OF CARE
Problem: Safety - Adult  Goal: Free from fall injury  Outcome: Progressing  Flowsheets (Taken 11/3/2023 0240)  Free From Fall Injury: Instruct family/caregiver on patient safety     Problem: ABCDS Injury Assessment  Goal: Absence of physical injury  Outcome: Progressing

## 2023-11-03 NOTE — ED NOTES
Pt remains in [address] [place] [address], [address] [place] [address]  11/02/23 1441

## 2023-11-03 NOTE — ED TRIAGE NOTES
Last known well 1830. Difficulty speaking.  Patient unable to read NIH wording at the time of arrival.

## 2023-11-03 NOTE — PROGRESS NOTES
Pt arrived to unit accompanied by wife. Pt transferred to bed by ambulating. NIHSS stroke assessment performed with ED nurse. Pt scored a three due to his speech. Vitals taken and parts of admission recorded. Pt's wife stated their family practice, [facility] will provide medication names. Their contact info is Dr. Yonas Rosen at . This information was passed onto the [facility] to call them in the morning to obtain med names. 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 Alma Echavarria. According to the wife, a small implanted device was placed in pt's chest to determine irregular rhythms. She was not aware if this device was MRI safe. This info will be passed onto the day shift as well for the MRI safety checklist.   Pt passed the bedside swallow test without any difficulties.

## 2023-11-03 NOTE — PLAN OF CARE
Med/surg nurse to page when patient has arrived to unit and ready for teledoc visit. Thank you.     Electronically signed by Aruna Fitzpatrick DO on 11/3/2023 at 12:21 AM

## 2023-11-04 LAB
ECHO AO ROOT DIAM: 4 CM
ECHO AO ROOT INDEX: 1.89 CM/M2
ECHO AV PEAK GRADIENT: 8 MMHG
ECHO AV PEAK VELOCITY: 1.5 M/S
ECHO BSA: 2.11 M2
ECHO EST RA PRESSURE: 3 MMHG
ECHO LA DIAMETER INDEX: 1.79 CM/M2
ECHO LA DIAMETER: 3.8 CM
ECHO LA TO AORTIC ROOT RATIO: 0.95
ECHO LA VOL A-L A2C: 79 ML (ref 18–58)
ECHO LA VOL A-L A2C: 87 ML (ref 18–58)
ECHO LA VOL A-L A4C: 80 ML (ref 18–58)
ECHO LA VOL A-L A4C: 86 ML (ref 18–58)
ECHO LA VOLUME AREA LENGTH: 90 ML
ECHO LA VOLUME INDEX AREA LENGTH: 42 ML/M2 (ref 16–34)
ECHO LV E' LATERAL VELOCITY: 11 CM/S
ECHO LV E' SEPTAL VELOCITY: 7 CM/S
ECHO LV FRACTIONAL SHORTENING: 33 % (ref 28–44)
ECHO LV INTERNAL DIMENSION DIASTOLE INDEX: 1.27 CM/M2
ECHO LV INTERNAL DIMENSION DIASTOLIC: 2.7 CM (ref 4.2–5.9)
ECHO LV INTERNAL DIMENSION SYSTOLIC INDEX: 0.85 CM/M2
ECHO LV INTERNAL DIMENSION SYSTOLIC: 1.8 CM
ECHO LV IVSD: 2.1 CM (ref 0.6–1)
ECHO LV MASS 2D: 212.7 G (ref 88–224)
ECHO LV MASS INDEX 2D: 100.3 G/M2 (ref 49–115)
ECHO LV POSTERIOR WALL DIASTOLIC: 1.7 CM (ref 0.6–1)
ECHO LV RELATIVE WALL THICKNESS RATIO: 1.26
ECHO LVOT AREA: 3.8 CM2
ECHO LVOT DIAM: 2.2 CM
ECHO MV A VELOCITY: 0.75 M/S
ECHO MV E DECELERATION TIME (DT): 192.4 MS
ECHO MV E VELOCITY: 0.75 M/S
ECHO MV E/A RATIO: 1
ECHO MV E/E' LATERAL: 6.82
ECHO MV E/E' RATIO (AVERAGED): 8.77
ECHO MV E/E' SEPTAL: 10.71
ECHO PULMONARY ARTERY SYSTOLIC PRESSURE (PASP): 23 MMHG
ECHO RA AREA 4C: 21 CM2
ECHO RIGHT VENTRICULAR SYSTOLIC PRESSURE (RVSP): 23 MMHG
ECHO RV TAPSE: 1.8 CM (ref 1.7–?)
ECHO TV REGURGITANT MAX VELOCITY: 2.25 M/S
ECHO TV REGURGITANT PEAK GRADIENT: 21 MMHG
EKG ATRIAL RATE: 94 BPM
EKG DIAGNOSIS: NORMAL
EKG P AXIS: 68 DEGREES
EKG P-R INTERVAL: 192 MS
EKG Q-T INTERVAL: 388 MS
EKG QRS DURATION: 94 MS
EKG QTC CALCULATION (BAZETT): 485 MS
EKG R AXIS: 40 DEGREES
EKG T AXIS: 75 DEGREES
EKG VENTRICULAR RATE: 94 BPM

## 2023-11-04 PROCEDURE — 93306 TTE W/DOPPLER COMPLETE: CPT | Performed by: INTERNAL MEDICINE

## 2023-11-04 NOTE — PLAN OF CARE
Problem: Physical Therapy - Adult  Goal: By Discharge: Performs mobility at highest level of function for planned discharge setting. See evaluation for individualized goals. Description: FUNCTIONAL STATUS PRIOR TO ADMISSION: Patient was independent and active without use of DME.    HOME SUPPORT PRIOR TO ADMISSION: The patient lived with spouse but did not require assistance for mobility tasks or BADLs; they share IADLs; pt regularly drives. Physical Therapy Goals  Initiated 11/3/2023  1. Patient will move from supine to sit and sit to supine in bed with independence within 7 day(s). 2.  Patient will perform sit to stand with modified independence within 7 day(s). 3.  Patient will transfer from bed to chair and chair to bed with modified independence using the least restrictive device within 7 day(s). 4.  Patient will ambulate with SBA for 500 feet with the least restrictive device within 7 day(s). 5.  Patient will ascend/descend 5 stairs with 1-2 handrail(s) with SBA within 7 day(s). 11/3/2023 1457 by Joellyn Phalen, PT  Outcome: Progressing     Problem: Occupational Therapy - Adult  Goal: By Discharge: Performs self-care activities at highest level of function for planned discharge setting. See evaluation for individualized goals. Description: FUNCTIONAL STATUS PRIOR TO ADMISSION:  Patient was ambulatory using no mobility aid. Independent in working at [employer], independent self care. Receives Help From: Family, ADL Assistance: Independent,  ,  ,  ,  ,  ,  , Ambulation Assistance: Independent, Transfer Assistance: Independent, Active : Yes     HOME SUPPORT: Patient lived with wife but didn't require assistance. Occupational Therapy Goals:  Initiated 11/3/2023  1. Patient will perform bathing in shower with Modified [place] within 7 day(s). 2.  Patient will perform lower body dressing for standing portions with Modified [place] within 7 day(s).   3.  Patient will perform shower transfer with Modified [place] within 7 day(s). 4. Patient will utilize energy conservation techniques during functional activities with verbal cues within 7 day(s). 5. Pt will perform 10 mins of standing endurance activity without loss of balance in 7 days.       11/3/2023 1523 by Bean Centeno OT  Outcome: Progressing

## 2023-11-04 NOTE — PLAN OF CARE
Problem: Physical Therapy - Adult  Goal: By Discharge: Performs mobility at highest level of function for planned discharge setting. See evaluation for individualized goals. Description: FUNCTIONAL STATUS PRIOR TO ADMISSION: Patient was independent and active without use of DME.    HOME SUPPORT PRIOR TO ADMISSION: The patient lived with spouse but did not require assistance for mobility tasks or BADLs; they share IADLs; pt regularly drives. Physical Therapy Goals  Initiated 11/3/2023  1. Patient will move from supine to sit and sit to supine in bed with independence within 7 day(s). 2.  Patient will perform sit to stand with modified independence within 7 day(s). 3.  Patient will transfer from bed to chair and chair to bed with modified independence using the least restrictive device within 7 day(s). 4.  Patient will ambulate with SBA for 500 feet with the least restrictive device within 7 day(s). 5.  Patient will ascend/descend 5 stairs with 1-2 handrail(s) with SBA within 7 day(s). 11/3/2023 1457 by Leena Christie, PT  Outcome: Progressing     Problem: Occupational Therapy - Adult  Goal: By Discharge: Performs self-care activities at highest level of function for planned discharge setting. See evaluation for individualized goals. Description: FUNCTIONAL STATUS PRIOR TO ADMISSION:  Patient was ambulatory using no mobility aid. Independent in working at [employer], independent self care. Receives Help From: Family, ADL Assistance: Independent,  ,  ,  ,  ,  ,  , Ambulation Assistance: Independent, Transfer Assistance: Independent, Active : Yes     HOME SUPPORT: Patient lived with wife but didn't require assistance. Occupational Therapy Goals:  Initiated 11/3/2023  1. Patient will perform bathing in shower with Modified [place] within 7 day(s). 2.  Patient will perform lower body dressing for standing portions with Modified [place] within 7 day(s).   3.  Patient will perform shower transfer with Modified [place] within 7 day(s). 4. Patient will utilize energy conservation techniques during functional activities with verbal cues within 7 day(s). 5. Pt will perform 10 mins of standing endurance activity without loss of balance in 7 days.       11/3/2023 1523 by Saeid Velasquez OT  Outcome: Progressing     Problem: Chronic Conditions and Co-morbidities  Goal: Patient's chronic conditions and co-morbidity symptoms are monitored and maintained or improved  Outcome: Progressing

## 2023-11-04 NOTE — PLAN OF CARE
Problem: Safety - Adult  Goal: Free from fall injury  11/3/2023 1850 by Cony Alvarez RN  Outcome: Adequate for Discharge  11/3/2023 1051 by Cony Alvarez RN  Outcome: Progressing     Problem: ABCDS Injury Assessment  Goal: Absence of physical injury  11/3/2023 1850 by Cony Alvarez RN  Outcome: Adequate for Discharge  11/3/2023 1051 by Cony Alvarez RN  Outcome: Progressing     Problem: Chronic Conditions and Co-morbidities  Goal: Patient's chronic conditions and co-morbidity symptoms are monitored and maintained or improved  11/3/2023 2005 by Tereza López RN  Outcome: Adequate for Discharge  11/3/2023 2005 by Tereza López RN  Outcome: Progressing  Flowsheets (Taken 11/3/2023 1935)  Care Plan - Patient's Chronic Conditions and Co-Morbidity Symptoms are Monitored and Maintained or Improved: Monitor and assess patient's chronic conditions and comorbid symptoms for stability, deterioration, or improvement     Problem: Physical Therapy - Adult  Goal: By Discharge: Performs mobility at highest level of function for planned discharge setting. See evaluation for individualized goals. Description: FUNCTIONAL STATUS PRIOR TO ADMISSION: Patient was independent and active without use of DME.    HOME SUPPORT PRIOR TO ADMISSION: The patient lived with spouse but did not require assistance for mobility tasks or BADLs; they share IADLs; pt regularly drives. Physical Therapy Goals  Initiated 11/3/2023  1. Patient will move from supine to sit and sit to supine in bed with independence within 7 day(s). 2.  Patient will perform sit to stand with modified independence within 7 day(s). 3.  Patient will transfer from bed to chair and chair to bed with modified independence using the least restrictive device within 7 day(s). 4.  Patient will ambulate with SBA for 500 feet with the least restrictive device within 7 day(s).    5.  Patient will ascend/descend 5 stairs with 1-2 handrail(s) with SBA within 7

## 2023-11-04 NOTE — PROGRESS NOTES
Discharge instructions reviewed with patient and wife. All question answered at this time. Patient off the floor at 2039.

## 2023-11-06 ENCOUNTER — TELEPHONE (OUTPATIENT)
Age: 83
End: 2023-11-06

## 2023-11-06 DIAGNOSIS — R41.0 CONFUSION: Primary | ICD-10-CM

## 2023-11-06 NOTE — CARE COORDINATION
Transition of Care Plan:    RUR: Calculating  Prior Level of Functioning: Mobility Assist  Disposition: Home with Spouse  If SNF or IPR: Date FOC offered: N/A  Date FOC received:   Accepting facility:   Date authorization started with reference number:   Date authorization received and expires: Follow up appointments: Patient advised re: PCP and neurology follow up appointment. DME needed:   Transportation at discharge:   IM/IMM Medicare/ letter given: N/A  Is patient a [place] and connected with [facility]? N/A   If yes, was [place] transfer form completed and [facility] notified? Caregiver Contact:   Discharge Caregiver contacted prior to discharge? Care Conference needed? Barriers to discharge:  None       11/06/23 1308   Services At/After Discharge   Transition of Care Consult (CM Consult) Discharge Critical access hospital None   [place] Resource Information Provided? No   Mode of Transport at Discharge Other (see comment)  (POV)   Condition of Participation: Discharge Planning   The Plan for Transition of Care is related to the following treatment goals: Home with Spouse   The Patient and/or Patient Representative was provided with a Choice of Provider?   (N/A)   Freedom of Choice list was provided with basic dialogue that supports the patient's individualized plan of care/goals, treatment preferences, and shares the quality data associated with the providers? No  (N/A)     Patient discharged to home  11/3/23. See CM note  this date 10 10:52 regarding  hospital follow up telephone call to [facility].

## 2023-11-06 NOTE — TELEPHONE ENCOUNTER
----- Message from Shayna Bolton DO sent at 11/3/2023  2:23 PM EDT -----  Hi can we please set this patient up for an outpatient routine EEG and neurology follow-up within 4weeks.   Thank you

## 2023-11-06 NOTE — TELEPHONE ENCOUNTER
----- Message from Brittni Sheridan DO sent at 11/3/2023  2:23 PM EDT -----  Hi can we please set this patient up for an outpatient routine EEG and neurology follow-up within 4weeks.  Thank you

## 2023-11-06 NOTE — CARE COORDINATION
[address] [place] [address] discharge follow up call to Mr. Diana Gomes. I did not get an answer to any of the phone numbers listed. Thee was voice mail set up on Mrs. Gómez Serve number [phone]. I left a voice mail message about scheduling a follow up PCP appointment and left the CM contact numbers.

## 2023-11-06 NOTE — TELEPHONE ENCOUNTER
I ordered the patient EEG. Coordination of Care should be reaching out to them to schedule. They just need the f/u appt.

## 2023-12-07 ENCOUNTER — TELEPHONE (OUTPATIENT)
Age: 83
End: 2023-12-07

## 2023-12-13 ENCOUNTER — OFFICE VISIT (OUTPATIENT)
Age: 83
End: 2023-12-13
Payer: MEDICARE

## 2023-12-13 VITALS
OXYGEN SATURATION: 98 % | HEART RATE: 77 BPM | RESPIRATION RATE: 18 BRPM | DIASTOLIC BLOOD PRESSURE: 82 MMHG | HEIGHT: 74 IN | WEIGHT: 195 LBS | TEMPERATURE: 97.7 F | BODY MASS INDEX: 25.03 KG/M2 | SYSTOLIC BLOOD PRESSURE: 120 MMHG

## 2023-12-13 DIAGNOSIS — G45.9 TIA (TRANSIENT ISCHEMIC ATTACK): ICD-10-CM

## 2023-12-13 DIAGNOSIS — I48.0 PAROXYSMAL ATRIAL FIBRILLATION (HCC): Primary | ICD-10-CM

## 2023-12-13 PROCEDURE — 1123F ACP DISCUSS/DSCN MKR DOCD: CPT | Performed by: INTERNAL MEDICINE

## 2023-12-13 PROCEDURE — G8428 CUR MEDS NOT DOCUMENT: HCPCS | Performed by: INTERNAL MEDICINE

## 2023-12-13 PROCEDURE — 3079F DIAST BP 80-89 MM HG: CPT | Performed by: INTERNAL MEDICINE

## 2023-12-13 PROCEDURE — 99214 OFFICE O/P EST MOD 30 MIN: CPT | Performed by: INTERNAL MEDICINE

## 2023-12-13 PROCEDURE — 3074F SYST BP LT 130 MM HG: CPT | Performed by: INTERNAL MEDICINE

## 2023-12-13 PROCEDURE — G8419 CALC BMI OUT NRM PARAM NOF/U: HCPCS | Performed by: INTERNAL MEDICINE

## 2023-12-13 PROCEDURE — 1036F TOBACCO NON-USER: CPT | Performed by: INTERNAL MEDICINE

## 2023-12-13 PROCEDURE — G8484 FLU IMMUNIZE NO ADMIN: HCPCS | Performed by: INTERNAL MEDICINE

## 2023-12-13 ASSESSMENT — PATIENT HEALTH QUESTIONNAIRE - PHQ9
SUM OF ALL RESPONSES TO PHQ QUESTIONS 1-9: 0
2. FEELING DOWN, DEPRESSED OR HOPELESS: 0
SUM OF ALL RESPONSES TO PHQ9 QUESTIONS 1 & 2: 0
SUM OF ALL RESPONSES TO PHQ QUESTIONS 1-9: 0
SUM OF ALL RESPONSES TO PHQ QUESTIONS 1-9: 0
1. LITTLE INTEREST OR PLEASURE IN DOING THINGS: 0
SUM OF ALL RESPONSES TO PHQ QUESTIONS 1-9: 0

## 2023-12-13 NOTE — PATIENT INSTRUCTIONS
I have ordered a 30-day event monitor to screen for atrial fibrillation. We will contact you with the results.

## 2023-12-15 ENCOUNTER — TELEPHONE (OUTPATIENT)
Age: 83
End: 2023-12-15

## 2023-12-15 NOTE — TELEPHONE ENCOUNTER
Called patient verify that patient was still coming to his apt on 12/15/2023.     Could not leave VM

## 2024-01-30 ENCOUNTER — OFFICE VISIT (OUTPATIENT)
Age: 84
End: 2024-01-30
Payer: MEDICARE

## 2024-01-30 VITALS
SYSTOLIC BLOOD PRESSURE: 150 MMHG | BODY MASS INDEX: 24.52 KG/M2 | DIASTOLIC BLOOD PRESSURE: 100 MMHG | OXYGEN SATURATION: 98 % | TEMPERATURE: 97.4 F | RESPIRATION RATE: 16 BRPM | HEART RATE: 75 BPM | HEIGHT: 76 IN | WEIGHT: 201.4 LBS

## 2024-01-30 DIAGNOSIS — G43.109 MIGRAINE WITH AURA AND WITHOUT STATUS MIGRAINOSUS, NOT INTRACTABLE: ICD-10-CM

## 2024-01-30 DIAGNOSIS — R41.82 ACUTE ALTERATION IN MENTAL STATUS: ICD-10-CM

## 2024-01-30 DIAGNOSIS — G45.9 TIA (TRANSIENT ISCHEMIC ATTACK): ICD-10-CM

## 2024-01-30 DIAGNOSIS — I67.89 CEREBRAL MICROVASCULAR DISEASE: ICD-10-CM

## 2024-01-30 DIAGNOSIS — I48.0 PAROXYSMAL ATRIAL FIBRILLATION (HCC): Primary | ICD-10-CM

## 2024-01-30 DIAGNOSIS — I69.951 HEMIPARESIS OF RIGHT DOMINANT SIDE AS LATE EFFECT OF CEREBROVASCULAR DISEASE, UNSPECIFIED CEREBROVASCULAR DISEASE TYPE (HCC): ICD-10-CM

## 2024-01-30 DIAGNOSIS — Z86.79 HISTORY OF ATRIAL FIBRILLATION: ICD-10-CM

## 2024-01-30 DIAGNOSIS — R56.9 CONVULSIONS, UNSPECIFIED CONVULSION TYPE (HCC): ICD-10-CM

## 2024-01-30 PROCEDURE — 99214 OFFICE O/P EST MOD 30 MIN: CPT | Performed by: PSYCHIATRY & NEUROLOGY

## 2024-01-30 PROCEDURE — G8420 CALC BMI NORM PARAMETERS: HCPCS | Performed by: PSYCHIATRY & NEUROLOGY

## 2024-01-30 PROCEDURE — 3080F DIAST BP >= 90 MM HG: CPT | Performed by: PSYCHIATRY & NEUROLOGY

## 2024-01-30 PROCEDURE — G8427 DOCREV CUR MEDS BY ELIG CLIN: HCPCS | Performed by: PSYCHIATRY & NEUROLOGY

## 2024-01-30 PROCEDURE — 1123F ACP DISCUSS/DSCN MKR DOCD: CPT | Performed by: PSYCHIATRY & NEUROLOGY

## 2024-01-30 PROCEDURE — 3075F SYST BP GE 130 - 139MM HG: CPT | Performed by: PSYCHIATRY & NEUROLOGY

## 2024-01-30 PROCEDURE — G8484 FLU IMMUNIZE NO ADMIN: HCPCS | Performed by: PSYCHIATRY & NEUROLOGY

## 2024-01-30 PROCEDURE — 1036F TOBACCO NON-USER: CPT | Performed by: PSYCHIATRY & NEUROLOGY

## 2024-01-30 ASSESSMENT — PATIENT HEALTH QUESTIONNAIRE - PHQ9
SUM OF ALL RESPONSES TO PHQ QUESTIONS 1-9: 0
SUM OF ALL RESPONSES TO PHQ9 QUESTIONS 1 & 2: 0
2. FEELING DOWN, DEPRESSED OR HOPELESS: 0
SUM OF ALL RESPONSES TO PHQ QUESTIONS 1-9: 0
SUM OF ALL RESPONSES TO PHQ QUESTIONS 1-9: 0
SUM OF ALL RESPONSES TO PHQ9 QUESTIONS 1 & 2: 0
SUM OF ALL RESPONSES TO PHQ QUESTIONS 1-9: 0
1. LITTLE INTEREST OR PLEASURE IN DOING THINGS: 0
2. FEELING DOWN, DEPRESSED OR HOPELESS: 0
1. LITTLE INTEREST OR PLEASURE IN DOING THINGS: 0

## 2024-01-30 NOTE — PROGRESS NOTES
Consult  REFERRED BY:  Payal Goldsmith MD    CHIEF COMPLAINT: Recurring spells of headache and loss of speech lasting approximately a day each time, with episodes that have occurred on September 2022, February 2023, may 2023, and November 2023.      Subjective:     Andre Harden is a 83 y.o. right-handed  male, we are seeing as a new patient to me, at the request of Dr. Goldsmith for evaluation of new problem of patient having recurring episodes of loss of speech and always occurs with a headache, some nausea and occasional vomiting, that last about a days time, but the patient having more of an expressive type aphasia, and then the headache goes away and the patient can then talk again.  Sometimes she has had intermittent atrial fibrillation during these episodes and is not on anticoagulation there was no he was previously before he had an ablation, but apparently the ablation is not successful.  He is also running high blood pressure, and oftentimes has accelerated hypertension during these episodes, and today has high blood pressure, we suggested he talk to his cardiologist about possibly treating the blood pressure, that may be causing some vasospasm because his wife that his blood pressure is gone up to 180/120 at times.  The patient never had any physical impairment as far as weakness, gait problems, sensory loss or other cranial nerve problems.  He has been admitted twice and his MRI scans just show mild atrophic changes and white matter disease consistent with his age.  He has never had a stroke.  His CTA of the head and neck was perfectly normal in November 2023.  Someone suggested he is having complicated migraines, somewhat also just as he is having seizures.  I wonder if he is just having episodes of vasospasm from accelerated hypertension.  He also is going in and out of A-fib and has at least twice during these episodes so 1 wonders with his cardiac history recurrent TIAs but he should not be on

## 2024-01-31 LAB — ERYTHROCYTE [SEDIMENTATION RATE] IN BLOOD: 5 MM/HR (ref 0–20)

## 2024-02-01 LAB
CENTROMERE B AB SER-ACNC: <0.2 AI (ref 0–0.9)
CHROMATIN AB SERPL-ACNC: <0.2 AI (ref 0–0.9)
DSDNA AB SER-ACNC: 3 IU/ML (ref 0–9)
ENA JO1 AB SER-ACNC: <0.2 AI (ref 0–0.9)
ENA RNP AB SER-ACNC: 1.4 AI (ref 0–0.9)
ENA SCL70 AB SER-ACNC: <0.2 AI (ref 0–0.9)
ENA SM AB SER-ACNC: <0.2 AI (ref 0–0.9)
ENA SM+RNP AB SER-ACNC: <0.2 AI (ref 0–0.9)
ENA SS-A AB SER-ACNC: <0.2 AI (ref 0–0.9)
ENA SS-B AB SER-ACNC: <0.2 AI (ref 0–0.9)
RIBOSOMAL P AB SER-ACNC: <0.2 AI (ref 0–0.9)
SEE BELOW:, 164879: ABNORMAL

## 2024-02-01 NOTE — PROGRESS NOTES
0700 Bedside and Verbal shift change report given to Sunita Jansen RN, Charo Franklin, Nursing Student (oncoming nurse) by Miguel Nathan RN (offgoing nurse). Report included the following information SBAR, Kardex, Intake/Output, MAR, Accordion and Recent Results. 0800 Assessment complete, see flowsheet for details. 0900 Spoke with Dr. Mary Salgado regarding pain management, new orders received. 1200  Reassessment unchanged, will continue to monitor. Hide Additional Notes?: No Detail Level: Zone

## 2024-06-25 ENCOUNTER — TRANSCRIBE ORDERS (OUTPATIENT)
Facility: HOSPITAL | Age: 84
End: 2024-06-25

## 2024-06-25 DIAGNOSIS — R41.89 OTHER SYMPTOMS AND SIGNS INVOLVING COGNITIVE FUNCTIONS AND AWARENESS: Primary | ICD-10-CM

## 2024-06-26 ENCOUNTER — HOSPITAL ENCOUNTER (OUTPATIENT)
Facility: HOSPITAL | Age: 84
Discharge: HOME | End: 2024-06-29
Attending: INTERNAL MEDICINE
Payer: MEDICARE

## 2024-06-26 DIAGNOSIS — R41.89 OTHER SYMPTOMS AND SIGNS INVOLVING COGNITIVE FUNCTIONS AND AWARENESS: ICD-10-CM

## 2024-06-26 PROCEDURE — 6360000004 HC RX CONTRAST MEDICATION: Performed by: INTERNAL MEDICINE

## 2024-06-26 PROCEDURE — 70496 CT ANGIOGRAPHY HEAD: CPT

## 2024-06-26 RX ADMIN — IOPAMIDOL 100 ML: 755 INJECTION, SOLUTION INTRAVENOUS at 10:50

## 2024-08-06 ENCOUNTER — OFFICE VISIT (OUTPATIENT)
Age: 84
End: 2024-08-06
Payer: MEDICARE

## 2024-08-06 VITALS
WEIGHT: 194.6 LBS | TEMPERATURE: 97.1 F | HEART RATE: 69 BPM | SYSTOLIC BLOOD PRESSURE: 126 MMHG | BODY MASS INDEX: 24.97 KG/M2 | OXYGEN SATURATION: 97 % | RESPIRATION RATE: 15 BRPM | HEIGHT: 74 IN | DIASTOLIC BLOOD PRESSURE: 86 MMHG

## 2024-08-06 DIAGNOSIS — I69.351 HEMIPARESIS OF RIGHT DOMINANT SIDE AS LATE EFFECT OF CEREBRAL INFARCTION (HCC): ICD-10-CM

## 2024-08-06 DIAGNOSIS — R42 DIZZINESSES: ICD-10-CM

## 2024-08-06 DIAGNOSIS — R93.0 ABNORMAL MRI OF THE HEAD: ICD-10-CM

## 2024-08-06 DIAGNOSIS — G43.109 MIGRAINE WITH AURA AND WITHOUT STATUS MIGRAINOSUS, NOT INTRACTABLE: ICD-10-CM

## 2024-08-06 DIAGNOSIS — I67.1 CEREBRAL ANEURYSM WITHOUT RUPTURE: ICD-10-CM

## 2024-08-06 DIAGNOSIS — I67.89 CEREBRAL MICROVASCULAR DISEASE: ICD-10-CM

## 2024-08-06 DIAGNOSIS — Q04.9 MEGA CISTERNA MAGNA (HCC): Primary | ICD-10-CM

## 2024-08-06 DIAGNOSIS — R27.0 ATAXIA: ICD-10-CM

## 2024-08-06 PROCEDURE — 1036F TOBACCO NON-USER: CPT | Performed by: PSYCHIATRY & NEUROLOGY

## 2024-08-06 PROCEDURE — 3079F DIAST BP 80-89 MM HG: CPT | Performed by: PSYCHIATRY & NEUROLOGY

## 2024-08-06 PROCEDURE — G8420 CALC BMI NORM PARAMETERS: HCPCS | Performed by: PSYCHIATRY & NEUROLOGY

## 2024-08-06 PROCEDURE — 1123F ACP DISCUSS/DSCN MKR DOCD: CPT | Performed by: PSYCHIATRY & NEUROLOGY

## 2024-08-06 PROCEDURE — 3074F SYST BP LT 130 MM HG: CPT | Performed by: PSYCHIATRY & NEUROLOGY

## 2024-08-06 PROCEDURE — G8427 DOCREV CUR MEDS BY ELIG CLIN: HCPCS | Performed by: PSYCHIATRY & NEUROLOGY

## 2024-08-06 PROCEDURE — 99214 OFFICE O/P EST MOD 30 MIN: CPT | Performed by: PSYCHIATRY & NEUROLOGY

## 2024-08-06 RX ORDER — AMLODIPINE BESYLATE 5 MG/1
5 TABLET ORAL DAILY
COMMUNITY
Start: 2024-06-20

## 2024-08-06 ASSESSMENT — PATIENT HEALTH QUESTIONNAIRE - PHQ9
2. FEELING DOWN, DEPRESSED OR HOPELESS: NOT AT ALL
1. LITTLE INTEREST OR PLEASURE IN DOING THINGS: NOT AT ALL
SUM OF ALL RESPONSES TO PHQ QUESTIONS 1-9: 0
SUM OF ALL RESPONSES TO PHQ9 QUESTIONS 1 & 2: 0
SUM OF ALL RESPONSES TO PHQ QUESTIONS 1-9: 0

## 2024-08-06 NOTE — PROGRESS NOTES
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  Patient previously seen for recurring spells of headache and loss of speech lasting approximately a day each time, with episodes that have occurred on September 2022, February 2023, may 2023, and November 2023.      Subjective:     Andre Harden is a 83 y.o. right-handed  male, we are seeing at the request of Dr. 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  He has had increasing spells of dizziness and had 1 fall, and his dizziness seems to be more related to positional movement.  He has had a recent cardiac ablation that was successful and he is off Eliquis on Plavix now, and doing well.  His spells of speech arrest seem to have gotten better.  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  He is also running high blood pressure, in the past and oftentimes has accelerated hypertension during these episodes.  The patient never had any physical impairment as far as weakness, gait problems, sensory loss or other cranial nerve problems.  He has been admitted

## 2024-08-23 ENCOUNTER — HOSPITAL ENCOUNTER (OUTPATIENT)
Facility: HOSPITAL | Age: 84
End: 2024-08-23
Attending: PSYCHIATRY & NEUROLOGY
Payer: MEDICARE

## 2024-08-23 DIAGNOSIS — Q04.9 MEGA CISTERNA MAGNA (HCC): ICD-10-CM

## 2024-08-23 DIAGNOSIS — G43.109 MIGRAINE WITH AURA AND WITHOUT STATUS MIGRAINOSUS, NOT INTRACTABLE: ICD-10-CM

## 2024-08-23 DIAGNOSIS — I69.351 HEMIPARESIS OF RIGHT DOMINANT SIDE AS LATE EFFECT OF CEREBRAL INFARCTION (HCC): ICD-10-CM

## 2024-08-23 DIAGNOSIS — R93.0 ABNORMAL MRI OF THE HEAD: ICD-10-CM

## 2024-08-23 DIAGNOSIS — I67.89 CEREBRAL MICROVASCULAR DISEASE: ICD-10-CM

## 2024-08-23 PROCEDURE — 6360000004 HC RX CONTRAST MEDICATION: Performed by: PSYCHIATRY & NEUROLOGY

## 2024-08-23 PROCEDURE — 70553 MRI BRAIN STEM W/O & W/DYE: CPT

## 2024-08-23 PROCEDURE — A9579 GAD-BASE MR CONTRAST NOS,1ML: HCPCS | Performed by: PSYCHIATRY & NEUROLOGY

## 2024-08-23 RX ADMIN — GADOTERIDOL 18 ML: 279.3 INJECTION, SOLUTION INTRAVENOUS at 16:00

## 2024-08-26 ENCOUNTER — CLINICAL DOCUMENTATION (OUTPATIENT)
Age: 84
End: 2024-08-26

## 2024-08-27 NOTE — PROGRESS NOTES
I called the patient, advised him the MRI scan was stable he said thank you, he will continue with his current workup and evaluation

## 2024-09-12 NOTE — PROGRESS NOTES
[facility]  Pulmonary, Critical Care, and Sleep Medicine    Name: Sylvie Rock MRN: [id number]   : 1940 Hospital: [facility]   Date: 2018          IMPRESSION:   · S/p Afib ablation  · Pericardial Effusion  · Acute post op respiratory support  · Hypotension noted earlier now resolved. · Risk of harming himself placed  Order for bilateral wrist restraints. · Prior CVA, TIA  · Hypertension  · Hypothyroidism  · Has been on Chronic Anticoagulation with Xarelto and ASA. RECOMMENDATIONS:   · Extubated, off O2    · Pericardial drain removed  · PUD/DVT prophylaxis.     · DC IVF  · Mobilize  · Advance diet  · Transfer to floor, day 2 awaiting for a bed     Subjective/History:     No acute events overnight  No acute complaints  No acute distress    Current Facility-Administered Medications   Medication Dose Route Frequency    0.45% sodium chloride infusion  75 mL/hr IntraVENous CONTINUOUS    dexamethasone (DECADRON) 4 mg/mL injection 4 mg  4 mg IntraVENous Q8H    sodium chloride (NS) flush 5-10 mL  5-10 mL IntraVENous Q8H    tamsulosin (FLOMAX) capsule 0.8 mg  0.8 mg Oral DAILY    timolol (TIMOPTIC) 0.5 % ophthalmic solution 1 Drop  1 Drop Both Eyes DAILY    aspirin chewable tablet 81 mg  81 mg Oral DAILY    levothyroxine (SYNTHROID) tablet 75 mcg  75 mcg Oral ACB    latanoprost (XALATAN) 0.005 % ophthalmic solution 1 Drop  1 Drop Both Eyes QHS    sodium chloride (NS) flush 5-10 mL  5-10 mL IntraVENous Q8H    sodium chloride (NS) flush 5-10 mL  5-10 mL IntraVENous Q8H    ketorolac (TORADOL) injection 15 mg  15 mg IntraVENous Q6H    mupirocin (BACTROBAN) 2 % ointment   Both Nostrils Q12H         Review of Systems:  No complaints  ROS negative    Objective:   Vital Signs:    Visit Vitals    /66 (BP 1 Location: Right arm, BP Patient Position: At rest;Head of bed elevated (Comment degrees))    Pulse 91    Temp 98.6 °F (37 °C)    Resp 16    Ht 6' 3\" (1.905 m)    Wt 98.3 kg (216 lb 11.4 oz)    SpO2 93%    BMI 27.09 kg/m2       O2 Device: Room air   O2 Flow Rate (L/min): 2 l/min   Temp (24hrs), Av.2 °F (36.8 °C), Min:97.6 °F (36.4 °C), Max:98.6 °F (37 °C)       Intake/Output:   Last shift:         Last 3 shifts: 1901 -  07  In: 0182 [P.O.:1700; I.V.:2101]  Out: 1090 [Urine:760; Drains:330]    Intake/Output Summary (Last 24 hours) at 18 0750  Last data filed at 18 0400   Gross per 24 hour   Intake          3186.58 ml   Output              375 ml   Net          2811.58 ml     Hemodynamics:   PAP:   CO:     Wedge:   CI:     CVP:    SVR:       PVR:       Ventilator Settings:  Mode Rate Tidal Volume Pressure FiO2 PEEP   Assist control   500 ml    40 % 6 cm H20     Peak airway pressure: 21 cm H2O    Minute ventilation: 9.02 l/min      Physical Exam:    General:  No distress. Head:  Normocephalic, without obvious abnormality, atraumatic. Eyes:  Conjunctivae/corneas clear. PERRL, EOMs intact. Nose: Nares normal. Septum midline. Mucosa normal. No drainage or sinus tenderness. Throat: Lips, mucosa, and tongue normal. Teeth and gums normal.   Neck: Supple, symmetrical, trachea midline, no adenopathy, thyroid: no enlargment/tenderness/nodules, no carotid bruit and no JVD. Back:   Symmetric, no curvature. ROM normal.   Lungs:   Clear to auscultation bilaterally. Good air movement anteriorly. Chest wall:  No tenderness or deformity. Has a subxiphoid pericardial drain sutured in place. . Has about 100cc of blood effusion. Heart:  Regular rate and rhythm, S1, S2 normal, no murmur, click, rub or gallop. Abdomen:   Soft, non-tender. Bowel sounds normal. No masses,  No organomegaly. Extremities: Extremities normal, atraumatic, no cyanosis or edema. Pulses: 2+ and symmetric all extremities.    Skin: Skin color, texture, turgor normal. No rashes or lesions   Lymph nodes: Cervical, supraclavicular, and axillary nodes normal. Neurologic: Grossly nonfocal, moving all extremities.    Psych: cooperative        Data:     Recent Results (from the past 24 hour(s))   METABOLIC PANEL, BASIC    Collection Time: 04/11/18  5:32 AM   Result Value Ref Range    Sodium 131 (L) 136 - 145 mmol/L    Potassium 4.3 3.5 - 5.1 mmol/L    Chloride 99 97 - 108 mmol/L    CO2 23 21 - 32 mmol/L    Anion gap 9 5 - 15 mmol/L    Glucose 129 (H) 65 - 100 mg/dL    BUN 17 6 - 20 MG/DL    Creatinine 0.71 0.70 - 1.30 MG/DL    BUN/Creatinine ratio 24 (H) 12 - 20      GFR est AA >60 >60 ml/min/1.73m2    GFR est non-AA >60 >60 ml/min/1.73m2    Calcium 7.6 (L) 8.5 - 10.1 MG/DL   CBC W/O DIFF    Collection Time: 04/11/18  5:32 AM   Result Value Ref Range    WBC 15.7 (H) 4.1 - 11.1 K/uL    RBC 3.92 (L) 4.10 - 5.70 M/uL    HGB 11.5 (L) 12.1 - 17.0 g/dL    HCT 32.2 (L) 36.6 - 50.3 %    MCV 82.1 80.0 - 99.0 FL    MCH 29.3 26.0 - 34.0 PG    MCHC 35.7 30.0 - 36.5 g/dL    RDW 13.4 11.5 - 14.5 %    PLATELET 282 787 - 132 K/uL    MPV 10.2 8.9 - 12.9 FL    NRBC 0.0 0  WBC    ABSOLUTE NRBC 0.00 0.00 - 0.01 K/uL             Imaging:  I have personally reviewed the patients radiographs and have reviewed the reports:  CXR: sherry Little MD PROVIDER:[TOKEN:[[id number]]]

## 2024-11-08 ENCOUNTER — TELEPHONE (OUTPATIENT)
Age: 84
End: 2024-11-08

## 2024-11-08 NOTE — TELEPHONE ENCOUNTER
Patient:  Andre Harden  :  1940    Patient identified with two identifiers.    Natalia ([facility]) called. She states that Mr. Harden was seen today by Dr. Goldsmith.  His /62 with pulse of 60 (irregular).  He told her that he was \"washed out yesterday\" and his pulse was 40.  Dr. Goldsmith wants Dr. Bishop to be aware and thinks he needs to see Dr. Bishop.  I told her that Dr. Bishop is not in the office on .  She wants Arti Castañeda LPN to be aware and maybe find a opening for him.  Natalia would like our office to call patient with a appointment.

## 2024-11-12 PROBLEM — I49.3 PVCS (PREMATURE VENTRICULAR CONTRACTIONS): Status: ACTIVE | Noted: 2024-11-12

## 2024-11-12 NOTE — PROGRESS NOTES
ASSESSMENT and PLAN  1.  PVCs (premature ventricular contractions)  19% PVC burden on monitor 12/2023.  Symptomatic.  Schedule echo and 72-hour monitor.  Consider treatment options and refer back to Dr. Flores if his PVC burden is high.    2. Bradycardia  Normal heart rate range and average HR 80 bpm on monitor 12/2023.  Suspect some of his low heart rates represent pseudobradycardia due to PVCs.  To be certain, repeat monitor to establish a rhythm-symptom correlation.    3. Paroxysmal atrial fibrillation (HCC)  No documented recurrence s/p PVI ablation 4/9/2018.  ILR end of service 12/19/2021.  Not on an OAC by patient choice    4. Status post placement of implantable loop recorder  Status post loop recorder implantation at the time of PVI ablation 4/9/2018.  ILR end of service 12/19/2021.      Follow-up in 6 months.  We will contact him in the interim with results of his echo and monitor.  The patient has been instructed and agrees to call our office with any issues or other concerns related to their cardiac condition(s) and/or complaint(s).      CHIEF COMPLAINT  Bradycardia    HPI:    Andre Harden is a 84 y.o. male here for follow-up of the request of Dr. Goldsmith.  He has been seeing Dr. Vance Simental of neurology for recurrent episodes of expressive aphasia associated with headache.  His evaluation is ongoing.  He was last here on 12/13/2023 after a recurrent neuro event with considerations then including TIA versus seizure or whether some neurodegenerative process such as vascular dementia or Alzheimer's disease was contributing.  I favored empirically reinstituting an OAC and reimplanting an ILR but he declined.  Instead, 30-day MCOT was performed but he completed only 8 days of it.  The study demonstrated 19% PVCs and no atrial fibrillation or flutter.  I recommended a loop recorder but he never followed through.    He no showed for visit with me in 7/2024.  Dr. Goldsmith contacted our office last week

## 2024-11-13 ENCOUNTER — OFFICE VISIT (OUTPATIENT)
Age: 84
End: 2024-11-13

## 2024-11-13 VITALS
DIASTOLIC BLOOD PRESSURE: 84 MMHG | SYSTOLIC BLOOD PRESSURE: 130 MMHG | WEIGHT: 198 LBS | TEMPERATURE: 98.5 F | OXYGEN SATURATION: 96 % | HEART RATE: 39 BPM | HEIGHT: 74 IN | BODY MASS INDEX: 25.41 KG/M2 | RESPIRATION RATE: 18 BRPM

## 2024-11-13 DIAGNOSIS — R00.1 BRADYCARDIA: ICD-10-CM

## 2024-11-13 DIAGNOSIS — I49.3 PVCS (PREMATURE VENTRICULAR CONTRACTIONS): Primary | ICD-10-CM

## 2024-11-13 DIAGNOSIS — I48.0 PAROXYSMAL ATRIAL FIBRILLATION (HCC): ICD-10-CM

## 2024-11-13 DIAGNOSIS — Z95.818 STATUS POST PLACEMENT OF IMPLANTABLE LOOP RECORDER: ICD-10-CM

## 2024-11-13 ASSESSMENT — PATIENT HEALTH QUESTIONNAIRE - PHQ9
SUM OF ALL RESPONSES TO PHQ QUESTIONS 1-9: 0
2. FEELING DOWN, DEPRESSED OR HOPELESS: NOT AT ALL
1. LITTLE INTEREST OR PLEASURE IN DOING THINGS: NOT AT ALL
SUM OF ALL RESPONSES TO PHQ9 QUESTIONS 1 & 2: 0
SUM OF ALL RESPONSES TO PHQ QUESTIONS 1-9: 0

## 2024-11-13 NOTE — PATIENT INSTRUCTIONS
I have ordered an echocardiogram and 72-hour monitor for evaluation of your heart and heart rate.  We will contact you with results.

## 2024-11-13 NOTE — PROGRESS NOTES
Identified pt with two pt identifiers(name and ). Reviewed record in preparation for visit and have obtained necessary documentation.  Chief Complaint   Patient presents with    Atrial Fibrillation    Hypertension    Cholesterol Problem    Other     PVC      /84 (Site: Left Upper Arm, Position: Sitting, Cuff Size: Medium Adult)   Pulse (!) 39   Temp 98.5 °F (36.9 °C) (Temporal)   Resp 18   Ht 1.88 m (6' 2\")   Wt 89.8 kg (198 lb)   SpO2 96%   BMI 25.42 kg/m²       Medications reviewed/approved by provider.      Health Maintenance Review: Patient reminded of \"due or due soon\" health maintenance. I have asked the patient to contact his/her primary care provider (PCP) for follow-up on his/her health maintenance.    Coordination of Care Questionnaire:  :   1) Have you been to an emergency room, urgent care, or hospitalized since your last visit?  If yes, where when, and reason for visit? no       2. Have seen or consulted any other health care provider since your last visit?   If yes, where when, and reason for visit?  yes - [facility]      Patient is accompanied by spouse I have received verbal consent from Andre Harden to discuss any/all medical information while they are present in the room.

## 2024-12-03 DIAGNOSIS — I48.0 PAF (PAROXYSMAL ATRIAL FIBRILLATION) (HCC): ICD-10-CM

## 2024-12-03 DIAGNOSIS — I49.3 PVCS (PREMATURE VENTRICULAR CONTRACTIONS): Primary | ICD-10-CM

## 2024-12-03 RX ORDER — METOPROLOL SUCCINATE 25 MG/1
25 TABLET, EXTENDED RELEASE ORAL DAILY
Qty: 30 TABLET | Refills: 5 | Status: SHIPPED | OUTPATIENT
Start: 2024-12-03

## 2024-12-09 ENCOUNTER — HOSPITAL ENCOUNTER (OUTPATIENT)
Facility: HOSPITAL | Age: 84
Discharge: HOME | End: 2024-12-12
Attending: INTERNAL MEDICINE
Payer: MEDICARE

## 2024-12-09 DIAGNOSIS — I49.3 PVCS (PREMATURE VENTRICULAR CONTRACTIONS): ICD-10-CM

## 2024-12-09 LAB
ECHO AO ROOT DIAM: 3.8 CM
ECHO AO ST JNCT DIAM: 2.9 CM
ECHO AV PEAK GRADIENT: 4 MMHG
ECHO AV PEAK VELOCITY: 1 M/S
ECHO EST RA PRESSURE: 3 MMHG
ECHO LA DIAMETER: 3.8 CM
ECHO LA TO AORTIC ROOT RATIO: 1
ECHO LA VOL A-L A2C: 156 ML (ref 18–58)
ECHO LA VOL A-L A4C: 69 ML (ref 18–58)
ECHO LA VOL MOD A2C: 152 ML (ref 18–58)
ECHO LA VOL MOD A4C: 64 ML (ref 18–58)
ECHO LA VOLUME AREA LENGTH: 109 ML
ECHO LV E' LATERAL VELOCITY: 11.13 CM/S
ECHO LV E' SEPTAL VELOCITY: 6.83 CM/S
ECHO LV EF PHYSICIAN: 65 %
ECHO LV FRACTIONAL SHORTENING: 32 % (ref 28–44)
ECHO LV INTERNAL DIMENSION DIASTOLIC: 4.7 CM (ref 4.2–5.9)
ECHO LV INTERNAL DIMENSION SYSTOLIC: 3.2 CM
ECHO LV IVSD: 1.1 CM (ref 0.6–1)
ECHO LV MASS 2D: 175.8 G (ref 88–224)
ECHO LV POSTERIOR WALL DIASTOLIC: 1 CM (ref 0.6–1)
ECHO LV RELATIVE WALL THICKNESS RATIO: 0.43
ECHO LVOT AREA: 3.8 CM2
ECHO LVOT DIAM: 2.2 CM
ECHO MV A VELOCITY: 0.45 M/S
ECHO MV E DECELERATION TIME (DT): 396.2 MS
ECHO MV E VELOCITY: 0.59 M/S
ECHO MV E/A RATIO: 1.31
ECHO MV E/E' LATERAL: 5.3
ECHO MV E/E' RATIO (AVERAGED): 6.97
ECHO MV E/E' SEPTAL: 8.64
ECHO MV MAX VELOCITY: 0.7 M/S
ECHO MV MEAN GRADIENT: 1 MMHG
ECHO MV MEAN VELOCITY: 0.4 M/S
ECHO MV PEAK GRADIENT: 2 MMHG
ECHO MV VTI: 23.8 CM
ECHO PULMONARY ARTERY END DIASTOLIC PRESSURE: 3 MMHG
ECHO PULMONARY ARTERY SYSTOLIC PRESSURE (PASP): 30 MMHG
ECHO PV MAX VELOCITY: 0.7 M/S
ECHO PV PEAK GRADIENT: 2 MMHG
ECHO PV REGURGITANT MAX VELOCITY: 0.8 M/S
ECHO RA AREA 4C: 24.3 CM2
ECHO RIGHT VENTRICULAR SYSTOLIC PRESSURE (RVSP): 25 MMHG
ECHO RV BASAL DIMENSION: 4.1 CM
ECHO RV FREE WALL PEAK S': 12.4 CM/S
ECHO RV TAPSE: 1.5 CM (ref 1.7–?)
ECHO TV REGURGITANT MAX VELOCITY: 2.34 M/S
ECHO TV REGURGITANT PEAK GRADIENT: 22 MMHG

## 2024-12-09 PROCEDURE — 93306 TTE W/DOPPLER COMPLETE: CPT | Performed by: INTERNAL MEDICINE

## 2024-12-09 PROCEDURE — 93306 TTE W/DOPPLER COMPLETE: CPT

## 2024-12-09 NOTE — RESULT ENCOUNTER NOTE
The echo demonstrated normal heart function and no significant valve abnormalities.  There were no abnormal findings of concern.  The study is unchanged from the previous echo on 11/3/2023.

## 2025-03-17 ENCOUNTER — OFFICE VISIT (OUTPATIENT)
Age: 85
End: 2025-03-17
Payer: MEDICARE

## 2025-03-17 VITALS
SYSTOLIC BLOOD PRESSURE: 134 MMHG | TEMPERATURE: 97.9 F | BODY MASS INDEX: 25.59 KG/M2 | HEART RATE: 69 BPM | WEIGHT: 199.4 LBS | HEIGHT: 74 IN | OXYGEN SATURATION: 99 % | RESPIRATION RATE: 17 BRPM | DIASTOLIC BLOOD PRESSURE: 70 MMHG

## 2025-03-17 DIAGNOSIS — I67.89 CEREBRAL MICROVASCULAR DISEASE: ICD-10-CM

## 2025-03-17 DIAGNOSIS — I67.1 CEREBRAL ANEURYSM WITHOUT RUPTURE: Primary | ICD-10-CM

## 2025-03-17 DIAGNOSIS — G43.109 MIGRAINE WITH AURA AND WITHOUT STATUS MIGRAINOSUS, NOT INTRACTABLE: ICD-10-CM

## 2025-03-17 DIAGNOSIS — I69.351 HEMIPARESIS OF RIGHT DOMINANT SIDE AS LATE EFFECT OF CEREBRAL INFARCTION (HCC): ICD-10-CM

## 2025-03-17 DIAGNOSIS — R93.0 ABNORMAL MRI OF THE HEAD: ICD-10-CM

## 2025-03-17 PROCEDURE — G8427 DOCREV CUR MEDS BY ELIG CLIN: HCPCS | Performed by: PSYCHIATRY & NEUROLOGY

## 2025-03-17 PROCEDURE — 1126F AMNT PAIN NOTED NONE PRSNT: CPT | Performed by: PSYCHIATRY & NEUROLOGY

## 2025-03-17 PROCEDURE — 3078F DIAST BP <80 MM HG: CPT | Performed by: PSYCHIATRY & NEUROLOGY

## 2025-03-17 PROCEDURE — 1159F MED LIST DOCD IN RCRD: CPT | Performed by: PSYCHIATRY & NEUROLOGY

## 2025-03-17 PROCEDURE — 1160F RVW MEDS BY RX/DR IN RCRD: CPT | Performed by: PSYCHIATRY & NEUROLOGY

## 2025-03-17 PROCEDURE — 99213 OFFICE O/P EST LOW 20 MIN: CPT | Performed by: PSYCHIATRY & NEUROLOGY

## 2025-03-17 PROCEDURE — G8419 CALC BMI OUT NRM PARAM NOF/U: HCPCS | Performed by: PSYCHIATRY & NEUROLOGY

## 2025-03-17 PROCEDURE — 1036F TOBACCO NON-USER: CPT | Performed by: PSYCHIATRY & NEUROLOGY

## 2025-03-17 PROCEDURE — 3075F SYST BP GE 130 - 139MM HG: CPT | Performed by: PSYCHIATRY & NEUROLOGY

## 2025-03-17 PROCEDURE — 1123F ACP DISCUSS/DSCN MKR DOCD: CPT | Performed by: PSYCHIATRY & NEUROLOGY

## 2025-03-17 ASSESSMENT — PATIENT HEALTH QUESTIONNAIRE - PHQ9
2. FEELING DOWN, DEPRESSED OR HOPELESS: NOT AT ALL
SUM OF ALL RESPONSES TO PHQ QUESTIONS 1-9: 0
1. LITTLE INTEREST OR PLEASURE IN DOING THINGS: NOT AT ALL
SUM OF ALL RESPONSES TO PHQ QUESTIONS 1-9: 0

## 2025-03-17 NOTE — PROGRESS NOTES
just on antiplatelet agents.  He has had a cardiac ablation that was successful and he is off Eliquis on Plavix now, and doing well.  His spells of speech arrest seem to have gotten better.  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  He is also running high blood pressure, in the past and oftentimes has accelerated hypertension during these episodes.  The patient never had any physical impairment as far as weakness, gait problems, sensory loss or other cranial nerve problems.  He has been admitted twice and his MRI scans just show mild atrophic changes and white matter disease consistent with his age.  We went over his MRI scan with him in detail showing him the white matter lesions.  He has never had a stroke.  His CTA of the head and neck was perfectly normal in November 2023.  His repeat CT of the head showed the enlarged cisterna magna, and his CTA of the head and neck did show a possible infundibulum of the left posterior cerebral artery versus tiny aneurysm.  Someone suggested he is having complicated migraines, somewhat also just as he is having seizures.  He does not seem to have recurrent A-fib and is on Plavix therapy.  There are some question whether he is having seizures and his wife would like him to get an EEG, so we set him up for 24-hour ambulatory EEG at last visit but he never got that..  The spells seem a little atypical for seizures to me though.  We will also draw a sed rate and an MIKKI to rule out vasculitis as a cause of his symptoms or temporal arteritis.  He is to call his cardiologist and schedule a follow-up because of his accelerated hypertension and recurrent spells and A-fib and inability to get a heart monitor.  He seems to be in regular rhythm most of the time during the examination today.  He does not appear to have any objective focal neurologic deficit.  Past Medical History:   Diagnosis Date    Atrial

## 2025-06-04 NOTE — PROGRESS NOTES
ASSESSMENT and PLAN  1.  PVCs (premature ventricular contractions)  19% PVC burden on monitor 12/2023 and 25% burden of one morphology on monitor 11/18/2024.  EF 65% on echo 12/9/2024.  Symptoms improved on the current dose of metoprolol.  Evaluated by Dr. Flores and no medication changes or additional testing recommended.    2. Bradycardia  Normal heart rates on monitor 11/2024.  Some low heart rates likely represent pseudobradycardia due to PVCs.    3. Paroxysmal atrial fibrillation (HCC)  Recurrent PAF discovered on monitor 11/18/2024.  Not on an OAC by patient choice and he reiterated his preference for daily aspirin in lieu of an OAC at today's visit.  I suggested a [employer] monitor or Apple Watch to monitor for recurrent atrial fibrillation.    4. Status post placement of implantable loop recorder  s/p loop recorder implantation at the time of PVI ablation 4/9/2018.  ILR end of service 12/19/2021.      Follow-up in 6 months. The patient has been instructed and agrees to call our office with any issues or other concerns related to their cardiac condition(s) and/or complaint(s).      CHIEF COMPLAINT  Follow-up PVCs and PAF    HPI:    Andre Harden is a 84 y.o. male here for follow-up of PVCs, PAT, PSVT and PAF.  A 72-hour monitor performed after the last visit on 11/13/2024 demonstrated 25% PVC burden of 1 morphology and 13 SVT/AT runs up to 3.5 minutes, some of which appeared irregular and likely atrial fibrillation.  Metoprolol succinate 25 mg daily was started.  An echo demonstrated EF 65% and mild MR.  I referred him back to Dr. Flores for reevaluation of the PVCs and recurrent PAF.  He saw Dr. Flores on 1/14/2025.  Since he was asymptomatic after starting the beta-blocker, no medication changes or additional testing was recommended.    He experiences brief palpitations infrequently.  He is not experienced sustained palpitations or heart racing.  He denies stroke/TIA symptoms.    PERTINENT CARDIAC

## 2025-06-09 RX ORDER — METOPROLOL SUCCINATE 25 MG/1
25 TABLET, EXTENDED RELEASE ORAL DAILY
Qty: 30 TABLET | Refills: 5 | Status: SHIPPED | OUTPATIENT
Start: 2025-06-09

## 2025-06-09 NOTE — TELEPHONE ENCOUNTER
PCP: Payal Goldsmith MD    Last appt: 11/13/2024   Future Appointments   Date Time Provider Department Center   6/17/2025  3:20 PM Sage Bishop MD [facility] AMB   3/17/2026 11:00 AM Vance Simental MD [id number]       Requested Prescriptions     Signed Prescriptions Disp Refills    metoprolol succinate (TOPROL XL) 25 MG extended release tablet 30 tablet 5     Sig: Take 1 tablet by mouth daily     Authorizing Provider: SAGE BISHOP     Ordering User: VALENTIN COULTER         Other Comments:  Verbal order per provider.  Order (medication, dose, route, frequency, amount, refills) repeated and verified twice.

## 2025-06-17 ENCOUNTER — OFFICE VISIT (OUTPATIENT)
Age: 85
End: 2025-06-17
Payer: MEDICARE

## 2025-06-17 VITALS
HEIGHT: 74 IN | DIASTOLIC BLOOD PRESSURE: 86 MMHG | SYSTOLIC BLOOD PRESSURE: 136 MMHG | BODY MASS INDEX: 24.9 KG/M2 | HEART RATE: 71 BPM | WEIGHT: 194 LBS | TEMPERATURE: 97.7 F | OXYGEN SATURATION: 98 %

## 2025-06-17 DIAGNOSIS — Z95.818 STATUS POST PLACEMENT OF IMPLANTABLE LOOP RECORDER: ICD-10-CM

## 2025-06-17 DIAGNOSIS — I49.3 PVCS (PREMATURE VENTRICULAR CONTRACTIONS): Primary | ICD-10-CM

## 2025-06-17 DIAGNOSIS — I48.0 PAROXYSMAL ATRIAL FIBRILLATION (HCC): ICD-10-CM

## 2025-06-17 DIAGNOSIS — R00.1 BRADYCARDIA: ICD-10-CM

## 2025-06-17 PROCEDURE — G8428 CUR MEDS NOT DOCUMENT: HCPCS | Performed by: INTERNAL MEDICINE

## 2025-06-17 PROCEDURE — 1123F ACP DISCUSS/DSCN MKR DOCD: CPT | Performed by: INTERNAL MEDICINE

## 2025-06-17 PROCEDURE — 1036F TOBACCO NON-USER: CPT | Performed by: INTERNAL MEDICINE

## 2025-06-17 PROCEDURE — G8420 CALC BMI NORM PARAMETERS: HCPCS | Performed by: INTERNAL MEDICINE

## 2025-06-17 PROCEDURE — 3075F SYST BP GE 130 - 139MM HG: CPT | Performed by: INTERNAL MEDICINE

## 2025-06-17 PROCEDURE — 99214 OFFICE O/P EST MOD 30 MIN: CPT | Performed by: INTERNAL MEDICINE

## 2025-06-17 PROCEDURE — 3079F DIAST BP 80-89 MM HG: CPT | Performed by: INTERNAL MEDICINE

## 2025-06-17 PROCEDURE — 1126F AMNT PAIN NOTED NONE PRSNT: CPT | Performed by: INTERNAL MEDICINE

## 2025-06-17 RX ORDER — VITC/E/ZINC/COPPER/LUTEIN/ZEAX 250 MG-200
TABLET,CHEWABLE ORAL
COMMUNITY

## 2025-06-17 ASSESSMENT — PATIENT HEALTH QUESTIONNAIRE - PHQ9
SUM OF ALL RESPONSES TO PHQ QUESTIONS 1-9: 0
SUM OF ALL RESPONSES TO PHQ QUESTIONS 1-9: 0
1. LITTLE INTEREST OR PLEASURE IN DOING THINGS: NOT AT ALL
SUM OF ALL RESPONSES TO PHQ QUESTIONS 1-9: 0
SUM OF ALL RESPONSES TO PHQ QUESTIONS 1-9: 0
2. FEELING DOWN, DEPRESSED OR HOPELESS: NOT AT ALL

## 2025-06-17 NOTE — PATIENT INSTRUCTIONS
Consider an Apple Watch or [employer] monitor to check for and/or monitor for recurrent atrial fibrillation.

## 2025-06-17 NOTE — PROGRESS NOTES
Identified pt with two pt identifiers(name and ). Reviewed record in preparation for visit and have obtained necessary documentation.  Chief Complaint   Patient presents with    Atrial Fibrillation    Other     PVC    Hypertension    Cholesterol Problem      /86 (BP Site: Left Upper Arm, Patient Position: Sitting, BP Cuff Size: Medium Adult)   Pulse 71   Temp 97.7 °F (36.5 °C) (Temporal)   Ht 1.88 m (6' 2\")   Wt 88 kg (194 lb)   SpO2 98%   BMI 24.91 kg/m²       Medications reviewed/approved by provider.      Health Maintenance Review: Patient reminded of \"due or due soon\" health maintenance. I have asked the patient to contact his/her primary care provider (PCP) for follow-up on his/her health maintenance.    Coordination of Care Questionnaire:  :   1) Have you been to an emergency room, urgent care, or hospitalized since your last visit?  If yes, where when, and reason for visit? no       2. Have seen or consulted any other health care provider since your last visit?   If yes, where when, and reason for visit?  no      Patient is accompanied by SELF I have received verbal consent from Andre Harden to discuss any/all medical information while they are present in the room.

## 2025-08-07 PROBLEM — I47.10 PSVT (PAROXYSMAL SUPRAVENTRICULAR TACHYCARDIA): Status: ACTIVE | Noted: 2025-08-07

## 2025-08-07 PROBLEM — R00.1 BRADYCARDIA: Status: ACTIVE | Noted: 2025-08-07

## 2025-08-07 PROBLEM — I10 PRIMARY HYPERTENSION: Status: ACTIVE | Noted: 2018-01-31

## 2025-08-12 ENCOUNTER — OFFICE VISIT (OUTPATIENT)
Age: 85
End: 2025-08-12
Payer: MEDICARE

## 2025-08-12 VITALS
WEIGHT: 195 LBS | OXYGEN SATURATION: 98 % | HEART RATE: 74 BPM | BODY MASS INDEX: 25.03 KG/M2 | DIASTOLIC BLOOD PRESSURE: 70 MMHG | SYSTOLIC BLOOD PRESSURE: 122 MMHG | HEIGHT: 74 IN | TEMPERATURE: 98.2 F

## 2025-08-12 DIAGNOSIS — I47.10 PSVT (PAROXYSMAL SUPRAVENTRICULAR TACHYCARDIA): ICD-10-CM

## 2025-08-12 DIAGNOSIS — I10 PRIMARY HYPERTENSION: ICD-10-CM

## 2025-08-12 DIAGNOSIS — Z95.818 STATUS POST PLACEMENT OF IMPLANTABLE LOOP RECORDER: ICD-10-CM

## 2025-08-12 DIAGNOSIS — I49.3 PVCS (PREMATURE VENTRICULAR CONTRACTIONS): Primary | ICD-10-CM

## 2025-08-12 DIAGNOSIS — I48.0 PAROXYSMAL ATRIAL FIBRILLATION (HCC): ICD-10-CM

## 2025-08-12 DIAGNOSIS — R00.1 BRADYCARDIA: ICD-10-CM

## 2025-08-12 PROCEDURE — 1159F MED LIST DOCD IN RCRD: CPT | Performed by: INTERNAL MEDICINE

## 2025-08-12 PROCEDURE — 3074F SYST BP LT 130 MM HG: CPT | Performed by: INTERNAL MEDICINE

## 2025-08-12 PROCEDURE — 99214 OFFICE O/P EST MOD 30 MIN: CPT | Performed by: INTERNAL MEDICINE

## 2025-08-12 PROCEDURE — 3078F DIAST BP <80 MM HG: CPT | Performed by: INTERNAL MEDICINE

## 2025-08-12 PROCEDURE — G8419 CALC BMI OUT NRM PARAM NOF/U: HCPCS | Performed by: INTERNAL MEDICINE

## 2025-08-12 PROCEDURE — G8427 DOCREV CUR MEDS BY ELIG CLIN: HCPCS | Performed by: INTERNAL MEDICINE

## 2025-08-12 PROCEDURE — 1036F TOBACCO NON-USER: CPT | Performed by: INTERNAL MEDICINE

## 2025-08-12 PROCEDURE — 1123F ACP DISCUSS/DSCN MKR DOCD: CPT | Performed by: INTERNAL MEDICINE

## 2025-08-12 PROCEDURE — 1126F AMNT PAIN NOTED NONE PRSNT: CPT | Performed by: INTERNAL MEDICINE

## 2025-08-12 RX ORDER — METOPROLOL TARTRATE 25 MG/1
25 TABLET, FILM COATED ORAL 2 TIMES DAILY
Qty: 180 TABLET | Refills: 3 | Status: SHIPPED | OUTPATIENT
Start: 2025-08-12

## 2025-08-12 ASSESSMENT — PATIENT HEALTH QUESTIONNAIRE - PHQ9
SUM OF ALL RESPONSES TO PHQ QUESTIONS 1-9: 0
1. LITTLE INTEREST OR PLEASURE IN DOING THINGS: NOT AT ALL
2. FEELING DOWN, DEPRESSED OR HOPELESS: NOT AT ALL